# Patient Record
Sex: MALE | Race: WHITE
[De-identification: names, ages, dates, MRNs, and addresses within clinical notes are randomized per-mention and may not be internally consistent; named-entity substitution may affect disease eponyms.]

---

## 2017-01-09 ENCOUNTER — APPOINTMENT (OUTPATIENT)
Dept: PODIATRY | Facility: CLINIC | Age: 57
End: 2017-01-09

## 2017-01-12 ENCOUNTER — APPOINTMENT (OUTPATIENT)
Dept: ENDOCRINOLOGY | Facility: CLINIC | Age: 57
End: 2017-01-12

## 2017-01-12 VITALS — SYSTOLIC BLOOD PRESSURE: 110 MMHG | DIASTOLIC BLOOD PRESSURE: 70 MMHG | HEART RATE: 81 BPM

## 2017-03-13 ENCOUNTER — APPOINTMENT (OUTPATIENT)
Dept: PODIATRY | Facility: CLINIC | Age: 57
End: 2017-03-13

## 2017-03-13 VITALS — HEIGHT: 71 IN | WEIGHT: 285 LBS | BODY MASS INDEX: 39.9 KG/M2

## 2017-05-09 ENCOUNTER — APPOINTMENT (OUTPATIENT)
Dept: PODIATRY | Facility: CLINIC | Age: 57
End: 2017-05-09

## 2017-05-09 VITALS
HEIGHT: 71 IN | SYSTOLIC BLOOD PRESSURE: 140 MMHG | WEIGHT: 285 LBS | DIASTOLIC BLOOD PRESSURE: 72 MMHG | BODY MASS INDEX: 39.9 KG/M2 | HEART RATE: 68 BPM

## 2017-05-09 DIAGNOSIS — L85.3 XEROSIS CUTIS: ICD-10-CM

## 2017-05-23 ENCOUNTER — RESULT REVIEW (OUTPATIENT)
Age: 57
End: 2017-05-23

## 2017-05-23 ENCOUNTER — APPOINTMENT (OUTPATIENT)
Dept: NEPHROLOGY | Facility: CLINIC | Age: 57
End: 2017-05-23

## 2017-05-23 ENCOUNTER — OUTPATIENT (OUTPATIENT)
Dept: OUTPATIENT SERVICES | Facility: HOSPITAL | Age: 57
LOS: 1 days | Discharge: HOME | End: 2017-05-23

## 2017-05-23 VITALS
SYSTOLIC BLOOD PRESSURE: 135 MMHG | WEIGHT: 300 LBS | HEIGHT: 71 IN | BODY MASS INDEX: 42 KG/M2 | HEART RATE: 98 BPM | DIASTOLIC BLOOD PRESSURE: 77 MMHG

## 2017-06-28 DIAGNOSIS — N18.9 CHRONIC KIDNEY DISEASE, UNSPECIFIED: ICD-10-CM

## 2017-07-05 ENCOUNTER — APPOINTMENT (OUTPATIENT)
Dept: PODIATRY | Facility: CLINIC | Age: 57
End: 2017-07-05

## 2017-07-05 ENCOUNTER — OUTPATIENT (OUTPATIENT)
Dept: OUTPATIENT SERVICES | Facility: HOSPITAL | Age: 57
LOS: 1 days | Discharge: HOME | End: 2017-07-05

## 2017-07-05 VITALS — BODY MASS INDEX: 42.09 KG/M2 | WEIGHT: 294 LBS | HEIGHT: 70 IN

## 2017-07-11 LAB
25(OH)D3 SERPL-MCNC: 50 NG/ML
ALBUMIN SERPL-MCNC: 4.2 G/DL
ALBUMIN/GLOB SERPL: 1.68
ALP SERPL-CCNC: 79 IU/L
ALT SERPL-CCNC: 15 IU/L
ANION GAP SERPL CALC-SCNC: 9 MEQ/L
APPEARANCE UR: CLEAR
AST SERPL-CCNC: 15 IU/L
BACTERIA URNS QL MICRO: ABNORMAL
BASOPHILS # BLD: 0.03 TH/MM3
BASOPHILS NFR BLD: 0.3 %
BILIRUB SERPL-MCNC: 1 MG/DL
BILIRUB UR QL STRIP: NEGATIVE
BUN SERPL-MCNC: 20 MG/DL
BUN/CREAT SERPL: 13.1 %
CALCIUM SERPL-MCNC: 9.9 MG/DL
CHLORIDE SERPL-SCNC: 102 MEQ/L
CO2 SERPL-SCNC: 27 MEQ/L
COLOR UR: YELLOW
CREAT SERPL-MCNC: 1.53 MG/DL
CREAT UR-MCNC: 171.5 MG/DL
DIFFERENTIAL METHOD BLD: NORMAL
EOSINOPHIL # BLD: 0.1 TH/MM3
EOSINOPHIL NFR BLD: 1.1 %
ERYTHROCYTE [DISTWIDTH] IN BLOOD BY AUTOMATED COUNT: 13.8 %
ESTIMATED AVERGAGE GLUCOSE (NORTH): 346 MG/DL
GFR SERPL CREATININE-BSD FRML MDRD: 47
GLUCOSE SERPL-MCNC: 275 MG/DL
GLUCOSE UR STRIP-MCNC: >=1000 MG/DL
GRANULOCYTES # BLD: 6.9 TH/MM3
GRANULOCYTES NFR BLD: 78.1 %
HBA1C MFR BLD: 13.7 %
HCT VFR BLD AUTO: 43.6 %
HGB BLD-MCNC: 14.1 G/DL
HGB UR QL STRIP: ABNORMAL
IMM GRANULOCYTES # BLD: 0.02 TH/MM3
IMM GRANULOCYTES NFR BLD: 0.2 %
KETONES UR STRIP-MCNC: NEGATIVE MG/DL
LYMPHOCYTES # BLD: 1.23 TH/MM3
LYMPHOCYTES NFR BLD: 13.9 %
MCH RBC QN AUTO: 28.3 PG
MCHC RBC AUTO-ENTMCNC: 32.3 G/DL
MCV RBC AUTO: 87.4 FL
MONOCYTES # BLD: 0.57 TH/MM3
MONOCYTES NFR BLD: 6.4 %
NITRITE UR QL STRIP: NEGATIVE
PH UR STRIP: 6
PHOSPHATE SERPL-MCNC: 3.7 MG/DL
PLATELET # BLD: 340 TH/MM3
PMV BLD AUTO: 10 FL
POTASSIUM SERPL-SCNC: 4.8 MMOL/L
PROT SERPL-MCNC: 6.7 G/DL
PROT UR STRIP-MCNC: NEGATIVE MG/DL
PROT UR-MCNC: 19 MG/DL
PROT/CREAT UR: 110.77 MG/G CRE
RBC # BLD AUTO: 4.99 MIL/MM3
RBC #/AREA URNS HPF: ABNORMAL P/HPF
SODIUM SERPL-SCNC: 138 MEQ/L
SP GR UR STRIP: 1.02
URINE COMP/EPITH (NORTH): ABNORMAL
UROBILINOGEN UR STRIP-MCNC: 0.2 MG/DL
VITAMIN D2 SERPL-MCNC: <4 NG/ML
VITAMIN D3 SERPL-MCNC: 50 NG/ML
WBC # BLD: 8.85 TH/MM3
WBC URNS QL MICRO: ABNORMAL
WBC URNS QL MICRO: ABNORMAL P/HPF

## 2017-07-13 ENCOUNTER — APPOINTMENT (OUTPATIENT)
Dept: ENDOCRINOLOGY | Facility: CLINIC | Age: 57
End: 2017-07-13

## 2017-07-13 ENCOUNTER — OUTPATIENT (OUTPATIENT)
Dept: OUTPATIENT SERVICES | Facility: HOSPITAL | Age: 57
LOS: 1 days | Discharge: HOME | End: 2017-07-13

## 2017-07-13 VITALS
SYSTOLIC BLOOD PRESSURE: 117 MMHG | BODY MASS INDEX: 42 KG/M2 | WEIGHT: 300 LBS | HEIGHT: 71 IN | HEART RATE: 79 BPM | DIASTOLIC BLOOD PRESSURE: 69 MMHG

## 2017-07-13 RX ORDER — UBIDECARENONE/VIT E ACET 100MG-5
50 MCG CAPSULE ORAL DAILY
Refills: 0 | Status: ACTIVE | COMMUNITY
Start: 2017-07-13

## 2017-07-14 RX ORDER — INSULIN ADMIN. SUPPLIES
30G X 8 MM INSULIN PEN (EA) SUBCUTANEOUS
Qty: 200 | Refills: 3 | Status: COMPLETED | COMMUNITY
Start: 2017-05-25 | End: 2017-07-14

## 2017-07-21 DIAGNOSIS — E78.00 PURE HYPERCHOLESTEROLEMIA, UNSPECIFIED: ICD-10-CM

## 2017-07-21 DIAGNOSIS — E11.65 TYPE 2 DIABETES MELLITUS WITH HYPERGLYCEMIA: ICD-10-CM

## 2017-07-21 DIAGNOSIS — E06.3 AUTOIMMUNE THYROIDITIS: ICD-10-CM

## 2017-09-06 ENCOUNTER — OUTPATIENT (OUTPATIENT)
Dept: OUTPATIENT SERVICES | Facility: HOSPITAL | Age: 57
LOS: 1 days | Discharge: HOME | End: 2017-09-06

## 2017-09-06 ENCOUNTER — APPOINTMENT (OUTPATIENT)
Dept: PODIATRY | Facility: CLINIC | Age: 57
End: 2017-09-06

## 2017-09-06 VITALS
BODY MASS INDEX: 40.46 KG/M2 | TEMPERATURE: 96.3 F | SYSTOLIC BLOOD PRESSURE: 108 MMHG | HEART RATE: 80 BPM | WEIGHT: 289 LBS | DIASTOLIC BLOOD PRESSURE: 74 MMHG | HEIGHT: 71 IN

## 2017-09-06 DIAGNOSIS — B35.1 TINEA UNGUIUM: ICD-10-CM

## 2017-09-22 ENCOUNTER — OUTPATIENT (OUTPATIENT)
Dept: OUTPATIENT SERVICES | Facility: HOSPITAL | Age: 57
LOS: 1 days | Discharge: HOME | End: 2017-09-22

## 2017-09-26 DIAGNOSIS — H43.399 OTHER VITREOUS OPACITIES, UNSPECIFIED EYE: ICD-10-CM

## 2017-09-26 DIAGNOSIS — E11.9 TYPE 2 DIABETES MELLITUS WITHOUT COMPLICATIONS: ICD-10-CM

## 2017-09-26 DIAGNOSIS — I10 ESSENTIAL (PRIMARY) HYPERTENSION: ICD-10-CM

## 2017-09-26 DIAGNOSIS — H25.813 COMBINED FORMS OF AGE-RELATED CATARACT, BILATERAL: ICD-10-CM

## 2017-10-11 ENCOUNTER — OUTPATIENT (OUTPATIENT)
Dept: OUTPATIENT SERVICES | Facility: HOSPITAL | Age: 57
LOS: 1 days | Discharge: HOME | End: 2017-10-11

## 2017-10-11 ENCOUNTER — APPOINTMENT (OUTPATIENT)
Dept: ENDOCRINOLOGY | Facility: CLINIC | Age: 57
End: 2017-10-11

## 2017-10-11 VITALS — DIASTOLIC BLOOD PRESSURE: 67 MMHG | HEART RATE: 80 BPM | SYSTOLIC BLOOD PRESSURE: 94 MMHG

## 2017-10-11 VITALS — HEIGHT: 71 IN | WEIGHT: 285 LBS | BODY MASS INDEX: 39.9 KG/M2

## 2017-10-11 DIAGNOSIS — Z86.39 PERSONAL HISTORY OF OTHER ENDOCRINE, NUTRITIONAL AND METABOLIC DISEASE: ICD-10-CM

## 2017-10-11 DIAGNOSIS — E11.65 TYPE 2 DIABETES MELLITUS WITH HYPERGLYCEMIA: ICD-10-CM

## 2017-10-11 DIAGNOSIS — Z87.448 PERSONAL HISTORY OF OTHER DISEASES OF URINARY SYSTEM: ICD-10-CM

## 2017-10-11 DIAGNOSIS — E78.00 PURE HYPERCHOLESTEROLEMIA, UNSPECIFIED: ICD-10-CM

## 2017-10-11 DIAGNOSIS — E06.3 AUTOIMMUNE THYROIDITIS: ICD-10-CM

## 2017-10-16 DIAGNOSIS — E66.01 MORBID (SEVERE) OBESITY DUE TO EXCESS CALORIES: ICD-10-CM

## 2017-10-16 DIAGNOSIS — E11.22 TYPE 2 DIABETES MELLITUS WITH DIABETIC CHRONIC KIDNEY DISEASE: ICD-10-CM

## 2017-10-16 DIAGNOSIS — E11.65 TYPE 2 DIABETES MELLITUS WITH HYPERGLYCEMIA: ICD-10-CM

## 2017-10-17 ENCOUNTER — OUTPATIENT (OUTPATIENT)
Dept: OUTPATIENT SERVICES | Facility: HOSPITAL | Age: 57
LOS: 1 days | Discharge: HOME | End: 2017-10-17

## 2017-11-07 ENCOUNTER — APPOINTMENT (OUTPATIENT)
Dept: NEPHROLOGY | Facility: CLINIC | Age: 57
End: 2017-11-07

## 2017-11-07 ENCOUNTER — RESULT REVIEW (OUTPATIENT)
Age: 57
End: 2017-11-07

## 2017-11-07 ENCOUNTER — OUTPATIENT (OUTPATIENT)
Dept: OUTPATIENT SERVICES | Facility: HOSPITAL | Age: 57
LOS: 1 days | Discharge: HOME | End: 2017-11-07

## 2017-11-07 VITALS
BODY MASS INDEX: 39.76 KG/M2 | WEIGHT: 284 LBS | SYSTOLIC BLOOD PRESSURE: 102 MMHG | DIASTOLIC BLOOD PRESSURE: 67 MMHG | HEIGHT: 71 IN | HEART RATE: 80 BPM

## 2017-11-07 DIAGNOSIS — E11.65 TYPE 2 DIABETES MELLITUS WITH HYPERGLYCEMIA: ICD-10-CM

## 2017-11-07 RX ORDER — ATORVASTATIN CALCIUM 40 MG/1
40 TABLET, FILM COATED ORAL DAILY
Qty: 30 | Refills: 5 | Status: DISCONTINUED | COMMUNITY
Start: 2017-07-13 | End: 2017-11-07

## 2017-11-08 ENCOUNTER — OUTPATIENT (OUTPATIENT)
Dept: OUTPATIENT SERVICES | Facility: HOSPITAL | Age: 57
LOS: 1 days | Discharge: HOME | End: 2017-11-08

## 2017-11-08 ENCOUNTER — APPOINTMENT (OUTPATIENT)
Dept: PODIATRY | Facility: CLINIC | Age: 57
End: 2017-11-08

## 2017-11-08 VITALS
WEIGHT: 284 LBS | DIASTOLIC BLOOD PRESSURE: 70 MMHG | SYSTOLIC BLOOD PRESSURE: 105 MMHG | HEIGHT: 71 IN | HEART RATE: 85 BPM | BODY MASS INDEX: 39.76 KG/M2

## 2018-01-03 ENCOUNTER — OUTPATIENT (OUTPATIENT)
Dept: OUTPATIENT SERVICES | Facility: HOSPITAL | Age: 58
LOS: 1 days | Discharge: HOME | End: 2018-01-03

## 2018-01-03 ENCOUNTER — APPOINTMENT (OUTPATIENT)
Dept: PODIATRY | Facility: CLINIC | Age: 58
End: 2018-01-03

## 2018-01-03 VITALS
HEART RATE: 163 BPM | WEIGHT: 280 LBS | SYSTOLIC BLOOD PRESSURE: 92 MMHG | HEIGHT: 70 IN | DIASTOLIC BLOOD PRESSURE: 66 MMHG | BODY MASS INDEX: 40.09 KG/M2

## 2018-01-03 RX ORDER — NAFTIFINE HYDROCHLORIDE 10 MG/G
1 GEL TOPICAL TWICE DAILY
Qty: 60 | Refills: 1 | Status: ACTIVE | COMMUNITY
Start: 2018-01-03 | End: 1900-01-01

## 2018-02-13 LAB
ANION GAP SERPL CALC-SCNC: 11 MEQ/L
APPEARANCE UR: CLEAR
BACTERIA URNS QL MICRO: ABNORMAL
BASOPHILS # BLD: 0.03 TH/MM3
BASOPHILS NFR BLD: 0.4 %
BILIRUB UR QL STRIP: NEGATIVE
BUN SERPL-MCNC: 35 MG/DL
BUN/CREAT SERPL: 19.8 %
CALCIUM SERPL-MCNC: 10.1 MG/DL
CHLORIDE SERPL-SCNC: 100 MEQ/L
CO2 SERPL-SCNC: 25 MEQ/L
COLOR UR: YELLOW
CREAT SERPL-MCNC: 1.77 MG/DL
DIFFERENTIAL METHOD BLD: NORMAL
EOSINOPHIL # BLD: 0.12 TH/MM3
EOSINOPHIL NFR BLD: 1.6 %
ERYTHROCYTE [DISTWIDTH] IN BLOOD BY AUTOMATED COUNT: 14.4 %
GFR SERPL CREATININE-BSD FRML MDRD: 40
GLUCOSE SERPL-MCNC: 294 MG/DL
GLUCOSE UR STRIP-MCNC: >=1000 MG/DL
GRANULOCYTES # BLD: 5.28 TH/MM3
GRANULOCYTES NFR BLD: 71.2 %
HCT VFR BLD AUTO: 45 %
HGB BLD-MCNC: 14.9 G/DL
HGB UR QL STRIP: NEGATIVE
IMM GRANULOCYTES # BLD: 0.01 TH/MM3
IMM GRANULOCYTES NFR BLD: 0.1 %
KETONES UR STRIP-MCNC: NEGATIVE MG/DL
LYMPHOCYTES # BLD: 1.32 TH/MM3
LYMPHOCYTES NFR BLD: 17.8 %
MCH RBC QN AUTO: 28.1 PG
MCHC RBC AUTO-ENTMCNC: 33.1 G/DL
MCV RBC AUTO: 84.9 FL
MONOCYTES # BLD: 0.66 TH/MM3
MONOCYTES NFR BLD: 8.9 %
NITRITE UR QL STRIP: NEGATIVE
PH UR STRIP: 6
PLATELET # BLD: 360 TH/MM3
PMV BLD AUTO: 9.7 FL
POTASSIUM SERPL-SCNC: 4.8 MMOL/L
PROT UR STRIP-MCNC: NEGATIVE MG/DL
RBC # BLD AUTO: 5.3 MIL/MM3
SODIUM SERPL-SCNC: 136 MEQ/L
SP GR UR STRIP: 1.02
UROBILINOGEN UR STRIP-MCNC: 0.2 MG/DL
WBC # BLD: 7.42 TH/MM3
WBC URNS QL MICRO: ABNORMAL
WBC URNS QL MICRO: ABNORMAL P/HPF

## 2018-03-07 ENCOUNTER — LABORATORY RESULT (OUTPATIENT)
Age: 58
End: 2018-03-07

## 2018-03-07 ENCOUNTER — OUTPATIENT (OUTPATIENT)
Dept: OUTPATIENT SERVICES | Facility: HOSPITAL | Age: 58
LOS: 1 days | Discharge: HOME | End: 2018-03-07

## 2018-03-07 ENCOUNTER — APPOINTMENT (OUTPATIENT)
Dept: PODIATRY | Facility: CLINIC | Age: 58
End: 2018-03-07
Payer: MEDICAID

## 2018-03-07 VITALS
HEIGHT: 70 IN | RESPIRATION RATE: 20 BRPM | DIASTOLIC BLOOD PRESSURE: 60 MMHG | SYSTOLIC BLOOD PRESSURE: 94 MMHG | WEIGHT: 28 LBS | BODY MASS INDEX: 4.01 KG/M2 | HEART RATE: 94 BPM

## 2018-03-07 DIAGNOSIS — E78.5 HYPERLIPIDEMIA, UNSPECIFIED: ICD-10-CM

## 2018-03-07 DIAGNOSIS — E11.65 TYPE 2 DIABETES MELLITUS WITH HYPERGLYCEMIA: ICD-10-CM

## 2018-03-07 PROCEDURE — 11721 DEBRIDE NAIL 6 OR MORE: CPT

## 2018-04-12 ENCOUNTER — APPOINTMENT (OUTPATIENT)
Dept: ENDOCRINOLOGY | Facility: CLINIC | Age: 58
End: 2018-04-12

## 2018-04-12 ENCOUNTER — OUTPATIENT (OUTPATIENT)
Dept: OUTPATIENT SERVICES | Facility: HOSPITAL | Age: 58
LOS: 1 days | Discharge: HOME | End: 2018-04-12

## 2018-04-12 VITALS
SYSTOLIC BLOOD PRESSURE: 102 MMHG | BODY MASS INDEX: 41.52 KG/M2 | WEIGHT: 290 LBS | HEIGHT: 70 IN | DIASTOLIC BLOOD PRESSURE: 71 MMHG | HEART RATE: 97 BPM

## 2018-04-17 ENCOUNTER — OUTPATIENT (OUTPATIENT)
Dept: OUTPATIENT SERVICES | Facility: HOSPITAL | Age: 58
LOS: 1 days | Discharge: HOME | End: 2018-04-17

## 2018-04-18 ENCOUNTER — LABORATORY RESULT (OUTPATIENT)
Age: 58
End: 2018-04-18

## 2018-04-18 DIAGNOSIS — E11.9 TYPE 2 DIABETES MELLITUS WITHOUT COMPLICATIONS: ICD-10-CM

## 2018-04-18 DIAGNOSIS — H43.393 OTHER VITREOUS OPACITIES, BILATERAL: ICD-10-CM

## 2018-04-18 DIAGNOSIS — H26.9 UNSPECIFIED CATARACT: ICD-10-CM

## 2018-04-18 LAB
ANION GAP SERPL CALC-SCNC: 18 MMOL/L
BUN SERPL-MCNC: 30 MG/DL
CALCIUM SERPL-MCNC: 9.9 MG/DL
CHLORIDE SERPL-SCNC: 99 MMOL/L
CHOLEST SERPL-MCNC: 240 MG/DL
CHOLEST/HDLC SERPL: 6.7 RATIO
CO2 SERPL-SCNC: 26 MMOL/L
CREAT SERPL-MCNC: 1.8 MG/DL
GLUCOSE SERPL-MCNC: 160 MG/DL
HDLC SERPL-MCNC: 36 MG/DL
LDLC SERPL CALC-MCNC: 137 MG/DL
POTASSIUM SERPL-SCNC: 4.7 MMOL/L
SODIUM SERPL-SCNC: 143 MMOL/L
TRIGL SERPL-MCNC: 360 MG/DL

## 2018-04-18 RX ORDER — SIMVASTATIN 20 MG/1
20 TABLET, FILM COATED ORAL DAILY
Qty: 30 | Refills: 0 | Status: DISCONTINUED | COMMUNITY
Start: 2017-11-07 | End: 2018-04-18

## 2018-04-19 LAB — PSA SERPL-MCNC: 0.61 NG/ML

## 2018-05-03 ENCOUNTER — RX RENEWAL (OUTPATIENT)
Age: 58
End: 2018-05-03

## 2018-05-08 ENCOUNTER — OUTPATIENT (OUTPATIENT)
Dept: OUTPATIENT SERVICES | Facility: HOSPITAL | Age: 58
LOS: 1 days | Discharge: HOME | End: 2018-05-08

## 2018-05-08 ENCOUNTER — APPOINTMENT (OUTPATIENT)
Dept: NEPHROLOGY | Facility: CLINIC | Age: 58
End: 2018-05-08

## 2018-05-08 VITALS
HEART RATE: 92 BPM | DIASTOLIC BLOOD PRESSURE: 42 MMHG | WEIGHT: 288 LBS | SYSTOLIC BLOOD PRESSURE: 90 MMHG | BODY MASS INDEX: 41.23 KG/M2 | HEIGHT: 70 IN

## 2018-05-14 DIAGNOSIS — E11.8 TYPE 2 DIABETES MELLITUS WITH UNSPECIFIED COMPLICATIONS: ICD-10-CM

## 2018-05-14 DIAGNOSIS — E11.40 TYPE 2 DIABETES MELLITUS WITH DIABETIC NEUROPATHY, UNSPECIFIED: ICD-10-CM

## 2018-05-14 DIAGNOSIS — E11.22 TYPE 2 DIABETES MELLITUS WITH DIABETIC CHRONIC KIDNEY DISEASE: ICD-10-CM

## 2018-05-30 ENCOUNTER — OUTPATIENT (OUTPATIENT)
Dept: OUTPATIENT SERVICES | Facility: HOSPITAL | Age: 58
LOS: 1 days | Discharge: HOME | End: 2018-05-30

## 2018-06-01 DIAGNOSIS — H25.813 COMBINED FORMS OF AGE-RELATED CATARACT, BILATERAL: ICD-10-CM

## 2018-06-01 DIAGNOSIS — H35.033 HYPERTENSIVE RETINOPATHY, BILATERAL: ICD-10-CM

## 2018-06-01 DIAGNOSIS — E11.9 TYPE 2 DIABETES MELLITUS WITHOUT COMPLICATIONS: ICD-10-CM

## 2018-06-12 ENCOUNTER — OUTPATIENT (OUTPATIENT)
Dept: OUTPATIENT SERVICES | Facility: HOSPITAL | Age: 58
LOS: 1 days | Discharge: HOME | End: 2018-06-12

## 2018-06-12 ENCOUNTER — APPOINTMENT (OUTPATIENT)
Dept: PODIATRY | Facility: CLINIC | Age: 58
End: 2018-06-12
Payer: MEDICAID

## 2018-06-12 VITALS
HEIGHT: 70 IN | WEIGHT: 290 LBS | DIASTOLIC BLOOD PRESSURE: 65 MMHG | BODY MASS INDEX: 41.52 KG/M2 | SYSTOLIC BLOOD PRESSURE: 95 MMHG | HEART RATE: 78 BPM

## 2018-06-12 DIAGNOSIS — E11.42 TYPE 2 DIABETES MELLITUS WITH DIABETIC POLYNEUROPATHY: ICD-10-CM

## 2018-06-12 DIAGNOSIS — M79.672 PAIN IN LEFT FOOT: ICD-10-CM

## 2018-06-12 DIAGNOSIS — M79.671 PAIN IN RIGHT FOOT: ICD-10-CM

## 2018-06-12 DIAGNOSIS — B35.1 TINEA UNGUIUM: ICD-10-CM

## 2018-06-12 PROCEDURE — 11721 DEBRIDE NAIL 6 OR MORE: CPT

## 2018-06-12 PROCEDURE — 99212 OFFICE O/P EST SF 10 MIN: CPT | Mod: 25

## 2018-09-11 ENCOUNTER — OUTPATIENT (OUTPATIENT)
Dept: OUTPATIENT SERVICES | Facility: HOSPITAL | Age: 58
LOS: 1 days | Discharge: HOME | End: 2018-09-11

## 2018-09-11 ENCOUNTER — APPOINTMENT (OUTPATIENT)
Dept: PODIATRY | Facility: CLINIC | Age: 58
End: 2018-09-11
Payer: MEDICAID

## 2018-09-11 VITALS
WEIGHT: 291 LBS | DIASTOLIC BLOOD PRESSURE: 69 MMHG | SYSTOLIC BLOOD PRESSURE: 97 MMHG | HEIGHT: 70 IN | HEART RATE: 89 BPM | BODY MASS INDEX: 41.66 KG/M2

## 2018-09-11 PROCEDURE — 11721 DEBRIDE NAIL 6 OR MORE: CPT

## 2018-09-11 PROCEDURE — 99212 OFFICE O/P EST SF 10 MIN: CPT | Mod: 25

## 2018-09-11 RX ORDER — CLOTRIMAZOLE 10 MG/G
1 CREAM TOPICAL
Qty: 1 | Refills: 5 | Status: ACTIVE | COMMUNITY
Start: 2018-09-11 | End: 1900-01-01

## 2018-09-24 DIAGNOSIS — M79.672 PAIN IN LEFT FOOT: ICD-10-CM

## 2018-09-24 DIAGNOSIS — M79.671 PAIN IN RIGHT FOOT: ICD-10-CM

## 2018-09-24 DIAGNOSIS — B35.1 TINEA UNGUIUM: ICD-10-CM

## 2018-10-11 ENCOUNTER — APPOINTMENT (OUTPATIENT)
Dept: ENDOCRINOLOGY | Facility: CLINIC | Age: 58
End: 2018-10-11

## 2018-11-01 ENCOUNTER — RX RENEWAL (OUTPATIENT)
Age: 58
End: 2018-11-01

## 2018-11-01 RX ORDER — LIRAGLUTIDE 6 MG/ML
18 INJECTION SUBCUTANEOUS DAILY
Qty: 1 | Refills: 5 | Status: DISCONTINUED | COMMUNITY
Start: 2017-07-13 | End: 2018-11-01

## 2018-11-01 RX ORDER — INSULIN ADMIN. SUPPLIES
30G X 8 MM INSULIN PEN (EA) SUBCUTANEOUS
Qty: 1 | Refills: 5 | Status: DISCONTINUED | COMMUNITY
Start: 2017-07-13 | End: 2018-11-01

## 2018-11-19 ENCOUNTER — APPOINTMENT (OUTPATIENT)
Dept: PODIATRY | Facility: CLINIC | Age: 58
End: 2018-11-19
Payer: MEDICAID

## 2018-11-19 ENCOUNTER — OUTPATIENT (OUTPATIENT)
Dept: OUTPATIENT SERVICES | Facility: HOSPITAL | Age: 58
LOS: 1 days | Discharge: HOME | End: 2018-11-19

## 2018-11-19 VITALS
DIASTOLIC BLOOD PRESSURE: 70 MMHG | SYSTOLIC BLOOD PRESSURE: 103 MMHG | HEART RATE: 99 BPM | HEIGHT: 70 IN | BODY MASS INDEX: 40.23 KG/M2 | WEIGHT: 281 LBS

## 2018-11-19 PROCEDURE — 11721 DEBRIDE NAIL 6 OR MORE: CPT

## 2018-11-21 DIAGNOSIS — M79.671 PAIN IN RIGHT FOOT: ICD-10-CM

## 2018-11-21 DIAGNOSIS — M79.672 PAIN IN LEFT FOOT: ICD-10-CM

## 2018-11-21 DIAGNOSIS — B35.1 TINEA UNGUIUM: ICD-10-CM

## 2018-12-05 ENCOUNTER — OUTPATIENT (OUTPATIENT)
Dept: OUTPATIENT SERVICES | Facility: HOSPITAL | Age: 58
LOS: 1 days | Discharge: HOME | End: 2018-12-05

## 2018-12-05 ENCOUNTER — APPOINTMENT (OUTPATIENT)
Dept: ENDOCRINOLOGY | Facility: CLINIC | Age: 58
End: 2018-12-05

## 2018-12-05 VITALS
SYSTOLIC BLOOD PRESSURE: 97 MMHG | WEIGHT: 284 LBS | HEIGHT: 70 IN | BODY MASS INDEX: 40.66 KG/M2 | HEART RATE: 98 BPM | DIASTOLIC BLOOD PRESSURE: 66 MMHG

## 2018-12-05 RX ORDER — SEMAGLUTIDE 1.34 MG/ML
2 INJECTION, SOLUTION SUBCUTANEOUS
Qty: 1 | Refills: 0 | Status: DISCONTINUED | COMMUNITY
Start: 2018-11-01 | End: 2018-12-05

## 2018-12-06 LAB
ALBUMIN SERPL ELPH-MCNC: 4.1 G/DL
ALP BLD-CCNC: 67 U/L
ALT SERPL-CCNC: 12 U/L
ANION GAP SERPL CALC-SCNC: 14 MMOL/L
AST SERPL-CCNC: 14 U/L
BASOPHILS # BLD AUTO: 0.05 K/UL
BASOPHILS NFR BLD AUTO: 0.6 %
BILIRUB SERPL-MCNC: 0.5 MG/DL
BUN SERPL-MCNC: 29 MG/DL
CALCIUM SERPL-MCNC: 10.2 MG/DL
CHLORIDE SERPL-SCNC: 103 MMOL/L
CHOLEST SERPL-MCNC: 112 MG/DL
CHOLEST/HDLC SERPL: 3.7 RATIO
CO2 SERPL-SCNC: 25 MMOL/L
CREAT SERPL-MCNC: 1.6 MG/DL
EOSINOPHIL # BLD AUTO: 0.09 K/UL
EOSINOPHIL NFR BLD AUTO: 1.1 %
ESTIMATED AVERAGE GLUCOSE: 197 MG/DL
GLUCOSE SERPL-MCNC: 275 MG/DL
HBA1C MFR BLD HPLC: 8.5 %
HCT VFR BLD CALC: 44.5 %
HDLC SERPL-MCNC: 30 MG/DL
HGB BLD-MCNC: 14.4 G/DL
IMM GRANULOCYTES NFR BLD AUTO: 0.4 %
LDLC SERPL CALC-MCNC: 56 MG/DL
LYMPHOCYTES # BLD AUTO: 1.3 K/UL
LYMPHOCYTES NFR BLD AUTO: 16.1 %
MAN DIFF?: NORMAL
MCHC RBC-ENTMCNC: 28.2 PG
MCHC RBC-ENTMCNC: 32.4 G/DL
MCV RBC AUTO: 87.1 FL
MONOCYTES # BLD AUTO: 0.55 K/UL
MONOCYTES NFR BLD AUTO: 6.8 %
NEUTROPHILS # BLD AUTO: 6.05 K/UL
NEUTROPHILS NFR BLD AUTO: 75 %
PLATELET # BLD AUTO: 323 K/UL
POTASSIUM SERPL-SCNC: 5.2 MMOL/L
PROT SERPL-MCNC: 6.5 G/DL
PSA SERPL-MCNC: 0.49 NG/ML
RBC # BLD: 5.11 M/UL
RBC # FLD: 13.2 %
SODIUM SERPL-SCNC: 142 MMOL/L
TRIGL SERPL-MCNC: 221 MG/DL
TSH SERPL-ACNC: 1.16 UIU/ML
WBC # FLD AUTO: 8.07 K/UL

## 2018-12-12 DIAGNOSIS — E66.01 MORBID (SEVERE) OBESITY DUE TO EXCESS CALORIES: ICD-10-CM

## 2018-12-12 DIAGNOSIS — E11.65 TYPE 2 DIABETES MELLITUS WITH HYPERGLYCEMIA: ICD-10-CM

## 2018-12-12 DIAGNOSIS — E78.00 PURE HYPERCHOLESTEROLEMIA, UNSPECIFIED: ICD-10-CM

## 2018-12-12 DIAGNOSIS — E11.21 TYPE 2 DIABETES MELLITUS WITH DIABETIC NEPHROPATHY: ICD-10-CM

## 2019-02-11 ENCOUNTER — APPOINTMENT (OUTPATIENT)
Dept: PODIATRY | Facility: CLINIC | Age: 59
End: 2019-02-11
Payer: MEDICAID

## 2019-02-11 ENCOUNTER — OUTPATIENT (OUTPATIENT)
Dept: OUTPATIENT SERVICES | Facility: HOSPITAL | Age: 59
LOS: 1 days | Discharge: HOME | End: 2019-02-11

## 2019-02-11 VITALS
HEART RATE: 90 BPM | BODY MASS INDEX: 40.09 KG/M2 | WEIGHT: 280 LBS | HEIGHT: 70 IN | SYSTOLIC BLOOD PRESSURE: 91 MMHG | DIASTOLIC BLOOD PRESSURE: 65 MMHG

## 2019-02-11 PROCEDURE — 11721 DEBRIDE NAIL 6 OR MORE: CPT

## 2019-02-11 NOTE — HISTORY OF PRESENT ILLNESS
[FreeTextEntry1] : 57 y/o male is here today  for  treatment of painful mycotic nails digits 1-5 b/l. Patient  continue with periodic nail debridement.

## 2019-02-11 NOTE — PHYSICAL EXAM
[General Appearance - Alert] : alert [General Appearance - In No Acute Distress] : in no acute distress [Musculoskeletal - Swelling] : no joint swelling seen [Motor Tone] : muscle strength and tone were normal [Skin Turgor] : normal skin turgor [] : no rash [Skin Lesions] : no skin lesions [#1 Diminished] : number 1 was diminished [#2 Diminished] : number 2 was diminished [#4 Diminished] : number 4 was diminished [#3 Diminished] : number 3 was normal [#5 Diminished] : number 5 was normal [#6 Diminished] : number 6 was normal [#7 Diminished] : number 7 was normal [#8 Diminished] : number 8 was normal [#9 Diminished] : number 9 was normal [#10 Diminished] : number 10 was normal [FreeTextEntry1] : thick mycotic nails x 10

## 2019-02-11 NOTE — PROCEDURE
[FreeTextEntry1] : -Aseptic debridement of all fungal nails.  Patient has pain about the toes secondary to nail pressure and relates improvement with periodic debridement.\par -Return 2-3 months\par

## 2019-02-25 DIAGNOSIS — M79.671 PAIN IN RIGHT FOOT: ICD-10-CM

## 2019-02-25 DIAGNOSIS — B35.1 TINEA UNGUIUM: ICD-10-CM

## 2019-02-25 DIAGNOSIS — M79.672 PAIN IN LEFT FOOT: ICD-10-CM

## 2019-03-01 ENCOUNTER — RX RENEWAL (OUTPATIENT)
Age: 59
End: 2019-03-01

## 2019-04-01 ENCOUNTER — OUTPATIENT (OUTPATIENT)
Dept: OUTPATIENT SERVICES | Facility: HOSPITAL | Age: 59
LOS: 1 days | End: 2019-04-01
Payer: MEDICAID

## 2019-04-01 PROCEDURE — G9001: CPT

## 2019-04-15 ENCOUNTER — OUTPATIENT (OUTPATIENT)
Dept: OUTPATIENT SERVICES | Facility: HOSPITAL | Age: 59
LOS: 1 days | Discharge: HOME | End: 2019-04-15

## 2019-04-15 ENCOUNTER — APPOINTMENT (OUTPATIENT)
Dept: PODIATRY | Facility: CLINIC | Age: 59
End: 2019-04-15
Payer: MEDICAID

## 2019-04-15 VITALS
DIASTOLIC BLOOD PRESSURE: 66 MMHG | HEIGHT: 70 IN | WEIGHT: 280 LBS | BODY MASS INDEX: 40.09 KG/M2 | SYSTOLIC BLOOD PRESSURE: 103 MMHG | HEART RATE: 86 BPM

## 2019-04-15 DIAGNOSIS — M79.671 PAIN IN RIGHT FOOT: ICD-10-CM

## 2019-04-15 DIAGNOSIS — G89.29 PAIN IN LEFT FOOT: ICD-10-CM

## 2019-04-15 DIAGNOSIS — G89.29 PAIN IN RIGHT FOOT: ICD-10-CM

## 2019-04-15 DIAGNOSIS — M79.672 PAIN IN LEFT FOOT: ICD-10-CM

## 2019-04-15 PROCEDURE — 11721 DEBRIDE NAIL 6 OR MORE: CPT

## 2019-04-15 NOTE — PHYSICAL EXAM
[General Appearance - Alert] : alert [General Appearance - In No Acute Distress] : in no acute distress [Musculoskeletal - Swelling] : no joint swelling seen [Motor Tone] : muscle strength and tone were normal [Skin Turgor] : normal skin turgor [] : no rash [Skin Lesions] : no skin lesions [#1 Diminished] : number 1 was diminished [#2 Diminished] : number 2 was diminished [#4 Diminished] : number 4 was diminished [#3 Diminished] : number 3 was normal [#5 Diminished] : number 5 was normal [#6 Diminished] : number 6 was normal [#7 Diminished] : number 7 was normal [#8 Diminished] : number 8 was normal [#10 Diminished] : number 10 was normal [#9 Diminished] : number 9 was normal [FreeTextEntry1] : thick mycotic nails x 10, xerosis b/l heel

## 2019-04-15 NOTE — PROCEDURE
[FreeTextEntry1] : -Aseptic debridement of all fungal nails.  Patient has pain about the toes secondary to nail pressure and relates improvement with periodic debridement.\par -cont clotrimazole cream affected areas.\par -Return 2-3 months\par

## 2019-04-15 NOTE — HISTORY OF PRESENT ILLNESS
[FreeTextEntry1] : 57 y/o male is here today  for  treatment of painful mycotic nails digits 1-5 b/l. Patient  continue with periodic nail debridement. pt blood sugar this morning was 120mg/dl. pt complaints tingling and currently taking gabapentin. pt denies any pain today.

## 2019-04-16 ENCOUNTER — OUTPATIENT (OUTPATIENT)
Dept: OUTPATIENT SERVICES | Facility: HOSPITAL | Age: 59
LOS: 1 days | Discharge: HOME | End: 2019-04-16

## 2019-04-16 DIAGNOSIS — B35.1 TINEA UNGUIUM: ICD-10-CM

## 2019-04-16 DIAGNOSIS — M79.672 PAIN IN LEFT FOOT: ICD-10-CM

## 2019-04-16 DIAGNOSIS — M79.671 PAIN IN RIGHT FOOT: ICD-10-CM

## 2019-04-17 DIAGNOSIS — H35.033 HYPERTENSIVE RETINOPATHY, BILATERAL: ICD-10-CM

## 2019-04-17 DIAGNOSIS — H25.813 COMBINED FORMS OF AGE-RELATED CATARACT, BILATERAL: ICD-10-CM

## 2019-04-17 DIAGNOSIS — Z71.89 OTHER SPECIFIED COUNSELING: ICD-10-CM

## 2019-04-17 DIAGNOSIS — H43.393 OTHER VITREOUS OPACITIES, BILATERAL: ICD-10-CM

## 2019-05-08 ENCOUNTER — RX RENEWAL (OUTPATIENT)
Age: 59
End: 2019-05-08

## 2019-05-14 ENCOUNTER — OUTPATIENT (OUTPATIENT)
Dept: OUTPATIENT SERVICES | Facility: HOSPITAL | Age: 59
LOS: 1 days | Discharge: HOME | End: 2019-05-14

## 2019-05-14 ENCOUNTER — LABORATORY RESULT (OUTPATIENT)
Age: 59
End: 2019-05-14

## 2019-05-14 ENCOUNTER — APPOINTMENT (OUTPATIENT)
Dept: NEPHROLOGY | Facility: CLINIC | Age: 59
End: 2019-05-14

## 2019-05-14 VITALS
HEART RATE: 89 BPM | TEMPERATURE: 96.4 F | WEIGHT: 293 LBS | SYSTOLIC BLOOD PRESSURE: 110 MMHG | HEIGHT: 70 IN | BODY MASS INDEX: 41.95 KG/M2 | DIASTOLIC BLOOD PRESSURE: 73 MMHG

## 2019-05-14 NOTE — HISTORY OF PRESENT ILLNESS
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 Pt. has no complaints, BUN/Cr stabel as of December 2018 29/1.6 as compared to April 30/1.8. \par

## 2019-05-14 NOTE — PHYSICAL EXAM
[General Appearance - In No Acute Distress] : in no acute distress [General Appearance - Alert] : alert [] : no respiratory distress [Respiration, Rhythm And Depth] : normal respiratory rhythm and effort [Heart Rate And Rhythm] : heart rate was normal and rhythm regular [Apical Impulse] : the apical impulse was normal [Abdomen Tenderness] : non-tender [Heart Sounds] : normal S1 and S2 [Bowel Sounds] : normal bowel sounds

## 2019-05-14 NOTE — ASSESSMENT
[FreeTextEntry1] : 59 yo M presents for follow up visit.\par \par CKD III, stable\par -DM is being controlled, last Hgb A1C% 8.5 down from 12.0\par -Continue lisinopril\par -Will repeat BMP, microscopic urinalysis

## 2019-05-21 LAB
ANION GAP SERPL CALC-SCNC: 15 MMOL/L
BUN SERPL-MCNC: 28 MG/DL
CALCIUM SERPL-MCNC: 9.5 MG/DL
CHLORIDE SERPL-SCNC: 98 MMOL/L
CO2 SERPL-SCNC: 26 MMOL/L
CREAT SERPL-MCNC: 1.7 MG/DL
GLUCOSE SERPL-MCNC: 235 MG/DL
POTASSIUM SERPL-SCNC: 4.8 MMOL/L
SODIUM SERPL-SCNC: 139 MMOL/L

## 2019-06-06 ENCOUNTER — OUTPATIENT (OUTPATIENT)
Dept: OUTPATIENT SERVICES | Facility: HOSPITAL | Age: 59
LOS: 1 days | Discharge: HOME | End: 2019-06-06

## 2019-06-06 ENCOUNTER — APPOINTMENT (OUTPATIENT)
Dept: ENDOCRINOLOGY | Facility: CLINIC | Age: 59
End: 2019-06-06

## 2019-06-06 VITALS
HEIGHT: 70 IN | DIASTOLIC BLOOD PRESSURE: 64 MMHG | BODY MASS INDEX: 41.52 KG/M2 | SYSTOLIC BLOOD PRESSURE: 95 MMHG | HEART RATE: 94 BPM | WEIGHT: 290 LBS

## 2019-06-06 NOTE — HISTORY OF PRESENT ILLNESS
[FreeTextEntry1] : DM for 11 years\par FS at home fasting 110-120\par FS post prandial not measured\par +neuropathy  on gabapentin\par + nephropathy CKD 1.7 \par no vision problem \par no hypoglycemia\par \par he walks 45 mn *2 per day\par not complaint with diet (he does not cook, he orders food)\par \par tresiba 60 at bedtime\par ozempic q week\par jardiance 10 \par pioglitazone 30 mg\par

## 2019-06-06 NOTE — ASSESSMENT
[Diabetes Foot Care] : diabetes foot care [Carbohydrate Consistent Diet] : carbohydrate consistent diet [Hypoglycemia Management] : hypoglycemia management [Long Term Vascular Complications] : long term vascular complications of diabetes [Importance of Diet and Exercise] : importance of diet and exercise to improve glycemic control, achieve weight loss and improve cardiovascular health [FreeTextEntry1] : DM type II\par check hba1c, lipid profile, tsh, LFTs\par keep same meds for now\par f up in 6 months\par \par HTN, now on the low side\par decrease lisinopril to 10 and if still dizzy discontinue\par \par

## 2019-06-06 NOTE — PHYSICAL EXAM
[Alert] : alert [No Acute Distress] : no acute distress [Supple] : the neck was supple [PERRL] : pupils equal, round and reactive to light [No Accessory Muscle Use] : no accessory muscle use [Clear to Auscultation] : lungs were clear to auscultation bilaterally [Normal S1, S2] : normal S1 and S2 [No CVA Tenderness] : no ~M costovertebral angle tenderness [No Stigmata of Cushings Syndrome] : no stigmata of cushings syndrome [Normal Gait] : normal gait

## 2019-06-07 LAB
ALBUMIN SERPL ELPH-MCNC: 4.5 G/DL
ALP BLD-CCNC: 72 U/L
ALT SERPL-CCNC: 12 U/L
AST SERPL-CCNC: 13 U/L
BILIRUB DIRECT SERPL-MCNC: <0.2 MG/DL
BILIRUB INDIRECT SERPL-MCNC: >0.5 MG/DL
BILIRUB SERPL-MCNC: 0.7 MG/DL
CHOLEST SERPL-MCNC: 116 MG/DL
CHOLEST/HDLC SERPL: 3.9 RATIO
CREAT SPEC-SCNC: 118 MG/DL
CREAT/PROT UR: 0 RATIO
ESTIMATED AVERAGE GLUCOSE: 194 MG/DL
HBA1C MFR BLD HPLC: 8.4 %
HDLC SERPL-MCNC: 30 MG/DL
LDLC SERPL CALC-MCNC: 62 MG/DL
PROT SERPL-MCNC: 6.8 G/DL
PROT UR-MCNC: 9 MG/DLG/24H
TRIGL SERPL-MCNC: 156 MG/DL
TSH SERPL-ACNC: 1.57 UIU/ML

## 2019-06-12 DIAGNOSIS — E11.22 TYPE 2 DIABETES MELLITUS WITH DIABETIC CHRONIC KIDNEY DISEASE: ICD-10-CM

## 2019-06-12 DIAGNOSIS — E11.65 TYPE 2 DIABETES MELLITUS WITH HYPERGLYCEMIA: ICD-10-CM

## 2019-06-12 DIAGNOSIS — E66.01 MORBID (SEVERE) OBESITY DUE TO EXCESS CALORIES: ICD-10-CM

## 2019-06-18 ENCOUNTER — OTHER (OUTPATIENT)
Age: 59
End: 2019-06-18

## 2019-06-24 ENCOUNTER — APPOINTMENT (OUTPATIENT)
Dept: PODIATRY | Facility: CLINIC | Age: 59
End: 2019-06-24
Payer: MEDICAID

## 2019-06-24 ENCOUNTER — OUTPATIENT (OUTPATIENT)
Dept: OUTPATIENT SERVICES | Facility: HOSPITAL | Age: 59
LOS: 1 days | Discharge: HOME | End: 2019-06-24

## 2019-06-24 VITALS
WEIGHT: 290 LBS | BODY MASS INDEX: 41.52 KG/M2 | HEART RATE: 81 BPM | DIASTOLIC BLOOD PRESSURE: 61 MMHG | SYSTOLIC BLOOD PRESSURE: 108 MMHG | HEIGHT: 70 IN

## 2019-06-24 PROCEDURE — 99213 OFFICE O/P EST LOW 20 MIN: CPT

## 2019-06-24 NOTE — PHYSICAL EXAM
[General Appearance - Alert] : alert [General Appearance - In No Acute Distress] : in no acute distress [] : no rash [Skin Turgor] : normal skin turgor [Skin Lesions] : no skin lesions [#1 Diminished] : number 1 was diminished [#2 Diminished] : number 2 was diminished [Oriented To Time, Place, And Person] : oriented to person, place, and time [Vibration Dec.] : normal vibratory sensation at the level of the toes [#3 Diminished] : number 3 was normal [#5 Diminished] : number 5 was normal [#7 Diminished] : number 7 was normal [#8 Diminished] : number 8 was normal [#6 Diminished] : number 6 was normal [#9 Diminished] : number 9 was normal [#10 Diminished] : number 10 was normal [FreeTextEntry1] : thick mycotic nails x 10  (right foot >left)

## 2019-06-24 NOTE — HISTORY OF PRESENT ILLNESS
[FreeTextEntry1] : 58 year old M  presents for a diabetic foot evaluation. Mr. HARDIN denies any tingling burning in his feet. Last A1c was 8.4. \par

## 2019-06-24 NOTE — ASSESSMENT
[FreeTextEntry1] : -Diabetic neurovascular foot assessment  performed. \par -Discussed with patient diabetic foot hygiene.Patient instructed to regularly check the bottom of the feet\par -Patient given a diabetic foot education sheet\par -Aseptic debridement of mycotic nails x 10\par -Return 6 month\par

## 2019-06-24 NOTE — REVIEW OF SYSTEMS
[Change In Color Of Skin] : change in skin color [Toenail Deformity] : toenail deformity [Toenail Thickening] : toenail thickening [Negative] : Musculoskeletal

## 2019-06-25 ENCOUNTER — OUTPATIENT (OUTPATIENT)
Dept: OUTPATIENT SERVICES | Facility: HOSPITAL | Age: 59
LOS: 1 days | Discharge: HOME | End: 2019-06-25
Payer: MEDICAID

## 2019-06-25 DIAGNOSIS — M54.16 RADICULOPATHY, LUMBAR REGION: ICD-10-CM

## 2019-06-25 PROCEDURE — 72148 MRI LUMBAR SPINE W/O DYE: CPT | Mod: 26

## 2019-07-29 ENCOUNTER — RX RENEWAL (OUTPATIENT)
Age: 59
End: 2019-07-29

## 2019-07-30 ENCOUNTER — RX RENEWAL (OUTPATIENT)
Age: 59
End: 2019-07-30

## 2019-08-05 ENCOUNTER — APPOINTMENT (OUTPATIENT)
Dept: PODIATRY | Facility: CLINIC | Age: 59
End: 2019-08-05
Payer: MEDICAID

## 2019-08-05 ENCOUNTER — OUTPATIENT (OUTPATIENT)
Dept: OUTPATIENT SERVICES | Facility: HOSPITAL | Age: 59
LOS: 1 days | Discharge: HOME | End: 2019-08-05

## 2019-08-05 VITALS
HEART RATE: 90 BPM | WEIGHT: 290 LBS | SYSTOLIC BLOOD PRESSURE: 95 MMHG | HEIGHT: 70 IN | DIASTOLIC BLOOD PRESSURE: 67 MMHG | BODY MASS INDEX: 41.52 KG/M2

## 2019-08-05 PROCEDURE — 11720 DEBRIDE NAIL 1-5: CPT

## 2019-08-06 NOTE — REVIEW OF SYSTEMS
[Toenail Deformity] : toenail deformity [Change In Color Of Skin] : change in skin color [Toenail Thickening] : toenail thickening [Negative] : Musculoskeletal

## 2019-08-06 NOTE — HISTORY OF PRESENT ILLNESS
[FreeTextEntry1] : 59 year old M  presents for a diabetic foot evaluation. Mr. HARDIN denies any tingling burning in his feet. Last A1c was 8.4. \par

## 2019-08-06 NOTE — PHYSICAL EXAM
[General Appearance - Alert] : alert [General Appearance - In No Acute Distress] : in no acute distress [] : no rash [Skin Turgor] : normal skin turgor [Skin Lesions] : no skin lesions [#1 Diminished] : number 1 was diminished [#2 Diminished] : number 2 was diminished [Oriented To Time, Place, And Person] : oriented to person, place, and time [Vibration Dec.] : normal vibratory sensation at the level of the toes [#3 Diminished] : number 3 was normal [#6 Diminished] : number 6 was normal [#5 Diminished] : number 5 was normal [#8 Diminished] : number 8 was normal [#7 Diminished] : number 7 was normal [#9 Diminished] : number 9 was normal [#10 Diminished] : number 10 was normal [FreeTextEntry1] : thick, dystrophic, discolored nails with subungual debris x 3 right foot

## 2019-08-09 DIAGNOSIS — B35.1 TINEA UNGUIUM: ICD-10-CM

## 2019-08-09 DIAGNOSIS — M79.672 PAIN IN LEFT FOOT: ICD-10-CM

## 2019-08-09 DIAGNOSIS — M79.671 PAIN IN RIGHT FOOT: ICD-10-CM

## 2019-10-01 ENCOUNTER — EMERGENCY (EMERGENCY)
Facility: HOSPITAL | Age: 59
LOS: 0 days | Discharge: HOME | End: 2019-10-01
Attending: EMERGENCY MEDICINE | Admitting: EMERGENCY MEDICINE
Payer: COMMERCIAL

## 2019-10-01 VITALS
DIASTOLIC BLOOD PRESSURE: 71 MMHG | HEART RATE: 77 BPM | RESPIRATION RATE: 18 BRPM | SYSTOLIC BLOOD PRESSURE: 117 MMHG | TEMPERATURE: 97 F | OXYGEN SATURATION: 98 %

## 2019-10-01 VITALS
RESPIRATION RATE: 20 BRPM | SYSTOLIC BLOOD PRESSURE: 104 MMHG | HEART RATE: 94 BPM | OXYGEN SATURATION: 98 % | TEMPERATURE: 98 F | DIASTOLIC BLOOD PRESSURE: 59 MMHG

## 2019-10-01 DIAGNOSIS — S42.002A FRACTURE OF UNSPECIFIED PART OF LEFT CLAVICLE, INITIAL ENCOUNTER FOR CLOSED FRACTURE: ICD-10-CM
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
## 2019-10-01 DIAGNOSIS — S80.211A ABRASION, RIGHT KNEE, INITIAL ENCOUNTER: ICD-10-CM

## 2019-10-01 LAB
ALBUMIN SERPL ELPH-MCNC: 4.4 G/DL — SIGNIFICANT CHANGE UP (ref 3.5–5.2)
ALP SERPL-CCNC: 67 U/L — SIGNIFICANT CHANGE UP (ref 30–115)
ALT FLD-CCNC: 20 U/L — SIGNIFICANT CHANGE UP (ref 0–41)
ANION GAP SERPL CALC-SCNC: 17 MMOL/L — HIGH (ref 7–14)
APTT BLD: 34.2 SEC — SIGNIFICANT CHANGE UP (ref 27–39.2)
AST SERPL-CCNC: 53 U/L — HIGH (ref 0–41)
BASOPHILS # BLD AUTO: 0.04 K/UL — SIGNIFICANT CHANGE UP (ref 0–0.2)
BASOPHILS NFR BLD AUTO: 0.4 % — SIGNIFICANT CHANGE UP (ref 0–1)
BILIRUB SERPL-MCNC: 0.5 MG/DL — SIGNIFICANT CHANGE UP (ref 0.2–1.2)
BUN SERPL-MCNC: 26 MG/DL — HIGH (ref 10–20)
CALCIUM SERPL-MCNC: 9.4 MG/DL — SIGNIFICANT CHANGE UP (ref 8.5–10.1)
CHLORIDE SERPL-SCNC: 103 MMOL/L — SIGNIFICANT CHANGE UP (ref 98–110)
CO2 SERPL-SCNC: 18 MMOL/L — SIGNIFICANT CHANGE UP (ref 17–32)
CREAT SERPL-MCNC: 1.7 MG/DL — HIGH (ref 0.7–1.5)
EOSINOPHIL # BLD AUTO: 0.13 K/UL — SIGNIFICANT CHANGE UP (ref 0–0.7)
EOSINOPHIL NFR BLD AUTO: 1.2 % — SIGNIFICANT CHANGE UP (ref 0–8)
ETHANOL SERPL-MCNC: <10 MG/DL — SIGNIFICANT CHANGE UP
GLUCOSE SERPL-MCNC: 202 MG/DL — HIGH (ref 70–99)
HCT VFR BLD CALC: 45.5 % — SIGNIFICANT CHANGE UP (ref 42–52)
HGB BLD-MCNC: 15 G/DL — SIGNIFICANT CHANGE UP (ref 14–18)
IMM GRANULOCYTES NFR BLD AUTO: 0.5 % — HIGH (ref 0.1–0.3)
INR BLD: 0.96 RATIO — SIGNIFICANT CHANGE UP (ref 0.65–1.3)
LACTATE SERPL-SCNC: 1.6 MMOL/L — SIGNIFICANT CHANGE UP (ref 0.5–2.2)
LIDOCAIN IGE QN: 72 U/L — HIGH (ref 7–60)
LYMPHOCYTES # BLD AUTO: 1.49 K/UL — SIGNIFICANT CHANGE UP (ref 1.2–3.4)
LYMPHOCYTES # BLD AUTO: 13.7 % — LOW (ref 20.5–51.1)
MCHC RBC-ENTMCNC: 28.2 PG — SIGNIFICANT CHANGE UP (ref 27–31)
MCHC RBC-ENTMCNC: 33 G/DL — SIGNIFICANT CHANGE UP (ref 32–37)
MCV RBC AUTO: 85.7 FL — SIGNIFICANT CHANGE UP (ref 80–94)
MONOCYTES # BLD AUTO: 0.88 K/UL — HIGH (ref 0.1–0.6)
MONOCYTES NFR BLD AUTO: 8.1 % — SIGNIFICANT CHANGE UP (ref 1.7–9.3)
NEUTROPHILS # BLD AUTO: 8.28 K/UL — HIGH (ref 1.4–6.5)
NEUTROPHILS NFR BLD AUTO: 76.1 % — HIGH (ref 42.2–75.2)
NRBC # BLD: 0 /100 WBCS — SIGNIFICANT CHANGE UP (ref 0–0)
PLATELET # BLD AUTO: 306 K/UL — SIGNIFICANT CHANGE UP (ref 130–400)
POTASSIUM SERPL-MCNC: 6.1 MMOL/L — CRITICAL HIGH (ref 3.5–5)
POTASSIUM SERPL-SCNC: 6.1 MMOL/L — CRITICAL HIGH (ref 3.5–5)
PROT SERPL-MCNC: 7.6 G/DL — SIGNIFICANT CHANGE UP (ref 6–8)
PROTHROM AB SERPL-ACNC: 11 SEC — SIGNIFICANT CHANGE UP (ref 9.95–12.87)
RBC # BLD: 5.31 M/UL — SIGNIFICANT CHANGE UP (ref 4.7–6.1)
RBC # FLD: 13.3 % — SIGNIFICANT CHANGE UP (ref 11.5–14.5)
SODIUM SERPL-SCNC: 138 MMOL/L — SIGNIFICANT CHANGE UP (ref 135–146)
WBC # BLD: 10.87 K/UL — HIGH (ref 4.8–10.8)
WBC # FLD AUTO: 10.87 K/UL — HIGH (ref 4.8–10.8)

## 2019-10-01 PROCEDURE — 73080 X-RAY EXAM OF ELBOW: CPT | Mod: 26,LT

## 2019-10-01 PROCEDURE — 71260 CT THORAX DX C+: CPT | Mod: 26

## 2019-10-01 PROCEDURE — 73090 X-RAY EXAM OF FOREARM: CPT | Mod: 26,LT

## 2019-10-01 PROCEDURE — 99284 EMERGENCY DEPT VISIT MOD MDM: CPT

## 2019-10-01 PROCEDURE — 74177 CT ABD & PELVIS W/CONTRAST: CPT | Mod: 26

## 2019-10-01 PROCEDURE — 71045 X-RAY EXAM CHEST 1 VIEW: CPT | Mod: 26

## 2019-10-01 PROCEDURE — 72125 CT NECK SPINE W/O DYE: CPT | Mod: 26

## 2019-10-01 PROCEDURE — 73060 X-RAY EXAM OF HUMERUS: CPT | Mod: 26,LT

## 2019-10-01 PROCEDURE — 73562 X-RAY EXAM OF KNEE 3: CPT | Mod: 26,LT

## 2019-10-01 PROCEDURE — 72170 X-RAY EXAM OF PELVIS: CPT | Mod: 26

## 2019-10-01 PROCEDURE — 70450 CT HEAD/BRAIN W/O DYE: CPT | Mod: 26
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
## 2019-10-01 NOTE — ED PROVIDER NOTE - PHYSICAL EXAMINATION
Vital Signs: I have reviewed the initial vital signs.  Constitutional: NAD, well-nourished, appears stated age, no acute distress.  HEENT: Airway patent, moist MM, no erythema/swelling/deformity of oral structures. EOMI, PERRLA.  CV: regular rate, regular rhythm, well-perfused extremities, 2+ b/l DP and radial pulses equal.  Lungs: BCTA, no increased WOB.  ABD: NTND, no guarding or rebound, no pulsatile mass, no hernias.   MSK: Neck supple, ttp over cervical spine, no stepoff. Chest nontender. Back ttp in lumbar spine. abrasions over L knee, ttp over L olecranon.   INTEG: Skin warm, dry, no rash.  NEURO: A&Ox3, moving all extremities, normal speech  PSYCH: Calm, cooperative, normal affect and interaction.
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
## 2019-10-01 NOTE — ED PROVIDER NOTE - PATIENT PORTAL LINK FT
You can access the FollowMyHealth Patient Portal offered by [facility] by registering at the following website: [email or web address]. By joining [employer]’s FollowMyHealth portal, you will also be able to view your health information using other applications (apps) compatible with our system.
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
## 2019-10-01 NOTE — ED PROVIDER NOTE - PROGRESS NOTE DETAILS
Pt received CT and Xrays, which show a minimally displaced left clavicular fracture. Pt able to ambulate, advised to follow-up with outpatient trauma and orthopedic clinic. Patient to be discharged from ED. Any available test results were discussed with patient and/or family. Verbal instructions given, including instructions to return to ED immediately for any new, worsening, or concerning symptoms. Patient endorsed understanding. Written discharge instructions additionally given, including follow-up plan.
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
## 2019-10-01 NOTE — ED PROVIDER NOTE - NSFOLLOWUPCLINICS_GEN_ALL_ED_FT
Patient would like communication of their results via:        Cell Phone:   Telephone Information:   Mobile [phone]     Okay to leave a message containing results? Yes     [facility]  Orthopedics  .  [place]   Phone: [phone]  Fax:   Follow Up Time: 4-6 Days    [facility]  Trauma Surgery  [address] [place] [address]  Phone: [phone]  Fax:   Follow Up Time: 4-6 Days
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
## 2019-10-14 ENCOUNTER — APPOINTMENT (OUTPATIENT)
Dept: SURGERY | Facility: CLINIC | Age: 59
End: 2019-10-14
Payer: MEDICAID

## 2019-10-14 VITALS
WEIGHT: 280 LBS | DIASTOLIC BLOOD PRESSURE: 78 MMHG | SYSTOLIC BLOOD PRESSURE: 118 MMHG | BODY MASS INDEX: 40.09 KG/M2 | HEIGHT: 70 IN

## 2019-10-14 DIAGNOSIS — S42.009G FRACTURE OF UNSPECIFIED PART OF UNSPECIFIED CLAVICLE, SUBSEQUENT ENCOUNTER FOR FRACTURE WITH DELAYED HEALING: ICD-10-CM

## 2019-10-14 PROCEDURE — 99213 OFFICE O/P EST LOW 20 MIN: CPT
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
## 2019-10-21 ENCOUNTER — APPOINTMENT (OUTPATIENT)
Dept: PODIATRY | Facility: CLINIC | Age: 59
End: 2019-10-21
Payer: MEDICAID

## 2019-10-21 ENCOUNTER — OUTPATIENT (OUTPATIENT)
Dept: OUTPATIENT SERVICES | Facility: HOSPITAL | Age: 59
LOS: 1 days | Discharge: HOME | End: 2019-10-21

## 2019-10-21 VITALS
WEIGHT: 280 LBS | BODY MASS INDEX: 40.09 KG/M2 | DIASTOLIC BLOOD PRESSURE: 79 MMHG | HEIGHT: 70 IN | HEART RATE: 100 BPM | SYSTOLIC BLOOD PRESSURE: 128 MMHG

## 2019-10-21 DIAGNOSIS — B35.1 TINEA UNGUIUM: ICD-10-CM

## 2019-10-21 DIAGNOSIS — E11.40 TYPE 2 DIABETES MELLITUS WITH DIABETIC NEUROPATHY, UNSPECIFIED: ICD-10-CM

## 2019-10-21 PROCEDURE — 11720 DEBRIDE NAIL 1-5: CPT

## 2019-10-21 RX ORDER — CICLOPIROX 80 MG/ML
8 SOLUTION TOPICAL
Qty: 1 | Refills: 6 | Status: ACTIVE | COMMUNITY
Start: 2019-10-21 | End: 1900-01-01

## 2019-10-21 NOTE — ASSESSMENT
[FreeTextEntry1] : \par -Aseptic debridement of mycotic nails x 10\par -Rx ciclopirox 8%\par -Return 2 month\par

## 2019-10-21 NOTE — PHYSICAL EXAM
[General Appearance - In No Acute Distress] : in no acute distress [General Appearance - Alert] : alert [Skin Turgor] : normal skin turgor [] : no rash [Skin Lesions] : no skin lesions [#1 Diminished] : number 1 was diminished [#2 Diminished] : number 2 was diminished [Oriented To Time, Place, And Person] : oriented to person, place, and time [Vibration Dec.] : normal vibratory sensation at the level of the toes [#3 Diminished] : number 3 was normal [#5 Diminished] : number 5 was normal [#6 Diminished] : number 6 was normal [#7 Diminished] : number 7 was normal [#8 Diminished] : number 8 was normal [#9 Diminished] : number 9 was normal [#10 Diminished] : number 10 was normal [FreeTextEntry1] : thick, dystrophic, discolored nails with subungual debris x 3 right foot

## 2019-11-13 ENCOUNTER — RX RENEWAL (OUTPATIENT)
Age: 59
End: 2019-11-13

## 2019-12-05 ENCOUNTER — OUTPATIENT (OUTPATIENT)
Dept: OUTPATIENT SERVICES | Facility: HOSPITAL | Age: 59
LOS: 1 days | Discharge: HOME | End: 2019-12-05

## 2019-12-05 ENCOUNTER — APPOINTMENT (OUTPATIENT)
Dept: ENDOCRINOLOGY | Facility: CLINIC | Age: 59
End: 2019-12-05

## 2019-12-05 VITALS
BODY MASS INDEX: 40.09 KG/M2 | HEIGHT: 70 IN | SYSTOLIC BLOOD PRESSURE: 112 MMHG | WEIGHT: 280 LBS | HEART RATE: 83 BPM | DIASTOLIC BLOOD PRESSURE: 67 MMHG

## 2019-12-05 DIAGNOSIS — I10 ESSENTIAL (PRIMARY) HYPERTENSION: ICD-10-CM

## 2019-12-05 RX ORDER — LISINOPRIL 10 MG/1
10 TABLET ORAL
Qty: 90 | Refills: 1 | Status: ACTIVE | COMMUNITY
Start: 2019-06-06

## 2019-12-05 RX ORDER — EMPAGLIFLOZIN 10 MG/1
10 TABLET, FILM COATED ORAL
Qty: 30 | Refills: 5 | Status: COMPLETED | COMMUNITY
Start: 2017-07-13 | End: 2019-12-05

## 2019-12-05 RX ORDER — CLOTRIMAZOLE 10 MG/ML
1 SOLUTION TOPICAL
Qty: 1 | Refills: 2 | Status: DISCONTINUED | COMMUNITY
Start: 2018-03-07 | End: 2019-12-05

## 2019-12-05 NOTE — PHYSICAL EXAM
[Alert] : alert [No Acute Distress] : no acute distress [Normal Sclera/Conjunctiva] : normal sclera/conjunctiva [Normal Rate and Effort] : normal respiratory rhythm and effort [No Respiratory Distress] : no respiratory distress [No Accessory Muscle Use] : no accessory muscle use [Normal Rate] : heart rate was normal  [Normal S1, S2] : normal S1 and S2 [Normal Bowel Sounds] : normal bowel sounds [Not Tender] : non-tender [No CVA Tenderness] : no ~M costovertebral angle tenderness [Oriented x3] : oriented to person, place, and time [No Stigmata of Cushings Syndrome] : no stigmata of cushings syndrome

## 2019-12-06 NOTE — REVIEW OF SYSTEMS
[As Noted in HPI] : as noted in HPI [Negative] : Neurological [Recent Weight Loss (___ Lbs)] : no recent weight loss [Blurry Vision] : no blurred vision [Chest Pain] : no chest pain [Nausea] : no nausea [Shortness Of Breath] : no shortness of breath [Vomiting] : no vomiting was observed [Polyuria] : no polyuria [Heat Intolerance] : heat tolerant [Cold Intolerance] : cold tolerant [Polydipsia] : no polydipsia

## 2019-12-06 NOTE — HISTORY OF PRESENT ILLNESS
[FreeTextEntry1] : 60 yo with type II diabetes and HTN presents for follow up. Compliant with all medications. preprandial sugars between 115-120. trying to lose weight but admits it is more difficult with holiday season.

## 2019-12-06 NOTE — ASSESSMENT
[FreeTextEntry1] : #DM type II\par  hba1c- 8.4, lipid profile, tsh, LFTs - all else WNL \par keep same meds \par f up in 6 months\par \par HTN, controlled (today 112/60) \par decreased lisinopril to 10 because of dizziness and dizziness improved \par \par .

## 2019-12-10 DIAGNOSIS — E66.01 MORBID (SEVERE) OBESITY DUE TO EXCESS CALORIES: ICD-10-CM

## 2019-12-10 DIAGNOSIS — E11.22 TYPE 2 DIABETES MELLITUS WITH DIABETIC CHRONIC KIDNEY DISEASE: ICD-10-CM

## 2019-12-10 DIAGNOSIS — E11.65 TYPE 2 DIABETES MELLITUS WITH HYPERGLYCEMIA: ICD-10-CM

## 2019-12-30 ENCOUNTER — APPOINTMENT (OUTPATIENT)
Dept: PODIATRY | Facility: CLINIC | Age: 59
End: 2019-12-30

## 2020-01-27 ENCOUNTER — OUTPATIENT (OUTPATIENT)
Dept: OUTPATIENT SERVICES | Facility: HOSPITAL | Age: 60
LOS: 1 days | Discharge: HOME | End: 2020-01-27

## 2020-01-27 ENCOUNTER — APPOINTMENT (OUTPATIENT)
Dept: PODIATRY | Facility: CLINIC | Age: 60
End: 2020-01-27
Payer: MEDICAID

## 2020-01-27 DIAGNOSIS — B35.1 TINEA UNGUIUM: ICD-10-CM

## 2020-01-27 PROCEDURE — 11720 DEBRIDE NAIL 1-5: CPT

## 2020-01-27 RX ORDER — CICLOPIROX 80 MG/ML
8 SOLUTION TOPICAL
Qty: 1 | Refills: 0 | Status: ACTIVE | COMMUNITY
Start: 2020-01-27 | End: 1900-01-01

## 2020-01-27 NOTE — ASSESSMENT
[FreeTextEntry1] : Mycotic Toenails\par \par -Aseptic debridement of mycotic nails\par -Rx ciclopirox 8%\par -Return 3 month\par

## 2020-01-27 NOTE — PHYSICAL EXAM
[General Appearance - Alert] : alert [General Appearance - In No Acute Distress] : in no acute distress [Skin Turgor] : normal skin turgor [] : no rash [Skin Lesions] : no skin lesions [#1 Diminished] : number 1 was diminished [#2 Diminished] : number 2 was diminished [Oriented To Time, Place, And Person] : oriented to person, place, and time [Vibration Dec.] : normal vibratory sensation at the level of the toes [#3 Diminished] : number 3 was normal [#5 Diminished] : number 5 was normal [#6 Diminished] : number 6 was normal [#7 Diminished] : number 7 was normal [#8 Diminished] : number 8 was normal [#9 Diminished] : number 9 was normal [#10 Diminished] : number 10 was normal [FreeTextEntry1] : thick, dystrophic, discolored nails with subungual debris x 3 right foot

## 2020-02-24 ENCOUNTER — OUTPATIENT (OUTPATIENT)
Dept: OUTPATIENT SERVICES | Facility: HOSPITAL | Age: 60
LOS: 1 days | Discharge: HOME | End: 2020-02-24
Payer: MEDICAID

## 2020-02-24 PROCEDURE — 92014 COMPRE OPH EXAM EST PT 1/>: CPT

## 2020-02-24 PROCEDURE — 92134 CPTRZ OPH DX IMG PST SGM RTA: CPT | Mod: 26

## 2020-02-27 DIAGNOSIS — H25.813 COMBINED FORMS OF AGE-RELATED CATARACT, BILATERAL: ICD-10-CM

## 2020-02-27 DIAGNOSIS — H02.88B MEIBOMIAN GLAND DYSFUNCTION LEFT EYE, UPPER AND LOWER EYELIDS: ICD-10-CM

## 2020-02-27 DIAGNOSIS — E11.3293 TYPE 2 DIABETES MELLITUS WITH MILD NONPROLIFERATIVE DIABETIC RETINOPATHY WITHOUT MACULAR EDEMA, BILATERAL: ICD-10-CM

## 2020-02-27 DIAGNOSIS — H43.391 OTHER VITREOUS OPACITIES, RIGHT EYE: ICD-10-CM

## 2020-02-27 DIAGNOSIS — H35.039 HYPERTENSIVE RETINOPATHY, UNSPECIFIED EYE: ICD-10-CM

## 2020-02-27 DIAGNOSIS — H02.88A MEIBOMIAN GLAND DYSFUNCTION RIGHT EYE, UPPER AND LOWER EYELIDS: ICD-10-CM

## 2020-04-27 ENCOUNTER — APPOINTMENT (OUTPATIENT)
Dept: PODIATRY | Facility: CLINIC | Age: 60
End: 2020-04-27
Payer: MEDICAID

## 2020-04-27 ENCOUNTER — OUTPATIENT (OUTPATIENT)
Dept: OUTPATIENT SERVICES | Facility: HOSPITAL | Age: 60
LOS: 1 days | Discharge: HOME | End: 2020-04-27

## 2020-04-27 VITALS
DIASTOLIC BLOOD PRESSURE: 70 MMHG | HEIGHT: 70 IN | WEIGHT: 279 LBS | BODY MASS INDEX: 39.94 KG/M2 | SYSTOLIC BLOOD PRESSURE: 124 MMHG | HEART RATE: 76 BPM

## 2020-04-27 PROCEDURE — 11721 DEBRIDE NAIL 6 OR MORE: CPT

## 2020-04-27 RX ORDER — AMMONIUM LACTATE 12 %
12 LOTION (GRAM) TOPICAL TWICE DAILY
Qty: 1 | Refills: 5 | Status: ACTIVE | COMMUNITY
Start: 2020-04-27 | End: 1900-01-01

## 2020-04-27 RX ORDER — CICLOPIROX 80 MG/ML
8 SOLUTION TOPICAL
Qty: 1 | Refills: 5 | Status: ACTIVE | COMMUNITY
Start: 2020-04-27 | End: 1900-01-01

## 2020-04-27 NOTE — PHYSICAL EXAM
[General Appearance - In No Acute Distress] : in no acute distress [General Appearance - Alert] : alert [Skin Turgor] : normal skin turgor [Skin Lesions] : no skin lesions [] : no rash [#1 Diminished] : number 1 was diminished [#2 Diminished] : number 2 was diminished [Oriented To Time, Place, And Person] : oriented to person, place, and time [Vibration Dec.] : normal vibratory sensation at the level of the toes [#3 Diminished] : number 3 was normal [#5 Diminished] : number 5 was normal [#6 Diminished] : number 6 was normal [#8 Diminished] : number 8 was normal [#7 Diminished] : number 7 was normal [#9 Diminished] : number 9 was normal [#10 Diminished] : number 10 was normal [FreeTextEntry1] : Diminished protective sensation B/L feet

## 2020-04-27 NOTE — ASSESSMENT
[FreeTextEntry1] : Mycotic Toenails\par \par -Aseptic debridement of mycotic nails\par -Rx ciclopirox 8%\par -Rx Amlactin \par -Return 3 month\par

## 2020-05-19 ENCOUNTER — APPOINTMENT (OUTPATIENT)
Dept: NEPHROLOGY | Facility: CLINIC | Age: 60
End: 2020-05-19

## 2020-06-04 ENCOUNTER — APPOINTMENT (OUTPATIENT)
Dept: ENDOCRINOLOGY | Facility: CLINIC | Age: 60
End: 2020-06-04

## 2020-06-16 ENCOUNTER — OUTPATIENT (OUTPATIENT)
Dept: OUTPATIENT SERVICES | Facility: HOSPITAL | Age: 60
LOS: 1 days | Discharge: HOME | End: 2020-06-16

## 2020-06-16 ENCOUNTER — APPOINTMENT (OUTPATIENT)
Dept: NEPHROLOGY | Facility: CLINIC | Age: 60
End: 2020-06-16
Payer: MEDICAID

## 2020-06-16 PROCEDURE — 99212 OFFICE O/P EST SF 10 MIN: CPT | Mod: GC

## 2020-06-16 NOTE — HISTORY OF PRESENT ILLNESS
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 No current complaints

## 2020-06-16 NOTE — ASSESSMENT
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 and GFR\par - Urinalysis 5/2019 demonstrated No proteinuria\par - Urine Protein:Cr. ratio 0\par - Continue renal diet ( Low sodium and Low potassium\par - Repeat UA for evaluation of protein  \par \par RTC in 6 months

## 2020-07-14 RX ORDER — EZETIMIBE 10 MG/1
10 TABLET ORAL
Qty: 90 | Refills: 3 | Status: ACTIVE | COMMUNITY
Start: 2018-04-18 | End: 1900-01-01

## 2020-07-27 ENCOUNTER — OUTPATIENT (OUTPATIENT)
Dept: OUTPATIENT SERVICES | Facility: HOSPITAL | Age: 60
LOS: 1 days | Discharge: HOME | End: 2020-07-27

## 2020-07-27 ENCOUNTER — APPOINTMENT (OUTPATIENT)
Dept: PODIATRY | Facility: CLINIC | Age: 60
End: 2020-07-27
Payer: MEDICAID

## 2020-07-27 PROCEDURE — 11720 DEBRIDE NAIL 1-5: CPT

## 2020-07-27 NOTE — PHYSICAL EXAM
[General Appearance - Alert] : alert [General Appearance - In No Acute Distress] : in no acute distress [Skin Turgor] : normal skin turgor [] : no rash [Skin Lesions] : no skin lesions [#2 Diminished] : number 2 was diminished [#1 Diminished] : number 1 was diminished [Oriented To Time, Place, And Person] : oriented to person, place, and time [Vibration Dec.] : normal vibratory sensation at the level of the toes [#3 Diminished] : number 3 was normal [#6 Diminished] : number 6 was normal [#5 Diminished] : number 5 was normal [#7 Diminished] : number 7 was normal [#8 Diminished] : number 8 was normal [#9 Diminished] : number 9 was normal [#10 Diminished] : number 10 was normal [FreeTextEntry1] : Diminished protective sensation B/L feet

## 2020-07-27 NOTE — ASSESSMENT
[FreeTextEntry1] : Mycotic Toenails\par \par -Aseptic debridement of mycotic nails\par -continue ciclopirox 8%\par -Return 3 month\par

## 2020-08-10 ENCOUNTER — OUTPATIENT (OUTPATIENT)
Dept: OUTPATIENT SERVICES | Facility: HOSPITAL | Age: 60
LOS: 1 days | Discharge: HOME | End: 2020-08-10

## 2020-08-10 DIAGNOSIS — M54.89 OTHER DORSALGIA: ICD-10-CM

## 2020-08-13 ENCOUNTER — EMERGENCY (EMERGENCY)
Facility: HOSPITAL | Age: 60
LOS: 0 days | Discharge: HOME | End: 2020-08-13
Attending: EMERGENCY MEDICINE | Admitting: EMERGENCY MEDICINE
Payer: MEDICAID

## 2020-08-13 VITALS
HEART RATE: 104 BPM | DIASTOLIC BLOOD PRESSURE: 63 MMHG | OXYGEN SATURATION: 97 % | RESPIRATION RATE: 18 BRPM | TEMPERATURE: 98 F | SYSTOLIC BLOOD PRESSURE: 112 MMHG

## 2020-08-13 DIAGNOSIS — E11.9 TYPE 2 DIABETES MELLITUS WITHOUT COMPLICATIONS: ICD-10-CM

## 2020-08-13 DIAGNOSIS — K08.89 OTHER SPECIFIED DISORDERS OF TEETH AND SUPPORTING STRUCTURES: ICD-10-CM

## 2020-08-13 PROCEDURE — 99282 EMERGENCY DEPT VISIT SF MDM: CPT

## 2020-08-13 NOTE — ED PROVIDER NOTE - NS ED ROS FT
CONST: No fever, chills or bodyaches  EYES: No pain, redness, drainage or visual changes.  ENT: (+) right upper back tooth pain. No ear pain or discharge, nasal discharge or congestion. No sore throat  CARD: No chest pain, palpitations  RESP: No SOB, cough, hemoptysis. No hx of asthma or COPD  GI: No abdominal pain, N/V/D  : No urinary symptoms  MS: No joint pain, back pain or extremity pain/injury  SKIN: No rashes  NEURO: No headache, dizziness, paresthesias or LOC

## 2020-08-13 NOTE — ED PROVIDER NOTE - OBJECTIVE STATEMENT
61 y/o male with a PMH of DM presents to the ED for evaluation of intermittent sharp nonradiating right upper back tooth pain x 5 days. Pt reports after eating cereal he noticed his right upper back tooth became painful and felt loose. pt reports pain is worse with eating. pt denies fever, chills, oral surgery, bleeding from the tooth, drooling, difficulty breathing, difficulty swallowing, or recent trauma.

## 2020-08-13 NOTE — ED PROVIDER NOTE - ATTENDING CONTRIBUTION TO CARE
61 yo M presented to ED for tooth pain x 5days. He was eating cereal and noticed that his back R molar was loose. No fever, chills, swelling.     Pt in NAD  Cardio RRR  Lungs CTA  Abdomen SNTND  Pt has loose tooth at tooth #1    Will send to dental clinic

## 2020-08-13 NOTE — ED PROVIDER NOTE - PHYSICAL EXAMINATION
Physical Exam    Vital Signs: I have reviewed the initial vital signs.  Constitutional: well-nourished, appears stated age, no acute distress  ENT: (+) tooth #1 is tender to the touch and loose. poor dentition. no obvious dental abscess, or tenderness and erythema over surrounding gingiva. Oropharynx is clear with lesions. uvula midline. no tonsillar erythema, edema, or exudates. no stridor. no pta.   Integumentary: warm, dry, no rash  Neurologic: awake, alert  Psychiatric: appropriate mood, appropriate affect

## 2020-08-13 NOTE — CONSULT NOTE ADULT - SUBJECTIVE AND OBJECTIVE BOX
Patient is a 60y old  Male who presents with a chief complaint of tooth fracture and pain (points to #2)    HPI: Patient said that tooth #2 fractured about 6 months ago but was not having pain from it. Then on Monday (8/10/20) he was eating cereal and it cracked more. Now it is mobile and hurting.      PAST MEDICAL & SURGICAL HISTORY:  Diabetes  No significant past surgical history    ( -  ) heart valve replacement  ( -  ) joint replacement  ( -  ) pregnancy    MEDICATIONS  (STANDING): Gabapentin, Ozempic, Tresiba    MEDICATIONS  (PRN): None    Allergies    No Known Allergies    Intolerances      *SOCIAL HISTORY: (   ) Tobacco; (   ) ETOH    *Last Dental Visit:    Vital Signs Last 24 Hrs  T(C): 36.6 (13 Aug 2020 07:37), Max: 36.6 (13 Aug 2020 07:37)  T(F): 97.8 (13 Aug 2020 07:37), Max: 97.8 (13 Aug 2020 07:37)  HR: 104 (13 Aug 2020 07:37) (104 - 104)  BP: 112/63 (13 Aug 2020 07:37) (112/63 - 112/63)  BP(mean): --  RR: 18 (13 Aug 2020 07:37) (18 - 18)  SpO2: 97% (13 Aug 2020 07:37) (97% - 97%)    EOE:  TMJ ( -  ) clicks                     ( -  ) pops                     ( -  ) crepitus             Mandible <<FROM>>             Facial bones and MOM <<grossly intact>>             ( -  ) trismus             ( -  ) lymphadenopathy             ( -  ) swelling             ( -  ) asymmetry             ( -  ) palpation             ( -  ) dyspnea             ( -  ) dysphagia             ( -  ) loss of consciousness    IOE:  permanent dentition: multiple carious teeth and multiple missing teeth           hard/soft palate:  ( - ) palatal torus, <<No pathology noted>>           tongue/FOM <<No pathology noted>>           labial/buccal mucosa <<No pathology noted>>           ( +  ) percussion - (#2)           ( -  ) palpation           ( -  ) swelling            ( -  ) abscess           ( -  ) sinus tract    Dentition present: permanent dentition with multiple missing as well as carious teeth    *DENTAL RADIOGRAPHS: 1 periapical radiograph taken of #1 and #2    *ASSESSMENT: Class 2 mobility, non-restorable #1 and #2    *PLAN: Extraction of #1 and #2    PROCEDURE:   All treatment risks and benefits discussed as per OS sheet dated 7/13/00. Consent obtained. Side site marked. Administered 1 carpule of 4% septocaine with 1:100,000 epinephrine. Used periosteal elevator, forceps and ronjeur to extract #1 and #2. Took postoperative periapical radiograph. Hemostasis achieved. Post-operative instructions given. No complications. Prescribed Amoxicillin 500mg 3 times a day for 7 days and Ibuprofen 600mg PRN.    RECOMMENDATIONS:  1) Take prescribed medication as indicated.  2) Dental F/U with outpatient dentist for comprehensive dental care.   3) If any difficulty swallowing/breathing, fever occur, return to ER.

## 2020-08-16 ENCOUNTER — RX RENEWAL (OUTPATIENT)
Age: 60
End: 2020-08-16

## 2020-09-12 ENCOUNTER — RX RENEWAL (OUTPATIENT)
Age: 60
End: 2020-09-12

## 2020-10-05 ENCOUNTER — APPOINTMENT (OUTPATIENT)
Dept: PODIATRY | Facility: CLINIC | Age: 60
End: 2020-10-05
Payer: MEDICAID

## 2020-10-05 ENCOUNTER — OUTPATIENT (OUTPATIENT)
Dept: OUTPATIENT SERVICES | Facility: HOSPITAL | Age: 60
LOS: 1 days | Discharge: HOME | End: 2020-10-05

## 2020-10-05 PROCEDURE — 99213 OFFICE O/P EST LOW 20 MIN: CPT | Mod: 25

## 2020-10-05 PROCEDURE — 11721 DEBRIDE NAIL 6 OR MORE: CPT

## 2020-10-06 NOTE — ASSESSMENT
[FreeTextEntry1] : -Loss of protective sensation: yes \par -Presence of foot deformity:  yes  \par -presence of pre-ulcerative lesion: no\par   -hemorrhage into callus: no\par -atrophy of heel or metatarsal fat pads:  no\par \par -Diabetic neurovascular foot assessment  performed. \par -Discussed with patient diabetic foot hygiene.Patient instructed to regularly check the bottom of the feet\par -Aseptic debridement of all fungal nails.  Patient has pain about the toes secondary to nail pressure and relates improvement with periodic debridement.\par -Return 6 month\par

## 2020-10-06 NOTE — PHYSICAL EXAM
[General Appearance - Alert] : alert [General Appearance - In No Acute Distress] : in no acute distress [1+] : left foot posterior tibialis 1+ [Pes Cavus] : pes cavus deformity [Skin Lesions] : no skin lesions [Vibration Dec.] : diminished vibratory sensation at the level of the toes [Oriented To Time, Place, And Person] : oriented to person, place, and time [Delayed in the Right Toes] : capillary refills normal in right toes [Delayed in the Left Toes] : capillary refills normal in the left toes [FreeTextEntry3] : presence of pedal hair [Foot Ulcer] : no foot ulcer [FreeTextEntry1] : thick, dystrophic, discolored nails with subungual debris x 10\par  [Diminished Throughout Right Foot] : normal sensation with monofilament testing throughout right foot [Diminished Throughout Left Foot] : normal sensation with monofilament testing throughout left foot

## 2020-10-06 NOTE — HISTORY OF PRESENT ILLNESS
[FreeTextEntry1] : 60 year old M  presents for a diabetic foot evaluation. Mr. HARDIN denies any tingling burning in his feet. Last A1c was 8.4 9/2019\par \par

## 2020-11-04 ENCOUNTER — NON-APPOINTMENT (OUTPATIENT)
Age: 60
End: 2020-11-04

## 2020-11-05 ENCOUNTER — APPOINTMENT (OUTPATIENT)
Dept: ENDOCRINOLOGY | Facility: CLINIC | Age: 60
End: 2020-11-05

## 2020-11-05 ENCOUNTER — OUTPATIENT (OUTPATIENT)
Dept: OUTPATIENT SERVICES | Facility: HOSPITAL | Age: 60
LOS: 1 days | Discharge: HOME | End: 2020-11-05

## 2020-11-05 DIAGNOSIS — Z12.5 ENCOUNTER FOR SCREENING FOR MALIGNANT NEOPLASM OF PROSTATE: ICD-10-CM

## 2020-11-05 DIAGNOSIS — E11.8 TYPE 2 DIABETES MELLITUS WITH UNSPECIFIED COMPLICATIONS: ICD-10-CM

## 2020-11-05 DIAGNOSIS — N18.30 CHRONIC KIDNEY DISEASE, STAGE 3 UNSPECIFIED: ICD-10-CM

## 2020-11-05 DIAGNOSIS — E78.2 MIXED HYPERLIPIDEMIA: ICD-10-CM

## 2020-11-05 NOTE — ASSESSMENT
[FreeTextEntry1] : continue current meds, needs labs. [Diabetes Foot Care] : diabetes foot care [Long Term Vascular Complications] : long term vascular complications of diabetes [Carbohydrate Consistent Diet] : carbohydrate consistent diet [Importance of Diet and Exercise] : importance of diet and exercise to improve glycemic control, achieve weight loss and improve cardiovascular health [Retinopathy Screening] : Patient was referred to ophthalmology for retinopathy screening

## 2020-11-09 LAB
25(OH)D3 SERPL-MCNC: 55 NG/ML
ALBUMIN SERPL ELPH-MCNC: 4.4 G/DL
ALP BLD-CCNC: 79 U/L
ALT SERPL-CCNC: 14 U/L
ANION GAP SERPL CALC-SCNC: 9 MMOL/L
AST SERPL-CCNC: 15 U/L
BASOPHILS # BLD AUTO: 0.06 K/UL
BASOPHILS NFR BLD AUTO: 0.7 %
BILIRUB SERPL-MCNC: 0.6 MG/DL
BUN SERPL-MCNC: 39 MG/DL
CALCIUM SERPL-MCNC: 9.9 MG/DL
CHLORIDE SERPL-SCNC: 104 MMOL/L
CHOLEST SERPL-MCNC: 124 MG/DL
CO2 SERPL-SCNC: 28 MMOL/L
CREAT SERPL-MCNC: 1.9 MG/DL
CREAT SPEC-SCNC: 82 MG/DL
EOSINOPHIL # BLD AUTO: 0.13 K/UL
EOSINOPHIL NFR BLD AUTO: 1.4 %
ESTIMATED AVERAGE GLUCOSE: 157 MG/DL
GLUCOSE SERPL-MCNC: 116 MG/DL
HBA1C MFR BLD HPLC: 7.1 %
HCT VFR BLD CALC: 45.7 %
HDLC SERPL-MCNC: 32 MG/DL
HGB BLD-MCNC: 14.2 G/DL
IMM GRANULOCYTES NFR BLD AUTO: 0.6 %
LDLC SERPL CALC-MCNC: 68 MG/DL
LYMPHOCYTES # BLD AUTO: 1.43 K/UL
LYMPHOCYTES NFR BLD AUTO: 15.8 %
MAN DIFF?: NORMAL
MCHC RBC-ENTMCNC: 28.5 PG
MCHC RBC-ENTMCNC: 31.1 G/DL
MCV RBC AUTO: 91.8 FL
MICROALBUMIN 24H UR DL<=1MG/L-MCNC: <1.2 MG/DL
MICROALBUMIN/CREAT 24H UR-RTO: NORMAL MG/G
MONOCYTES # BLD AUTO: 0.77 K/UL
MONOCYTES NFR BLD AUTO: 8.5 %
NEUTROPHILS # BLD AUTO: 6.62 K/UL
NEUTROPHILS NFR BLD AUTO: 73 %
NONHDLC SERPL-MCNC: 92 MG/DL
PLATELET # BLD AUTO: 340 K/UL
POTASSIUM SERPL-SCNC: 5.5 MMOL/L
PROT SERPL-MCNC: 6.9 G/DL
PSA SERPL-MCNC: 0.53 NG/ML
RBC # BLD: 4.98 M/UL
RBC # FLD: 13.4 %
SODIUM SERPL-SCNC: 141 MMOL/L
TRIGL SERPL-MCNC: 151 MG/DL
TSH SERPL-ACNC: 1.73 UIU/ML
WBC # FLD AUTO: 9.06 K/UL

## 2020-11-20 ENCOUNTER — RX RENEWAL (OUTPATIENT)
Age: 60
End: 2020-11-20

## 2020-12-21 ENCOUNTER — OUTPATIENT (OUTPATIENT)
Dept: OUTPATIENT SERVICES | Facility: HOSPITAL | Age: 60
LOS: 1 days | Discharge: HOME | End: 2020-12-21

## 2020-12-21 ENCOUNTER — APPOINTMENT (OUTPATIENT)
Dept: PODIATRY | Facility: CLINIC | Age: 60
End: 2020-12-21
Payer: MEDICAID

## 2020-12-21 DIAGNOSIS — M79.672 PAIN IN LEFT FOOT: ICD-10-CM

## 2020-12-21 DIAGNOSIS — M79.671 PAIN IN RIGHT FOOT: ICD-10-CM

## 2020-12-21 PROCEDURE — 11721 DEBRIDE NAIL 6 OR MORE: CPT

## 2020-12-21 NOTE — PHYSICAL EXAM
[General Appearance - Alert] : alert [General Appearance - In No Acute Distress] : in no acute distress [1+] : left foot dorsalis pedis 1+ [Pes Cavus] : pes cavus deformity [Skin Lesions] : no skin lesions [Vibration Dec.] : diminished vibratory sensation at the level of the toes [Oriented To Time, Place, And Person] : oriented to person, place, and time [Delayed in the Right Toes] : capillary refills normal in right toes [Delayed in the Left Toes] : capillary refills normal in the left toes [FreeTextEntry3] : presence of pedal hair [Foot Ulcer] : no foot ulcer [FreeTextEntry1] : thick, dystrophic, discolored nails with subungual debris x 10\par  [Diminished Throughout Right Foot] : normal sensation with monofilament testing throughout right foot [Diminished Throughout Left Foot] : normal sensation with monofilament testing throughout left foot

## 2020-12-21 NOTE — ASSESSMENT
[FreeTextEntry1] : -Aseptic debridement of all fungal nails.  Patient has pain about the toes secondary to nail pressure and relates improvement with periodic debridement.\par -discussed treatment options for onychomycosis including oral medications. Patients opts for periodic nail debridement\par -return 2 months\par

## 2020-12-22 DIAGNOSIS — M79.671 PAIN IN RIGHT FOOT: ICD-10-CM

## 2020-12-22 DIAGNOSIS — M79.672 PAIN IN LEFT FOOT: ICD-10-CM

## 2020-12-22 DIAGNOSIS — B35.1 TINEA UNGUIUM: ICD-10-CM

## 2020-12-23 ENCOUNTER — EMERGENCY (EMERGENCY)
Facility: HOSPITAL | Age: 60
LOS: 0 days | Discharge: HOME | End: 2020-12-23
Attending: EMERGENCY MEDICINE | Admitting: EMERGENCY MEDICINE
Payer: MEDICAID

## 2020-12-23 VITALS
RESPIRATION RATE: 16 BRPM | HEART RATE: 98 BPM | SYSTOLIC BLOOD PRESSURE: 96 MMHG | DIASTOLIC BLOOD PRESSURE: 55 MMHG | OXYGEN SATURATION: 99 % | TEMPERATURE: 98 F

## 2020-12-23 VITALS — HEART RATE: 90 BPM | SYSTOLIC BLOOD PRESSURE: 113 MMHG | DIASTOLIC BLOOD PRESSURE: 52 MMHG

## 2020-12-23 DIAGNOSIS — E66.9 OBESITY, UNSPECIFIED: ICD-10-CM

## 2020-12-23 DIAGNOSIS — M25.561 PAIN IN RIGHT KNEE: ICD-10-CM
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
## 2020-12-23 DIAGNOSIS — E11.9 TYPE 2 DIABETES MELLITUS WITHOUT COMPLICATIONS: ICD-10-CM
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
## 2020-12-23 DIAGNOSIS — S89.91XA UNSPECIFIED INJURY OF RIGHT LOWER LEG, INITIAL ENCOUNTER: ICD-10-CM
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
## 2020-12-23 LAB
ALBUMIN SERPL ELPH-MCNC: 3.7 G/DL — SIGNIFICANT CHANGE UP (ref 3.5–5.2)
ALP SERPL-CCNC: 104 U/L — SIGNIFICANT CHANGE UP (ref 30–115)
ALT FLD-CCNC: 17 U/L — SIGNIFICANT CHANGE UP (ref 0–41)
ANION GAP SERPL CALC-SCNC: 10 MMOL/L — SIGNIFICANT CHANGE UP (ref 7–14)
APTT BLD: 33.7 SEC — SIGNIFICANT CHANGE UP (ref 27–39.2)
AST SERPL-CCNC: 13 U/L — SIGNIFICANT CHANGE UP (ref 0–41)
BASOPHILS # BLD AUTO: 0.03 K/UL — SIGNIFICANT CHANGE UP (ref 0–0.2)
BASOPHILS NFR BLD AUTO: 0.2 % — SIGNIFICANT CHANGE UP (ref 0–1)
BILIRUB SERPL-MCNC: 0.6 MG/DL — SIGNIFICANT CHANGE UP (ref 0.2–1.2)
BUN SERPL-MCNC: 40 MG/DL — HIGH (ref 10–20)
CALCIUM SERPL-MCNC: 9 MG/DL — SIGNIFICANT CHANGE UP (ref 8.5–10.1)
CHLORIDE SERPL-SCNC: 103 MMOL/L — SIGNIFICANT CHANGE UP (ref 98–110)
CO2 SERPL-SCNC: 22 MMOL/L — SIGNIFICANT CHANGE UP (ref 17–32)
CREAT SERPL-MCNC: 2 MG/DL — HIGH (ref 0.7–1.5)
EOSINOPHIL # BLD AUTO: 0.07 K/UL — SIGNIFICANT CHANGE UP (ref 0–0.7)
EOSINOPHIL NFR BLD AUTO: 0.5 % — SIGNIFICANT CHANGE UP (ref 0–8)
GLUCOSE SERPL-MCNC: 210 MG/DL — HIGH (ref 70–99)
HCT VFR BLD CALC: 44 % — SIGNIFICANT CHANGE UP (ref 42–52)
HGB BLD-MCNC: 14.3 G/DL — SIGNIFICANT CHANGE UP (ref 14–18)
IMM GRANULOCYTES NFR BLD AUTO: 0.4 % — HIGH (ref 0.1–0.3)
INR BLD: 1.09 RATIO — SIGNIFICANT CHANGE UP (ref 0.65–1.3)
LYMPHOCYTES # BLD AUTO: 1.28 K/UL — SIGNIFICANT CHANGE UP (ref 1.2–3.4)
LYMPHOCYTES # BLD AUTO: 9.9 % — LOW (ref 20.5–51.1)
MCHC RBC-ENTMCNC: 28.6 PG — SIGNIFICANT CHANGE UP (ref 27–31)
MCHC RBC-ENTMCNC: 32.5 G/DL — SIGNIFICANT CHANGE UP (ref 32–37)
MCV RBC AUTO: 88 FL — SIGNIFICANT CHANGE UP (ref 80–94)
MONOCYTES # BLD AUTO: 0.86 K/UL — HIGH (ref 0.1–0.6)
MONOCYTES NFR BLD AUTO: 6.7 % — SIGNIFICANT CHANGE UP (ref 1.7–9.3)
NEUTROPHILS # BLD AUTO: 10.61 K/UL — HIGH (ref 1.4–6.5)
NEUTROPHILS NFR BLD AUTO: 82.3 % — HIGH (ref 42.2–75.2)
NRBC # BLD: 0 /100 WBCS — SIGNIFICANT CHANGE UP (ref 0–0)
PLATELET # BLD AUTO: 270 K/UL — SIGNIFICANT CHANGE UP (ref 130–400)
POTASSIUM SERPL-MCNC: 4.6 MMOL/L — SIGNIFICANT CHANGE UP (ref 3.5–5)
POTASSIUM SERPL-SCNC: 4.6 MMOL/L — SIGNIFICANT CHANGE UP (ref 3.5–5)
PROT SERPL-MCNC: 5.9 G/DL — LOW (ref 6–8)
PROTHROM AB SERPL-ACNC: 12.5 SEC — SIGNIFICANT CHANGE UP (ref 9.95–12.87)
RBC # BLD: 5 M/UL — SIGNIFICANT CHANGE UP (ref 4.7–6.1)
RBC # FLD: 12.7 % — SIGNIFICANT CHANGE UP (ref 11.5–14.5)
SODIUM SERPL-SCNC: 135 MMOL/L — SIGNIFICANT CHANGE UP (ref 135–146)
WBC # BLD: 12.9 K/UL — HIGH (ref 4.8–10.8)
WBC # FLD AUTO: 12.9 K/UL — HIGH (ref 4.8–10.8)

## 2020-12-23 PROCEDURE — 73562 X-RAY EXAM OF KNEE 3: CPT | Mod: 26,RT

## 2020-12-23 PROCEDURE — 71260 CT THORAX DX C+: CPT | Mod: 26

## 2020-12-23 PROCEDURE — 71045 X-RAY EXAM CHEST 1 VIEW: CPT | Mod: 26

## 2020-12-23 PROCEDURE — 74177 CT ABD & PELVIS W/CONTRAST: CPT | Mod: 26

## 2020-12-23 PROCEDURE — 99285 EMERGENCY DEPT VISIT HI MDM: CPT

## 2020-12-23 RX ORDER — IBUPROFEN 200 MG
600 TABLET ORAL ONCE
Refills: 0 | Status: COMPLETED | OUTPATIENT
Start: 2020-12-23 | End: 2020-12-23

## 2020-12-23 RX ADMIN — Medication 600 MILLIGRAM(S): at 12:26

## 2020-12-23 NOTE — ED PROVIDER NOTE - PHYSICAL EXAMINATION
VITALS:  I have reviewed the initial vital signs.  GENERAL: Well-developed, well-nourished, in no acute distress.  HEENT: NC/AT. Sclera clear. EOMI, PERRLA. MMM.  NECK: supple w FROM.   CARDIO: RRR, nl S1 and S2. No murmurs, rubs, or gallops. 2+ pedal pulses bilaterally.   PULM: Normal effort. CTA b/l without wheezes, rales, or rhonchi.   MSK: +ttp to lateral right knee. No effusion, crepitus, or deformity. FROM to hip, knee, and ankle w/o swelling/erythema/ecchymosis/deformity/ttp.  SKIN: Warm, dry. No ecchymosis, erythema, or wounds. Capillary refill <2 seconds.  NEURO: A&Ox3. Speech clear. 5/5 strength to lower extremities b/l. Sensation intact and equal throughout.

## 2020-12-23 NOTE — ED ADULT NURSE NOTE - NSIMPLEMENTINTERV_GEN_ALL_ED
Implemented All Fall with Harm Risk Interventions:  [place] to call system. Call bell, personal items and telephone within reach. Instruct patient to call for assistance. Room bathroom lighting operational. Non-slip footwear when patient is off stretcher. Physically safe environment: no spills, clutter or unnecessary equipment. Stretcher in lowest position, wheels locked, appropriate side rails in place. Provide visual cue, wrist band, yellow gown, etc. Monitor gait and stability. Monitor for mental status changes and reorient to person, place, and time. Review medications for side effects contributing to fall risk. Reinforce activity limits and safety measures with patient and family. Provide visual clues: red socks.

## 2020-12-23 NOTE — CHART NOTE - NSCHARTNOTEFT_GEN_A_CORE
SW spoke to pt in the ED.  Pt came to ED due to a fall.  Pt’s PCP Leatha Rivera.  SW provided pt with needed referrals in regards to pt's social issues.  SW provided pt with supportive counseling.   SW remains available if needs were to arise prior discharge.

## 2020-12-23 NOTE — ED PROVIDER NOTE - CARE PROVIDER_API CALL
Leatha Rivera  Internal Medicine  [address] [place] [address]  Phone: [phone]  Fax: [phone]  Follow Up Time: 1-3 Days    Devon Mendenhall  ORTHOPAEDIC SURGERY  [address] [place] [id number]  Phone: [phone]  Fax: [phone]  Follow Up Time: 1-3 Days

## 2020-12-23 NOTE — ED PROVIDER NOTE - PATIENT PORTAL LINK FT
You can access the FollowMyHealth Patient Portal offered by [facility] by registering at the following website: [email or web address]. By joining [facility]’s FollowMyHealth portal, you will also be able to view your health information using other applications (apps) compatible with our system.
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
## 2020-12-23 NOTE — ED PROVIDER NOTE - ATTENDING CONTRIBUTION TO CARE
I personally evaluated the patient. I reviewed the Resident’s or Physician Assistant’s note (as assigned above), and agree with the findings and plan except as documented in my note.    60 male here for evaluation of right knee and leg pain after a fall two days ago. Had mechanical [place] due [place]; went home and noted worsening pain after additional time.    Rx: aspirin    ROS otherwise unremarkable    GEN: male in no distress.   HEENT: non icteric conjunctiva pink. mucosa normal. throat normal. EOMI PERRLA  CHEST: CTA bilateral. normal work of breathing. no accessory muscle use  NECK: normal ROM   CV: pulses intact S1S2  ABD: soft, non rigid, no guarding noted, non distended, no rebound.   EXT: FROM x 4 NVI right knee no effusion. right leg and thigh tender to palpation without hematoma, warmth, increase in girth vs. contralateral side.   NEURO: AAO 3 no focal deficits. Memory speech cognition and coordination grossly intact.   SKIN: no pallor no diaphoresis  PSYCH: normal mood and mentation      Impression: fall    Plan: imaging supportive care and reevaluation

## 2020-12-23 NOTE — ED PROVIDER NOTE - NSFOLLOWUPINSTRUCTIONS_ED_ALL_ED_FT
Knee Pain, Adult    Knee pain in adults is common. It can be caused by many things, including:    Arthritis.  A fluid-filled sac (cyst) or growth in your knee.  An infection in your knee.  An injury that will not heal.  Damage, swelling, or irritation of the tissues that support your knee.    Knee pain is usually not a sign of a serious problem. The pain may go away on its own with time and rest. If it does not, a health care provider may order tests to find the cause of the pain. These may include:    Imaging tests, such as an X-ray, MRI, or ultrasound.  Joint aspiration. In this test, fluid is removed from the knee.  Arthroscopy. In this test, a lighted tube is inserted into knee and an image is projected onto a TV screen.  A biopsy. In this test, a sample of tissue is removed from the body and studied under a microscope.    Follow these instructions at home:  Pay attention to any changes in your symptoms. Take these actions to relieve your pain.    Activity     Rest your knee.  Do not do things that cause pain or make pain worse.  Avoid high-impact activities or exercises, such as running, jumping rope, or doing jumping jacks.  General instructions     Take over-the-counter and prescription medicines only as told by your health care provider.  Raise (elevate) your knee above the level of your heart when you are sitting or lying down.  Sleep with a pillow under your knee.  If directed, apply ice to the knee:    Put ice in a plastic bag.  Place a towel between your skin and the bag.  Leave the ice on for 20 minutes, 2–3 times a day.    Ask your health care provider if you should wear an elastic knee support.  Lose weight if you are overweight. Extra weight can put pressure on your knee.  Do not use any products that contain nicotine or tobacco, such as cigarettes and e-cigarettes. Smoking may slow the healing of any bone and joint problems that you may have. If you need help quitting, ask your health care provider.  Contact a health care provider if:  Your knee pain continues, changes, or gets worse.  You have a fever along with knee pain.  Your knee phoenix or locks up.  Your knee swells, and the swelling becomes worse.  Get help right away if:  Your knee feels warm to the touch.  You cannot move your knee.  You have severe pain in your knee.  You have chest pain.  You have trouble breathing.  Summary  Knee pain in adults is common. It can be caused by many things, including, arthritis, infection, cysts, or injury.  Knee pain is usually not a sign of a serious problem, but if it does not go away, a health care provider may perform tests to know the cause of the pain.  Pay attention to any changes in your symptoms. Relieve your pain with rest, medicines, light activity, and use of ice.  Get help if your pain continues or becomes very severe, or if your knee phoenix or locks up, or if you have chest pain or trouble breathing.  This information is not intended to replace advice given to you by your health care provider. Make sure you discuss any questions you have with your health care provider.

## 2020-12-23 NOTE — ED PROVIDER NOTE - NS ED ROS FT
CONSTITUTIONAL: (-)fevers/chills  MSK: see HPI, (-) back pain  SKIN: (-) rashes, (-) pallor,  (-) wounds, (-) ecchymosis  NEURO: (-) head injury, (-) LOC, (-) weakness, (-) paresthesias, (-) numbness    *all other systems negative except as documented above and in the HPI*

## 2020-12-23 NOTE — ED PROVIDER NOTE - PROGRESS NOTE DETAILS
TANIKA: asked RN to rpt BP due to triage BP of 96/55, called due to repeat BP of 75/37. Pt only complaint is right knee pain. A&Ox3, mentating well, no complaints of headache, dizziness, lightheadedness, chest pain, shortness of breath, palpitations, abd pain, back/flank pain, weakness. Rpt /52. IV placed, fluids hung, ct chest/abd/pelvis added. Will continue to monitor. Dr. Fuchs - patient evaluated emergently by me after being notified of episode of asymptomatic hypotension in the ED.  Pt remains clinically well. No signs of injury or constitutional illness. Pt reports no symptoms. BP repeated by me and 113/57.  POCUS FAST done bedside without acute findings; however patient is morbidly obese and CT imaging will be ordered to validate these BP readings as potentially an error or true patient condition / identify occult hemorrhage. Unlikely given clinical situation and exam. TANIKA: Patient remains asymptomatic in the ED aside from right knee pain which has improved. CT scans negative for acute traumatic injury. Results discussed with patient, printed copies given. Pt placed in knee immobilizer. Patient agreeable and verbalizes understanding of plan of care, f/u, and return precautions.

## 2020-12-23 NOTE — ED PROVIDER NOTE - CLINICAL SUMMARY MEDICAL DECISION MAKING FREE TEXT BOX
evaluated for knee pain, imaging performed which did not reveal acute traumatic injury. patient was splinted.
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
## 2020-12-23 NOTE — ED PROVIDER NOTE - PROVIDER TOKENS
PROVIDER:[TOKEN:[[id number]],FOLLOWUP:[1-3 Days]],PROVIDER:[TOKEN:[[id number]],FOLLOWUP:[1-3 Days]]

## 2021-02-06 ENCOUNTER — RX RENEWAL (OUTPATIENT)
Age: 61
End: 2021-02-06

## 2021-02-06 RX ORDER — ATORVASTATIN CALCIUM 40 MG/1
40 TABLET, FILM COATED ORAL DAILY
Qty: 90 | Refills: 3 | Status: ACTIVE | COMMUNITY
Start: 2018-04-18 | End: 1900-01-01

## 2021-02-11 ENCOUNTER — RX RENEWAL (OUTPATIENT)
Age: 61
End: 2021-02-11

## 2021-02-16 ENCOUNTER — RX RENEWAL (OUTPATIENT)
Age: 61
End: 2021-02-16

## 2021-03-01 ENCOUNTER — APPOINTMENT (OUTPATIENT)
Dept: PODIATRY | Facility: CLINIC | Age: 61
End: 2021-03-01
Payer: MEDICAID

## 2021-03-01 ENCOUNTER — OUTPATIENT (OUTPATIENT)
Dept: OUTPATIENT SERVICES | Facility: HOSPITAL | Age: 61
LOS: 1 days | Discharge: HOME | End: 2021-03-01

## 2021-03-01 DIAGNOSIS — B35.1 TINEA UNGUIUM: ICD-10-CM

## 2021-03-01 PROCEDURE — 11721 DEBRIDE NAIL 6 OR MORE: CPT

## 2021-03-03 NOTE — ASSESSMENT
[FreeTextEntry1] : -Aseptic debridement of all fungal nails.  Patient has pain about the toes secondary to nail pressure and relates improvement with periodic debridement.\par -discussed treatment options for onychomycosis including oral medications. Patients opts for periodic nail debridement\par -return 2 months\par 
FT, , no complications

## 2021-03-04 ENCOUNTER — NON-APPOINTMENT (OUTPATIENT)
Age: 61
End: 2021-03-04

## 2021-03-31 ENCOUNTER — NON-APPOINTMENT (OUTPATIENT)
Age: 61
End: 2021-03-31

## 2021-04-01 ENCOUNTER — APPOINTMENT (OUTPATIENT)
Dept: ENDOCRINOLOGY | Facility: CLINIC | Age: 61
End: 2021-04-01

## 2021-04-07 ENCOUNTER — INPATIENT (INPATIENT)
Facility: HOSPITAL | Age: 61
LOS: 7 days | Discharge: PSYCHIATRIC OR SUBSTANCE USE FACILITY | End: 2021-04-15
Attending: INTERNAL MEDICINE | Admitting: INTERNAL MEDICINE
Payer: MEDICAID

## 2021-04-07 VITALS — DIASTOLIC BLOOD PRESSURE: 54 MMHG | SYSTOLIC BLOOD PRESSURE: 109 MMHG | RESPIRATION RATE: 36 BRPM

## 2021-04-07 LAB
ALBUMIN SERPL ELPH-MCNC: 4.4 G/DL — SIGNIFICANT CHANGE UP (ref 3.5–5.2)
ALBUMIN SERPL ELPH-MCNC: 4.7 G/DL — SIGNIFICANT CHANGE UP (ref 3.5–5.2)
ALP SERPL-CCNC: 161 U/L — HIGH (ref 30–115)
ALP SERPL-CCNC: 168 U/L — HIGH (ref 30–115)
ALT FLD-CCNC: 19 U/L — SIGNIFICANT CHANGE UP (ref 0–41)
ALT FLD-CCNC: 20 U/L — SIGNIFICANT CHANGE UP (ref 0–41)
ANION GAP SERPL CALC-SCNC: 38 MMOL/L — HIGH (ref 7–14)
ANION GAP SERPL CALC-SCNC: 39 MMOL/L — HIGH (ref 7–14)
ANION GAP SERPL CALC-SCNC: 40 MMOL/L — HIGH (ref 7–14)
ANION GAP SERPL CALC-SCNC: 44 MMOL/L — HIGH (ref 7–14)
APTT BLD: 31.1 SEC — SIGNIFICANT CHANGE UP (ref 27–39.2)
AST SERPL-CCNC: 11 U/L — SIGNIFICANT CHANGE UP (ref 0–41)
AST SERPL-CCNC: 13 U/L — SIGNIFICANT CHANGE UP (ref 0–41)
B-OH-BUTYR SERPL-SCNC: >9 MMOL/L — HIGH
BASE EXCESS BLDV CALC-SCNC: -17 MMOL/L — LOW (ref -2–2)
BASOPHILS # BLD AUTO: 0.03 K/UL — SIGNIFICANT CHANGE UP (ref 0–0.2)
BASOPHILS NFR BLD AUTO: 0.2 % — SIGNIFICANT CHANGE UP (ref 0–1)
BILIRUB SERPL-MCNC: 0.6 MG/DL — SIGNIFICANT CHANGE UP (ref 0.2–1.2)
BILIRUB SERPL-MCNC: 0.7 MG/DL — SIGNIFICANT CHANGE UP (ref 0.2–1.2)
BLD GP AB SCN SERPL QL: SIGNIFICANT CHANGE UP
BUN SERPL-MCNC: 38 MG/DL — HIGH (ref 10–20)
BUN SERPL-MCNC: 45 MG/DL — HIGH (ref 10–20)
BUN SERPL-MCNC: 50 MG/DL — HIGH (ref 10–20)
BUN SERPL-MCNC: 50 MG/DL — HIGH (ref 10–20)
CA-I SERPL-SCNC: 1.26 MMOL/L — SIGNIFICANT CHANGE UP (ref 1.12–1.3)
CALCIUM SERPL-MCNC: 10 MG/DL — SIGNIFICANT CHANGE UP (ref 8.5–10.1)
CALCIUM SERPL-MCNC: 10.4 MG/DL — HIGH (ref 8.5–10.1)
CALCIUM SERPL-MCNC: 10.7 MG/DL — HIGH (ref 8.5–10.1)
CALCIUM SERPL-MCNC: 9.6 MG/DL — SIGNIFICANT CHANGE UP (ref 8.5–10.1)
CHLORIDE SERPL-SCNC: 83 MMOL/L — LOW (ref 98–110)
CHLORIDE SERPL-SCNC: 86 MMOL/L — LOW (ref 98–110)
CHLORIDE SERPL-SCNC: 89 MMOL/L — LOW (ref 98–110)
CHLORIDE SERPL-SCNC: 90 MMOL/L — LOW (ref 98–110)
CK MB CFR SERPL CALC: 13.5 NG/ML — HIGH (ref 0.6–6.3)
CK SERPL-CCNC: 330 U/L — HIGH (ref 0–225)
CO2 SERPL-SCNC: 10 MMOL/L — LOW (ref 17–32)
CO2 SERPL-SCNC: 3 MMOL/L — CRITICAL LOW (ref 17–32)
CO2 SERPL-SCNC: 7 MMOL/L — CRITICAL LOW (ref 17–32)
CO2 SERPL-SCNC: 8 MMOL/L — CRITICAL LOW (ref 17–32)
CREAT SERPL-MCNC: 2.2 MG/DL — HIGH (ref 0.7–1.5)
CREAT SERPL-MCNC: 2.7 MG/DL — HIGH (ref 0.7–1.5)
CREAT SERPL-MCNC: 2.8 MG/DL — HIGH (ref 0.7–1.5)
CREAT SERPL-MCNC: 2.8 MG/DL — HIGH (ref 0.7–1.5)
EOSINOPHIL # BLD AUTO: 0.01 K/UL — SIGNIFICANT CHANGE UP (ref 0–0.7)
EOSINOPHIL NFR BLD AUTO: 0.1 % — SIGNIFICANT CHANGE UP (ref 0–8)
GAS PNL BLDV: 130 MMOL/L — LOW (ref 136–145)
GAS PNL BLDV: SIGNIFICANT CHANGE UP
GLUCOSE BLDC GLUCOMTR-MCNC: 324 MG/DL — HIGH (ref 70–99)
GLUCOSE BLDC GLUCOMTR-MCNC: 372 MG/DL — HIGH (ref 70–99)
GLUCOSE BLDC GLUCOMTR-MCNC: 485 MG/DL — CRITICAL HIGH (ref 70–99)
GLUCOSE BLDC GLUCOMTR-MCNC: >600 MG/DL — CRITICAL HIGH (ref 70–99)
GLUCOSE SERPL-MCNC: 292 MG/DL — HIGH (ref 70–99)
GLUCOSE SERPL-MCNC: 431 MG/DL — HIGH (ref 70–99)
GLUCOSE SERPL-MCNC: 687 MG/DL — CRITICAL HIGH (ref 70–99)
GLUCOSE SERPL-MCNC: 774 MG/DL — CRITICAL HIGH (ref 70–99)
HCO3 BLDV-SCNC: 10 MMOL/L — LOW (ref 22–29)
HCT VFR BLD CALC: 50 % — SIGNIFICANT CHANGE UP (ref 42–52)
HCT VFR BLDA CALC: 51.2 % — HIGH (ref 34–44)
HGB BLD CALC-MCNC: 16.7 G/DL — SIGNIFICANT CHANGE UP (ref 14–18)
HGB BLD-MCNC: 16.4 G/DL — SIGNIFICANT CHANGE UP (ref 14–18)
IMM GRANULOCYTES NFR BLD AUTO: 0.7 % — HIGH (ref 0.1–0.3)
INR BLD: 0.97 RATIO — SIGNIFICANT CHANGE UP (ref 0.65–1.3)
LACTATE BLDV-MCNC: 2.3 MMOL/L — HIGH (ref 0.5–1.6)
LYMPHOCYTES # BLD AUTO: 0.98 K/UL — LOW (ref 1.2–3.4)
LYMPHOCYTES # BLD AUTO: 5.1 % — LOW (ref 20.5–51.1)
MAGNESIUM SERPL-MCNC: 2.7 MG/DL — HIGH (ref 1.8–2.4)
MCHC RBC-ENTMCNC: 27.8 PG — SIGNIFICANT CHANGE UP (ref 27–31)
MCHC RBC-ENTMCNC: 32.8 G/DL — SIGNIFICANT CHANGE UP (ref 32–37)
MCV RBC AUTO: 84.7 FL — SIGNIFICANT CHANGE UP (ref 80–94)
MONOCYTES # BLD AUTO: 0.95 K/UL — HIGH (ref 0.1–0.6)
MONOCYTES NFR BLD AUTO: 4.9 % — SIGNIFICANT CHANGE UP (ref 1.7–9.3)
NEUTROPHILS # BLD AUTO: 17.15 K/UL — HIGH (ref 1.4–6.5)
NEUTROPHILS NFR BLD AUTO: 89 % — HIGH (ref 42.2–75.2)
NRBC # BLD: 0 /100 WBCS — SIGNIFICANT CHANGE UP (ref 0–0)
NT-PROBNP SERPL-SCNC: 217 PG/ML — SIGNIFICANT CHANGE UP (ref 0–300)
PCO2 BLDV: 27 MMHG — LOW (ref 42–55)
PH BLDV: 7.17 — LOW (ref 7.26–7.43)
PLATELET # BLD AUTO: 482 K/UL — HIGH (ref 130–400)
PO2 BLDV: 20 MMHG — SIGNIFICANT CHANGE UP (ref 20–40)
POTASSIUM BLDV-SCNC: 5.4 MMOL/L — SIGNIFICANT CHANGE UP (ref 3.3–5.6)
POTASSIUM SERPL-MCNC: 3.3 MMOL/L — LOW (ref 3.5–5)
POTASSIUM SERPL-MCNC: 4.1 MMOL/L — SIGNIFICANT CHANGE UP (ref 3.5–5)
POTASSIUM SERPL-MCNC: 4.8 MMOL/L — SIGNIFICANT CHANGE UP (ref 3.5–5)
POTASSIUM SERPL-MCNC: 5.6 MMOL/L — HIGH (ref 3.5–5)
POTASSIUM SERPL-SCNC: 3.3 MMOL/L — LOW (ref 3.5–5)
POTASSIUM SERPL-SCNC: 4.1 MMOL/L — SIGNIFICANT CHANGE UP (ref 3.5–5)
POTASSIUM SERPL-SCNC: 4.8 MMOL/L — SIGNIFICANT CHANGE UP (ref 3.5–5)
POTASSIUM SERPL-SCNC: 5.6 MMOL/L — HIGH (ref 3.5–5)
PROT SERPL-MCNC: 7.6 G/DL — SIGNIFICANT CHANGE UP (ref 6–8)
PROT SERPL-MCNC: 8.3 G/DL — HIGH (ref 6–8)
PROTHROM AB SERPL-ACNC: 11.2 SEC — SIGNIFICANT CHANGE UP (ref 9.95–12.87)
RBC # BLD: 5.9 M/UL — SIGNIFICANT CHANGE UP (ref 4.7–6.1)
RBC # FLD: 13.4 % — SIGNIFICANT CHANGE UP (ref 11.5–14.5)
SAO2 % BLDV: 26 % — SIGNIFICANT CHANGE UP
SARS-COV-2 RNA SPEC QL NAA+PROBE: SIGNIFICANT CHANGE UP
SODIUM SERPL-SCNC: 129 MMOL/L — LOW (ref 135–146)
SODIUM SERPL-SCNC: 133 MMOL/L — LOW (ref 135–146)
SODIUM SERPL-SCNC: 135 MMOL/L — SIGNIFICANT CHANGE UP (ref 135–146)
SODIUM SERPL-SCNC: 140 MMOL/L — SIGNIFICANT CHANGE UP (ref 135–146)
TROPONIN T SERPL-MCNC: 0.01 NG/ML — SIGNIFICANT CHANGE UP
TROPONIN T SERPL-MCNC: <0.01 NG/ML — SIGNIFICANT CHANGE UP
WBC # BLD: 19.25 K/UL — HIGH (ref 4.8–10.8)
WBC # FLD AUTO: 19.25 K/UL — HIGH (ref 4.8–10.8)

## 2021-04-07 PROCEDURE — 71045 X-RAY EXAM CHEST 1 VIEW: CPT | Mod: 26

## 2021-04-07 PROCEDURE — 99222 1ST HOSP IP/OBS MODERATE 55: CPT | Mod: 25

## 2021-04-07 PROCEDURE — 99291 CRITICAL CARE FIRST HOUR: CPT

## 2021-04-07 PROCEDURE — 99223 1ST HOSP IP/OBS HIGH 75: CPT

## 2021-04-07 PROCEDURE — 93970 EXTREMITY STUDY: CPT | Mod: 26

## 2021-04-07 PROCEDURE — 74177 CT ABD & PELVIS W/CONTRAST: CPT | Mod: 26

## 2021-04-07 PROCEDURE — 10060 I&D ABSCESS SIMPLE/SINGLE: CPT

## 2021-04-07 RX ORDER — CHLORHEXIDINE GLUCONATE 213 G/1000ML
1 SOLUTION TOPICAL
Refills: 0 | Status: DISCONTINUED | OUTPATIENT
Start: 2021-04-07 | End: 2021-04-08

## 2021-04-07 RX ORDER — MEROPENEM 1 G/30ML
500 INJECTION INTRAVENOUS EVERY 12 HOURS
Refills: 0 | Status: DISCONTINUED | OUTPATIENT
Start: 2021-04-07 | End: 2021-04-08

## 2021-04-07 RX ORDER — SODIUM CHLORIDE 9 MG/ML
1000 INJECTION, SOLUTION INTRAVENOUS ONCE
Refills: 0 | Status: COMPLETED | OUTPATIENT
Start: 2021-04-07 | End: 2021-04-07

## 2021-04-07 RX ORDER — SODIUM BICARBONATE 1 MEQ/ML
8.4 SYRINGE (ML) INTRAVENOUS ONCE
Refills: 0 | Status: DISCONTINUED | OUTPATIENT
Start: 2021-04-07 | End: 2021-04-07

## 2021-04-07 RX ORDER — VANCOMYCIN HCL 1 G
1500 VIAL (EA) INTRAVENOUS EVERY 12 HOURS
Refills: 0 | Status: DISCONTINUED | OUTPATIENT
Start: 2021-04-07 | End: 2021-04-07

## 2021-04-07 RX ORDER — SODIUM BICARBONATE 1 MEQ/ML
0.07 SYRINGE (ML) INTRAVENOUS
Qty: 150 | Refills: 0 | Status: DISCONTINUED | OUTPATIENT
Start: 2021-04-07 | End: 2021-04-07

## 2021-04-07 RX ORDER — SODIUM BICARBONATE 1 MEQ/ML
50 SYRINGE (ML) INTRAVENOUS ONCE
Refills: 0 | Status: DISCONTINUED | OUTPATIENT
Start: 2021-04-07 | End: 2021-04-07

## 2021-04-07 RX ORDER — CEFEPIME 1 G/1
2000 INJECTION, POWDER, FOR SOLUTION INTRAMUSCULAR; INTRAVENOUS ONCE
Refills: 0 | Status: COMPLETED | OUTPATIENT
Start: 2021-04-07 | End: 2021-04-07

## 2021-04-07 RX ORDER — VANCOMYCIN HCL 1 G
1500 VIAL (EA) INTRAVENOUS EVERY 24 HOURS
Refills: 0 | Status: DISCONTINUED | OUTPATIENT
Start: 2021-04-07 | End: 2021-04-08

## 2021-04-07 RX ORDER — HEPARIN SODIUM 5000 [USP'U]/ML
5000 INJECTION INTRAVENOUS; SUBCUTANEOUS EVERY 12 HOURS
Refills: 0 | Status: DISCONTINUED | OUTPATIENT
Start: 2021-04-07 | End: 2021-04-08

## 2021-04-07 RX ORDER — ATORVASTATIN CALCIUM 80 MG/1
40 TABLET, FILM COATED ORAL AT BEDTIME
Refills: 0 | Status: DISCONTINUED | OUTPATIENT
Start: 2021-04-07 | End: 2021-04-08

## 2021-04-07 RX ORDER — POTASSIUM CHLORIDE 20 MEQ
20 PACKET (EA) ORAL ONCE
Refills: 0 | Status: COMPLETED | OUTPATIENT
Start: 2021-04-07 | End: 2021-04-07

## 2021-04-07 RX ORDER — VANCOMYCIN HCL 1 G
1000 VIAL (EA) INTRAVENOUS ONCE
Refills: 0 | Status: COMPLETED | OUTPATIENT
Start: 2021-04-07 | End: 2021-04-07

## 2021-04-07 RX ORDER — SODIUM BICARBONATE 1 MEQ/ML
100 SYRINGE (ML) INTRAVENOUS ONCE
Refills: 0 | Status: COMPLETED | OUTPATIENT
Start: 2021-04-07 | End: 2021-04-07

## 2021-04-07 RX ORDER — SODIUM BICARBONATE 1 MEQ/ML
0.5 SYRINGE (ML) INTRAVENOUS
Qty: 150 | Refills: 0 | Status: DISCONTINUED | OUTPATIENT
Start: 2021-04-07 | End: 2021-04-07

## 2021-04-07 RX ORDER — INSULIN HUMAN 100 [IU]/ML
0.1 INJECTION, SOLUTION SUBCUTANEOUS
Qty: 100 | Refills: 0 | Status: DISCONTINUED | OUTPATIENT
Start: 2021-04-07 | End: 2021-04-07

## 2021-04-07 RX ORDER — INSULIN HUMAN 100 [IU]/ML
13 INJECTION, SOLUTION SUBCUTANEOUS
Qty: 100 | Refills: 0 | Status: DISCONTINUED | OUTPATIENT
Start: 2021-04-07 | End: 2021-04-08

## 2021-04-07 RX ORDER — SODIUM CHLORIDE 9 MG/ML
1000 INJECTION INTRAMUSCULAR; INTRAVENOUS; SUBCUTANEOUS ONCE
Refills: 0 | Status: COMPLETED | OUTPATIENT
Start: 2021-04-07 | End: 2021-04-07

## 2021-04-07 RX ORDER — SODIUM CHLORIDE 9 MG/ML
1000 INJECTION, SOLUTION INTRAVENOUS
Refills: 0 | Status: DISCONTINUED | OUTPATIENT
Start: 2021-04-07 | End: 2021-04-08

## 2021-04-07 RX ADMIN — SODIUM CHLORIDE 1000 MILLILITER(S): 9 INJECTION, SOLUTION INTRAVENOUS at 16:00

## 2021-04-07 RX ADMIN — MEROPENEM 100 MILLIGRAM(S): 1 INJECTION INTRAVENOUS at 22:18

## 2021-04-07 RX ADMIN — CEFEPIME 100 MILLIGRAM(S): 1 INJECTION, POWDER, FOR SOLUTION INTRAMUSCULAR; INTRAVENOUS at 16:47

## 2021-04-07 RX ADMIN — INSULIN HUMAN 0.1 UNIT(S)/HR: 100 INJECTION, SOLUTION SUBCUTANEOUS at 14:47

## 2021-04-07 RX ADMIN — Medication 1 MILLIGRAM(S): at 16:51

## 2021-04-07 RX ADMIN — Medication 100 MILLIGRAM(S): at 21:00

## 2021-04-07 RX ADMIN — HEPARIN SODIUM 5000 UNIT(S): 5000 INJECTION INTRAVENOUS; SUBCUTANEOUS at 18:05

## 2021-04-07 RX ADMIN — SODIUM CHLORIDE 1000 MILLILITER(S): 9 INJECTION, SOLUTION INTRAVENOUS at 13:46

## 2021-04-07 RX ADMIN — Medication 50 MILLIEQUIVALENT(S): at 22:41

## 2021-04-07 RX ADMIN — Medication 250 MILLIGRAM(S): at 18:00

## 2021-04-07 RX ADMIN — Medication 100 MILLIEQUIVALENT(S): at 18:37

## 2021-04-07 RX ADMIN — SODIUM CHLORIDE 1000 MILLILITER(S): 9 INJECTION INTRAMUSCULAR; INTRAVENOUS; SUBCUTANEOUS at 22:18

## 2021-04-07 RX ADMIN — INSULIN HUMAN 15 UNIT(S)/HR: 100 INJECTION, SOLUTION SUBCUTANEOUS at 16:53

## 2021-04-07 RX ADMIN — SODIUM CHLORIDE 250 MILLILITER(S): 9 INJECTION, SOLUTION INTRAVENOUS at 20:04

## 2021-04-07 RX ADMIN — Medication 100 MILLIGRAM(S): at 17:49

## 2021-04-07 RX ADMIN — SODIUM CHLORIDE 1000 MILLILITER(S): 9 INJECTION, SOLUTION INTRAVENOUS at 14:47

## 2021-04-07 NOTE — ED PROVIDER NOTE - NS ED ROS FT
CONST: No fever, chills or bodyaches. (+) weakness  EYES: No pain, redness, drainage or visual changes.  ENT: No ear pain or discharge, nasal discharge or congestion. No sore throat  CARD: No chest pain, palpitations  RESP: No SOB, cough, hemoptysis. No hx of asthma or COPD  GI: No abdominal pain, N/V/D  : No urinary symptoms  MS: No joint pain, back pain or extremity pain/injury  SKIN: No rashes  NEURO: No headache, dizziness, paresthesias or LOC

## 2021-04-07 NOTE — H&P ADULT - ATTENDING COMMENTS
pt with severe DKA and scrotal hydroadenitis  seen by [facility] OR today  close anion gap  needs social plan

## 2021-04-07 NOTE — ED ADULT NURSE NOTE - OBJECTIVE STATEMENT
pt BIBA b/c he fell at home and is sob. pt FS elevated, pt kussmaul breathing and unable to obtain O2 sat due to cold fingertips.

## 2021-04-07 NOTE — ED PROVIDER NOTE - CLINICAL SUMMARY MEDICAL DECISION MAKING FREE TEXT BOX
60 yr old m that presents with hyperglycemia, concern for groin cellulitis/infection. labs, ekg, imaging obtained. pt placed on insulin drip. in the ed pt evaluated by urology and surgery. pt admitted to MICU for further evaluation.

## 2021-04-07 NOTE — CONSULT NOTE ADULT - ASSESSMENT
60M w/PMHx of uncontrolled DM2, diabetic retinopathy and neuropathy, HTN, morbid obesity, CKD 3, who called EMS today after falling at home yesterday and was unable to get up.  60M w/PMHx of uncontrolled DM2, diabetic retinopathy and neuropathy, HTN, morbid obesity, CKD 3, who called EMS today after falling at home yesterday and was unable to get up. Patient's clinical presentation and laboratory values consistent with DKA. Surgery consulted for erythema and induration of the groin.     Plan:   -CT imaging demonstrates "edema/inflammatory changes in the soft tissues of the anterior pelvis involving the left groin and pubic region. Small 2.1 cm fluid pocket along the skin surface. No soft tissue gas identified"; imaging not consistent with necrotizing soft tissue infection   -Exam consistent with superficial cellulitis, possible infected hidradenitis   -Continue IV antibiotics for cellulitic infection   -DKA management, MICU monitoring   -Continue IVF resuscitation   -Plan discussed with Dr. Hopkins

## 2021-04-07 NOTE — ED PROVIDER NOTE - PHYSICAL EXAMINATION
Physical Exam    Vital Signs: I have reviewed the initial vital signs.  Constitutional: well-nourished, appears stated age, no acute distress  Eyes: Conjunctiva pink, Sclera clear, PERRLA, EOMI without pain.   Cardiovascular: S1 and S2, regular rate, regular rhythm, well-perfused extremities, radial pulses equal and 2+ b/l.   Respiratory: unlabored respiratory effort, clear to auscultation bilaterally no wheezing, rales and rhonchi. pt is speaking full sentences. no accessory muscle use. pt appears to have kussumal respirations. pt is ripping the nonbreather off his face.   Gastrointestinal: soft, non-tender, nondistended abdomen, no pulsatile mass, normal bowl sounds, no rebound, no guarding, negative psoas, negative obturator, negative murphys. no organomegaly.   Genitourinary: pt has has beefy red skin changes above the scrotum extending to the inner groin b/l with malodorous yellow drainage in that region and evidence of fungal infection with white creamy wet patches to the inner groin region.   Musculoskeletal: supple neck, no lower extremity edema, no calf tenderness, no midline tenderness, no palpable spinal step offs  Integumentary: warm, dry, no rash  Neurologic: awake, alert, cranial nerves II-XII grossly intact, extremities’ motor and sensory functions grossly intact.  Psychiatric: appropriate mood, appropriate affect

## 2021-04-07 NOTE — CONSULT NOTE ADULT - ASSESSMENT
61 yo M with PMH with DM2, Diabetic peripheral neuropathy, Diabetic retinopathy, HTN, Morbid Obesity,  CKD stage3, ATN, BIBEMS for s/p fall on 4/6- Urology consulted for possible sadie's gangrene. Patient's  Cr 2.2 (baseline 1.9-2.0), WBC 19. Sadie's vs. Supravital  Hydradenitis     Plan:  - F/U CT scan for further evaluation   - F/u Suprapubic Abscess Cx - sent   - F/u Surgery recommendations  - IV abx per ID   - F/u Ua and UCx   -  Case d/w Dr. Sotelo 59 yo M with PMH with DM2, Diabetic peripheral neuropathy, Diabetic retinopathy, HTN, Morbid Obesity,  CKD stage3, ATN, BIBEMS for s/p fall on 4/6- Urology consulted for possible sadie's gangrene. Patient's  Cr 2.2 (baseline 1.9-2.0), WBC 19. Sadie's vs. Supravital  Hydradenitis    Plan:  - F/U CT scan for further evaluation   - F/u Testicular US   - F/u Suprapubic Abscess Cx - sent   - F/u Surgery recommendations  - IV abx per ID   - F/u Ua and UCx   -  Case d/w Dr. Sotelo 61 yo M with PMH with DM2, Diabetic peripheral neuropathy, Diabetic retinopathy, HTN, Morbid Obesity,  CKD stage3, ATN, BIBEMS for s/p fall on 4/6- Urology consulted for possible sadie's gangrene. Patient's  Cr 2.2 (baseline 1.9-2.0), WBC 19. Sadie's vs. Supravital  Hydradenitis    Plan:  - F/U CT scan for further evaluation   - F/u Testicular US   - F/u Suprapubic Abscess Cx - sent   - F/u Surgery recommendations  - aggressive balanitis care   - IV abx per ID   - F/u Ua and UCx   -  Case d/w Dr. Sotelo 59 yo M with PMH with DM2, Diabetic peripheral neuropathy, Diabetic retinopathy, HTN, Morbid Obesity,  CKD stage3, ATN, BIBEMS for s/p fall on 4/6- Urology consulted for possible sadie's gangrene. Patient's  Cr 2.2 (baseline 1.9-2.0), WBC 19. Sadie's vs. Hydradenitis suppurativa     Plan:  - F/U CT scan for further evaluation   - F/u Testicular US   - F/u Suprapubic Abscess Cx - sent   - F/u Surgery recommendations  - aggressive balanitis care   - IV abx per ID   - F/u Ua and UCx   -  Case d/w Dr. Sotelo 61 yo M with PMH with DM2, Diabetic peripheral neuropathy, Diabetic retinopathy, HTN, Morbid Obesity,  CKD stage3, ATN, BIBEMS for s/p fall on 4/6- Urology consulted for possible sadie's gangrene. Patient's  Cr 2.2 (baseline 1.9-2.0), WBC 19. Sadie's vs. Hydradenitis suppurativa     Plan:  - F/U CT scan for further evaluation   - F/u Testicular US   - F/u Suprapubic Abscess Cx - sent   - F/u Surgery recommendations  - aggressive balanitis care   - IV abx per ID   - F/u Ua and UCx   -  Case d/w Dr. Sotelo    Addendum 20:08 at 4/7/21     CT reviewed with radiology, Dr. Hopkins and Dr. Sotelo, no evidence of sadie's gangrene, CT scan consistent with superficial cellulitis. Continue IV abx per ID.     < from: CT Abdomen and Pelvis w/ IV Cont (04.07.21 @ 16:05) >  EXAM:  CT ABDOMEN AND PELVIS IC            PROCEDURE DATE:  04/07/2021            INTERPRETATION:  CLINICAL STATEMENT: Beefy red meatus with foul-smelling discharge. Clinical concern for Sadie's gangrene.    TECHNIQUE: Contiguous axial CT imageswere obtained from the lower chest to the pubic symphysis after the administration of 100 cc Omnipaque 350 IV contrast.  Oral contrast was not administered.  Reformatted images in the coronal and sagittal planes were acquired. Streak artifact limits evaluation due to patient arm positioning.    COMPARISON CT: CT abdomen and pelvis 12/23/2020.    FINDINGS:    LOWER CHEST: Unremarkable.    HEPATOBILIARY: Status post cholecystectomy.    SPLEEN: Unremarkable.    PANCREAS: Unremarkable.    ADRENAL GLANDS: Unremarkable.    KIDNEYS: No hydronephrosis.    ABDOMINOPELVIC NODES: No enlarged lymph nodes.    PELVIC ORGANS: Unremarkable bladder.    PERITONEUM/MESENTERY/BOWEL: No evidence of bowel obstruction ascites or free air.    BONES/SOFT TISSUES: Edema/inflammatory changes in the soft tissues of the anterior pelvis involving the left groin and pubic region. Small 2.1 cm fluid pocket along the skin surface (4/444).. No soft tissue gas identified. Degenerative changes of the spine.    OTHER: Atherosclerosis of the aorta and its branches.    IMPRESSION:    Edema/inflammatory changes in the soft tissues of the anterior pelvis involving the left groin and pubic region. Small 2.1 cm fluid pocket along the skin surface. No soft tissue gas identified.            MALINA TUBBS M.D., RESIDENT RADIOLOGIST  This document has been electronically signed.  DARYL HEATH MD; Attending Radiologist  This document has been electronically signed. Apr 7 2021  4:58PM    < end of copied text >   61 yo M with PMH with DM2, Diabetic peripheral neuropathy, Diabetic retinopathy, HTN, Morbid Obesity,  CKD stage3, ATN, BIBEMS for s/p fall on 4/6- Urology consulted for possible sadie's gangrene. Patient's  Cr 2.2 (baseline 1.9-2.0), WBC 19. Sadie's vs. Hydradenitis suppurativa     Plan:  - F/U CT scan for further evaluation   - F/u Testicular US   - F/u Suprapubic Abscess Cx - sent   - F/u Surgery recommendations  - aggressive balanitis care   - IV abx per ID   - F/u Ua and UCx   -  Case d/w Dr. Sotelo    Addendum 20:08 at 4/7/21     CT reviewed with radiology, Dr. Hopkins and Dr. Sotelo, no evidence of sadie's gangrene, CT scan consistent with superficial cellulitis with hydradenitis. Continue IV abx per ID.     Addendum at 21:30 on 4/721     Patient seen and examined with Dr. Vo, removed two necrotics skin tissue on suprapubic region of patient's with purulence removed.   Surgery to reassess further intervention - consider I&D with packing       < from: CT Abdomen and Pelvis w/ IV Cont (04.07.21 @ 16:05) >  EXAM:  CT ABDOMEN AND PELVIS IC            PROCEDURE DATE:  04/07/2021            INTERPRETATION:  CLINICAL STATEMENT: Beefy red meatus with foul-smelling discharge. Clinical concern for Sadie's gangrene.    TECHNIQUE: Contiguous axial CT imageswere obtained from the lower chest to the pubic symphysis after the administration of 100 cc Omnipaque 350 IV contrast.  Oral contrast was not administered.  Reformatted images in the coronal and sagittal planes were acquired. Streak artifact limits evaluation due to patient arm positioning.    COMPARISON CT: CT abdomen and pelvis 12/23/2020.    FINDINGS:    LOWER CHEST: Unremarkable.    HEPATOBILIARY: Status post cholecystectomy.    SPLEEN: Unremarkable.    PANCREAS: Unremarkable.    ADRENAL GLANDS: Unremarkable.    KIDNEYS: No hydronephrosis.    ABDOMINOPELVIC NODES: No enlarged lymph nodes.    PELVIC ORGANS: Unremarkable bladder.    PERITONEUM/MESENTERY/BOWEL: No evidence of bowel obstruction ascites or free air.    BONES/SOFT TISSUES: Edema/inflammatory changes in the soft tissues of the anterior pelvis involving the left groin and pubic region. Small 2.1 cm fluid pocket along the skin surface (4/444).. No soft tissue gas identified. Degenerative changes of the spine.    OTHER: Atherosclerosis of the aorta and its branches.    IMPRESSION:    Edema/inflammatory changes in the soft tissues of the anterior pelvis involving the left groin and pubic region. Small 2.1 cm fluid pocket along the skin surface. No soft tissue gas identified.            MALINA TUBBS M.D., RESIDENT RADIOLOGIST  This document has been electronically signed.  DARYL HEATH MD; Attending Radiologist  This document has been electronically signed. Apr 7 2021  4:58PM    < end of copied text >   61 yo M with PMH with DM2, Diabetic peripheral neuropathy, Diabetic retinopathy, HTN, Morbid Obesity,  CKD stage3, ATN, BIBEMS for s/p fall on 4/6- Urology consulted for possible sadie's gangrene. Patient's  Cr 2.2 (baseline 1.9-2.0), WBC 19. Sadie's vs. Hydradenitis suppurativa     Plan:  - F/U CT scan for further evaluation   - F/u Testicular US   - F/u Suprapubic Abscess Cx - sent   - F/u Surgery recommendations  - aggressive balanitis care   - IV abx per ID   - F/u Ua and UCx   -  Case d/w Dr. Sotelo    Addendum 20:08 at 4/7/21     CT reviewed with radiology, Dr. Hopkins and Dr. Sotelo, no evidence of sadie's gangrene, CT scan consistent with superficial cellulitis with hydradenitis. Continue IV abx per ID.     Addendum at 21:30 on 4/721     Patient seen and examined with Dr. Vo, removed two necrotics skin tissue on suprapubic region of patient's with purulence removed.   Surgery to reassess further intervention - consider I&D with packing       < from: CT Abdomen and Pelvis w/ IV Cont (04.07.21 @ 16:05) >  EXAM:  CT ABDOMEN AND PELVIS IC            PROCEDURE DATE:  04/07/2021            INTERPRETATION:  CLINICAL STATEMENT: Beefy red meatus with foul-smelling discharge. Clinical concern for Sadie's gangrene.    TECHNIQUE: Contiguous axial CT imageswere obtained from the lower chest to the pubic symphysis after the administration of 100 cc Omnipaque 350 IV contrast.  Oral contrast was not administered.  Reformatted images in the coronal and sagittal planes were acquired. Streak artifact limits evaluation due to patient arm positioning.    COMPARISON CT: CT abdomen and pelvis 12/23/2020.    FINDINGS:    LOWER CHEST: Unremarkable.    HEPATOBILIARY: Status post cholecystectomy.    SPLEEN: Unremarkable.    PANCREAS: Unremarkable.    ADRENAL GLANDS: Unremarkable.    KIDNEYS: No hydronephrosis.    ABDOMINOPELVIC NODES: No enlarged lymph nodes.    PELVIC ORGANS: Unremarkable bladder.    PERITONEUM/MESENTERY/BOWEL: No evidence of bowel obstruction ascites or free air.    BONES/SOFT TISSUES: Edema/inflammatory changes in the soft tissues of the anterior pelvis involving the left groin and pubic region. Small 2.1 cm fluid pocket along the skin surface (4/444).. No soft tissue gas identified. Degenerative changes of the spine.    OTHER: Atherosclerosis of the aorta and its branches.    IMPRESSION:    Edema/inflammatory changes in the soft tissues of the anterior pelvis involving the left groin and pubic region. Small 2.1 cm fluid pocket along the skin surface. No soft tissue gas identified.            MALINA TUBBS M.D., RESIDENT RADIOLOGIST  This document has been electronically signed.  DARYL HEATH MD; Attending Radiologist  This document has been electronically signed. Apr 7 2021  4:58PM    < end of copied text >    I reviewed the CAT scan with the urology staff and personally examined the patient about 8 o’clock at night several hours after the initial review. I found two areas of skin that were black and that he actually opened up using a small scissors. I probe the area the one that was more medially did not have any collection and he said the one that was more lateral had a collection that I was able to train and got out about 5 mL of past. I spoke with both the medical team and with the surgical service and recommended are careful watch as if the block area spread he may need much more aggressive treatment. Otherwise if they agree with my findings then they should consider our packing perhaps a wound irrigation and to continue the antibiotics he is ready on

## 2021-04-07 NOTE — H&P ADULT - ASSESSMENT
Assessment and Plan:    DKA  Superficial cellulitis vs Hydradenitis left groin and pubic area      # DKA  # Metabolic acidosis  PH 7.17 > 7.08  HCO3 8 > 5  cw insulin drip   with Potassium 40 meq  100 meq bicarb push then continue with the bicarb drip  fu stat bmp  repeat ABGs around 8 pm  NPO for now  FS q2 for now  fu lytes and replete      # Superficial cellulitis vs Hydradenitis left groin and pubic area  fu blood cultures  fu abscess cultures  fu mrsa  cw vanco and clinda for now  fu ID eval        DVT PPx: heparin  GI ppx: not indicated  Diet: npo for now  Activity: bedrest             Assessment and Plan:    DKA  Superficial cellulitis vs Hydradenitis left groin and pubic area      # DKA  # Metabolic acidosis  PH 7.17 > 7.08  HCO3 8 > 5  cw insulin drip   with Potassium 40 meq  100 meq bicarb push then continue with the bicarb drip  fu stat bmp  repeat ABGs around 8 pm  NPO for now  FS q2 for now  fu lytes and replete      # Superficial cellulitis vs Hydradenitis left groin and pubic area  fu blood cultures  fu abscess cultures  fu mrsa  cw vanco and clinda for now  fu ID eval      # Hyponatremia  Na corrected for Glucose ia 141  likely secondary to hyperglycemia  fu repeat bmp, monitor for now    # HTN  holding home meds for now      # CKD  avoid nephrotoxins      # Obesity          DVT PPx: heparin  GI ppx: not indicated  Diet: npo for now  Activity: bedrest             Assessment and Plan:    DKA  Superficial cellulitis vs Hydradenitis left groin and pubic area      # DKA  # Metabolic acidosis  PH 7.17 > 7.08  HCO3 8 > 5  cw insulin drip   with Potassium 40 meq  100 meq bicarb push then continue with the bicarb drip  fu stat bmp  repeat ABGs around 8 pm  NPO for now  FS q1 for now  fu lytes and replete      # Superficial purulent cellulitis vs Hydradenitis left groin and pubic area  fu blood cultures  fu abscess cultures  fu mrsa  cw vanco and clinda for now  fu ID eval      # Hyponatremia  Na corrected for Glucose ia 141  likely secondary to hyperglycemia  fu repeat bmp, monitor for now    # HTN  holding home meds for now      # CKD  avoid nephrotoxins      # Obesity          DVT PPx: heparin  GI ppx: not indicated  Diet: npo for now  Activity: bedrest

## 2021-04-07 NOTE — ED PROVIDER NOTE - OBJECTIVE STATEMENT
61 y/o male with a PMH of DM, HTN, CKD who presents to the ED for evaluation of with weakness and fall yesterday. pt reports sob. During evaluation, pt reports that for the past two days, he has not been taking his insulin. pt denies loc, headache, visual changes, neck pain, abdominal pain, n/v/d/c, fever, chills, cough, urinary symptoms, dizziness, urinary or bowel retention or incontinence.

## 2021-04-07 NOTE — ED PROVIDER NOTE - ATTENDING CONTRIBUTION TO CARE
60 yr old m w/ a pmh significant for DM, HTN, CKD who presents with weakness and fall. Pt states that he fell yesterday (pt denies hitting his head) and has been weak. During evaluation, pt reports that for the past two days, he has not been taking his insulin. Pt denies any fevers, chills, nausea, vomiting, chest pain, sob or any other complaints. Of note, in triage pt noted to have a FSG >600     VITAL SIGNS: I have reviewed nursing notes and confirm.  CONSTITUTIONAL: non-toxic, well appearing  SKIN: no rash, no petechiae.  EYES: PERRL, EOMI, pink conjunctiva, anicteric  ENT: tongue midline, no exudates, MMM  NECK: Supple; no meningismus, no JVD  CARD: RRR, no murmurs, equal radial pulses bilaterally 2+  RESP: CTAB, tachypneic   ABD: Soft, non-tender, non-distended, no peritoneal signs, no HSM, no CVA tenderness  : the scrotum is beefy red, there is evidence of fungal infection, yellow foul smelling drainage from the area.   EXT: Normal ROM x4. No edema. No calves tenderness  NEURO: Alert, oriented. CN2-12 intact, equal strength bilaterally, nl gait. as per ems, pts slurred speech is at his baseline (confirmed with landlord)   PSYCH: Cooperative, appropriate.    a/p  60 yr old m who presents with hyperglycemia, concern for forniers  -labs  -cxr  -urology and surgery aware of pt   -IVF  -IV antibiotics  -insulin drip   -admit

## 2021-04-07 NOTE — H&P ADULT - NSICDXPASTMEDICALHX_GEN_ALL_CORE_FT
PAST MEDICAL HISTORY:  Diabetes      PAST MEDICAL HISTORY:  Diabetes mellitus     Dyslipidemia     Hypertension

## 2021-04-07 NOTE — ED PROVIDER NOTE - PROGRESS NOTE DETAILS
FF: spoke with urology, will consult. concern for fourniers. FF: pt seen by urology, they believe pt has multiple abscess in the groin region not involving the scrotum. urology wants ct of the groin and us of testicles. pt is not stable to go to us, ordered it as bedside. FF: dr. flores spoke with surgery will consult FF: surg signed off on pt, not a surgical candidate pt accepts by mic[facility] under dr. morales. spoke with icu fellow dr. valdez. pt is on bicarb drip and insulin drip 15 units.

## 2021-04-07 NOTE — ED ADULT TRIAGE NOTE - CHIEF COMPLAINT QUOTE
Pt called ems bcause he fell yesterday, on arrival to home pt was in bed, tachypneic, hyperglycemic fs > 500. Pt aox4, states he has not taken insulin in 2 days, unable to obtain accurate HR, temp, and sp02 in triage.

## 2021-04-07 NOTE — CONSULT NOTE ADULT - SUBJECTIVE AND OBJECTIVE BOX
Urology Consult:   59 yo M with PMH with DM2, Diabetic peripheral neuropathy, Diabetic retinopathy, HTN, Morbid Obesity,  CKD stage3, ATN, BIBEMS for s/p fall on 4/6- Urology consulted for possible sadie's gangrene. Patient states reports he called EMS because he fell yesterday and could not walk. Patient is a poor historian, unable to illicit any subjective information, history obtained from extensive chart review. Patient Patient denies any fever, chills, N/V, SOB, CP, abdominal pain, flank pain,     In ED patient unable to obtain temp, bradycardic to 50, hyperglycemic 687, hyponatremia, hyperkalemic, Cr 2.2 (baseline 1.9-2.0) BUN 38 (baseline 38-40). Patient recieved IV Cefepime, IV Vancomycin and IV Clindamycin.     PAST MEDICAL & SURGICAL HISTORY:  Diabetes    No significant past surgical history        MEDICATIONS  (STANDING):  cefepime   IVPB 2000 milliGRAM(s) IV Intermittent once  clindamycin IVPB 900 milliGRAM(s) IV Intermittent once  insulin regular Infusion 0.1 Unit(s)/Hr (0.1 mL/Hr) IV Continuous <Continuous>  lactated ringers Bolus 1000 milliLiter(s) IV Bolus once  lactated ringers Bolus 1000 milliLiter(s) IV Bolus once  lactated ringers Bolus 1000 milliLiter(s) IV Bolus once  vancomycin  IVPB. 1000 milliGRAM(s) IV Intermittent once    Home medication ):  Lisinopril 10 MG Oral Tablet Take 1 tablet daily Quantity: 90   Atorvastatin Calcium 40 MG Oral Tablet TAKE 1 TABLET EVERY DAY Quantity: 90   Ezetimibe 10 MG Oral Tablet TAKE 1 TABLET BY MOUTH EVERY DAY Quantity: 90   Steglatro 15 MG Oral Tablet TAKE 1 TABLET BY MOUTH ONCE DAILY Quantity: 30   Ozempic (1 MG/DOSE) 2 MG/1.5ML Subcutaneous Solution Pen-injector INJECT 1 MG Weekly Quantity: 1 2 x 1.5 ML Pen   Tresiba FlexTouch 200 UNIT/ML Subcutaneous Solution Pen-injector INJECT 80 UNITS SUBCUTANEOUSLY DAILY Quantity: 3 3 x 3 ML Pen   Pioglitazone HCl - 30 MG Oral Tablet TAKE 1 TABLET BY MOUTH DAILY Quantity: 90   Gabapentin 600 MG Oral Tablet TAKE 1 TABLET 3 times daily Quantity: 270   AmLactin 12 % External Lotion APPLY AND RUB IN A THIN FILM TO AFFECTED AREAS TWICE DAILY.(AM AND PM). Quantity: 1 57 GM Bottle   Ciclopirox 8 % External Solution Apply to affected nail bed(s) and adjacent skin daily. Remove with alcohol every 7 days. Quantity: 1 6.6 ML Bottle   Clotrimazole 1 % External Cream apply to affected area of toe nails topically twice dailly Quantity: 1 15 GM Tube   Naftin 1 % External Gel APPLY AND GENTLY MASSAGE INTO AFFECTED AREA(S) TWICE DAILY. Quantity: 60   Vitamin D 50 MCG (2000 UT) Oral Capsule TAKE 1 CAPSULE Daily       Allergies    No Known Allergies    Intolerances        SOCIAL HISTORY: No illicit drug use        REVIEW OF SYSTEMS:    Vital Signs Last 24 Hrs  HR: 50 (07 Apr 2021 13:31) (50 - 50)  BP: 130/70 (07 Apr 2021 13:31) (109/54 - 130/70)  RR: 26 (07 Apr 2021 13:31) (26 - 36)  SpO2: 95% (07 Apr 2021 13:31) (95% - 95%)    PHYSICAL EXAM:    GEN: NAD, well-developed, awake and alert. Patient agitated and trying to jump out of his bed   SKIN: Good color, non diaphoretic.  HEENT: NC/AT.  RESP: labored breathing, No dyspnea, non-labored breathing.   CARDIO: +S1/S2  ABDO: ++Morbidly obese, +erythematous and edematous suprapubic region with multiple loculated abscess, +multiple indurated regions able to express malodorous purulent drainage, no area of fluctuance;  intertriginous rash noted to between pannus and suprapubic region,   BACK: No CVAT B/L  : ++severe balanitis with malodorous, foul-smelling smegma/ meatal discharge, frenulum fixed  + Non circumcised male, meatus noted to be beefy red. B/L descended testicles x 2, nontender to palpation.        I&O's Summary      LABS:                        16.4   19.25 )-----------( 482      ( 07 Apr 2021 11:45 )             50.0     04-07    129<L>  |  83<L>  |  38<H>  ----------------------------<  687<HH>  5.6<H>   |  8<LL>  |  2.2<H>    Ca    10.7<H>      07 Apr 2021 11:45  Mg     2.7     04-07    TPro  8.3<H>  /  Alb  4.7  /  TBili  0.7  /  DBili  x   /  AST  11  /  ALT  20  /  AlkPhos  161<H>  04-07    PT/INR - ( 07 Apr 2021 11:45 )   PT: 11.20 sec;   INR: 0.97 ratio         PTT - ( 07 Apr 2021 11:45 )  PTT:31.1 sec          RADIOLOGY & ADDITIONAL STUDIES:  c Urology Consult:   59 yo M with PMH with DM2, Diabetic peripheral neuropathy, Diabetic retinopathy, HTN, Morbid Obesity,  CKD stage3, ATN, BIBEMS for s/p fall on 4/6- Urology consulted for possible sadie's gangrene. Patient states reports he called EMS after falling yesterday and was unable to ambulate. Patient is a poor historian, unable to illicit any subjective information, history obtained from extensive chart review. Patient denies any fever, chills, N/V, SOB, CP, abdominal pain, flank pain. Patient noted to have erythema below pannus extending to suprapubic region with multiple loculated abscess around region.     In ED patient unable to obtain temp on admission, bradycardic to 50, hyperglycemic 687, hyponatremia 129 , hyperkalemic 5.9, Cr 2.2 (baseline 1.9-2.0) BUN 38 (baseline 38-40). Patient received IV Cefepime, IV Vancomycin and IV Clindamycin.     PAST MEDICAL & SURGICAL HISTORY:  Diabetes    No significant past surgical history        MEDICATIONS  (STANDING):  cefepime   IVPB 2000 milliGRAM(s) IV Intermittent once  clindamycin IVPB 900 milliGRAM(s) IV Intermittent once  insulin regular Infusion 0.1 Unit(s)/Hr (0.1 mL/Hr) IV Continuous <Continuous>  lactated ringers Bolus 1000 milliLiter(s) IV Bolus once  lactated ringers Bolus 1000 milliLiter(s) IV Bolus once  lactated ringers Bolus 1000 milliLiter(s) IV Bolus once  vancomycin  IVPB. 1000 milliGRAM(s) IV Intermittent once    Home medication ):  Lisinopril 10 MG Oral Tablet Take 1 tablet daily Quantity: 90   Atorvastatin Calcium 40 MG Oral Tablet TAKE 1 TABLET EVERY DAY Quantity: 90   Ezetimibe 10 MG Oral Tablet TAKE 1 TABLET BY MOUTH EVERY DAY Quantity: 90   Steglatro 15 MG Oral Tablet TAKE 1 TABLET BY MOUTH ONCE DAILY Quantity: 30   Ozempic (1 MG/DOSE) 2 MG/1.5ML Subcutaneous Solution Pen-injector INJECT 1 MG Weekly Quantity: 1 2 x 1.5 ML Pen   Tresiba FlexTouch 200 UNIT/ML Subcutaneous Solution Pen-injector INJECT 80 UNITS SUBCUTANEOUSLY DAILY Quantity: 3 3 x 3 ML Pen   Pioglitazone HCl - 30 MG Oral Tablet TAKE 1 TABLET BY MOUTH DAILY Quantity: 90   Gabapentin 600 MG Oral Tablet TAKE 1 TABLET 3 times daily Quantity: 270   AmLactin 12 % External Lotion APPLY AND RUB IN A THIN FILM TO AFFECTED AREAS TWICE DAILY.(AM AND PM). Quantity: 1 57 GM Bottle   Ciclopirox 8 % External Solution Apply to affected nail bed(s) and adjacent skin daily. Remove with alcohol every 7 days. Quantity: 1 6.6 ML Bottle   Clotrimazole 1 % External Cream apply to affected area of toe nails topically twice dailly Quantity: 1 15 GM Tube   Naftin 1 % External Gel APPLY AND GENTLY MASSAGE INTO AFFECTED AREA(S) TWICE DAILY. Quantity: 60   Vitamin D 50 MCG (2000 UT) Oral Capsule TAKE 1 CAPSULE Daily       Allergies    No Known Allergies    Intolerances        SOCIAL HISTORY: No illicit drug use        REVIEW OF SYSTEMS:    Vital Signs Last 24 Hrs  HR: 50 (07 Apr 2021 13:31) (50 - 50)  BP: 130/70 (07 Apr 2021 13:31) (109/54 - 130/70)  RR: 26 (07 Apr 2021 13:31) (26 - 36)  SpO2: 95% (07 Apr 2021 13:31) (95% - 95%)    PHYSICAL EXAM:    GEN: NAD, well-developed, awake and alert. Patient agitated and trying to jump out of his bed   SKIN: Good color, non diaphoretic.  HEENT: NC/AT.  RESP: labored breathing, No dyspnea, non-labored breathing.   CARDIO: +S1/S2  ABDO: ++Morbidly obese, +erythematous and edematous suprapubic region with multiple loculated abscess, +multiple indurated regions able to express malodorous purulent drainage, no area of fluctuance;  intertriginous rash noted to between pannus and suprapubic region,   BACK: No CVAT B/L  : ++severe balanitis with malodorous, foul-smelling smegma/ meatal discharge, frenulum fixed  + Non circumcised male, meatus noted to be beefy red. B/L descended testicles x 2, nontender to palpation.        I&O's Summary      LABS:                        16.4   19.25 )-----------( 482      ( 07 Apr 2021 11:45 )             50.0     04-07    129<L>  |  83<L>  |  38<H>  ----------------------------<  687<HH>  5.6<H>   |  8<LL>  |  2.2<H>    Ca    10.7<H>      07 Apr 2021 11:45  Mg     2.7     04-07    TPro  8.3<H>  /  Alb  4.7  /  TBili  0.7  /  DBili  x   /  AST  11  /  ALT  20  /  AlkPhos  161<H>  04-07    PT/INR - ( 07 Apr 2021 11:45 )   PT: 11.20 sec;   INR: 0.97 ratio         PTT - ( 07 Apr 2021 11:45 )  PTT:31.1 sec          RADIOLOGY & ADDITIONAL STUDIES:  c

## 2021-04-07 NOTE — CONSULT NOTE ADULT - SUBJECTIVE AND OBJECTIVE BOX
THOMAS HARDIN [id number]  60y Male    HPI:  60M w/PMHx of uncontrolled DM2, diabetic retinopathy and neuropathy, HTN, morbid obesity, CKD 3, who was BIBEMS today after falling at home yesterday and unable to get up. Patient is a poor historian and does    PAST MEDICAL & SURGICAL HISTORY:  Diabetes  No significant past surgical history    MEDICATIONS  (STANDING):  cefepime   IVPB 2000 milliGRAM(s) IV Intermittent once  clindamycin IVPB 900 milliGRAM(s) IV Intermittent once  insulin regular Infusion 0.1 Unit(s)/Hr (0.1 mL/Hr) IV Continuous <Continuous>  lactated ringers Bolus 1000 milliLiter(s) IV Bolus once  vancomycin  IVPB. 1000 milliGRAM(s) IV Intermittent once    Allergies  No Known Allergies    REVIEW OF SYSTEMS    [ X ] A ten-point review of systems was otherwise negative except as noted.  [ ] Due to altered mental status/intubation, subjective information were not able to be obtained from the patient. History was obtained, to the extent possible, from review of the chart and collateral sources of information.    Vital Signs Last 24 Hrs  HR: 100 (07 Apr 2021 15:28) (50 - 100)  BP: 131/61 (07 Apr 2021 15:28) (109/54 - 131/61)  RR: 28 (07 Apr 2021 15:28) (26 - 36)  SpO2: 94% (07 Apr 2021 15:28) (94% - 95%)    PHYSICAL EXAM:  GENERAL: NAD, well-appearing  CHEST/LUNG: Clear to auscultation bilaterally  HEART: Regular rate and rhythm  ABDOMEN: Soft, Nontender, Nondistended;   EXTREMITIES:  No clubbing, cyanosis, or edema    LABS:    POCT Blood Glucose.: >600 mg/dL (07 Apr 2021 15:15)                        16.4   19.25 )-----------( 482      ( 07 Apr 2021 11:45 )             50.0       Auto Neutrophil %: 89.0 % (04-07-21 @ 11:45)  Auto Immature Granulocyte %: 0.7 % (04-07-21 @ 11:45)    04-07    129<L>  |  83<L>  |  38<H>  ----------------------------<  687<HH>  5.6<H>   |  8<LL>  |  2.2<H>    Calcium, Total Serum: 10.7 mg/dL (04-07-21 @ 11:45)    LFTs:             8.3  | 0.7  | 11       ------------------[161     ( 07 Apr 2021 11:45 )  4.7  | x    | 20          Blood Gas Venous - Lactate: 2.3 mmoL/L (04-07-21 @ 11:54)    Coags:     11.20  ----< 0.97    ( 07 Apr 2021 11:45 )     31.1      CARDIAC MARKERS ( 07 Apr 2021 11:45 )  x     / 0.01 ng/mL / x     / x     / x        Serum Pro-Brain Natriuretic Peptide: 217 pg/mL (04-07-21 @ 11:45)    RADIOLOGY & ADDITIONAL STUDIES:  **Pending CT scan of pelvis** THOMAS HARDIN [id number]  60y Male    HPI:  60M w/PMHx of uncontrolled DM2, diabetic retinopathy and neuropathy, HTN, morbid obesity, CKD 3, who called EMS today after falling at home yesterday and was unable to get up. Patient is a poor historian and does not recall the events leading up to the fall. He was noted in the ED to be tachypneic with increased respiratory effort. The ED was unable to obtain a temperature on arrival, he was bradycardic to 50 bpm, 130/70, saturating 95% on 6L NC. His exam is notable for erythema of of the groin below the panus as well as on other side of the penis and scrotum. Scrotum is indurated. There are numerous areas of skin excoriation with what appears to be purulent discharge. Patient was evaluated by Urology in the ED, team cultured areas of drainage. Differential from their standpoint is Fourniers vs. hidradenitis. Labs grossly deranged, including glucose of 687, WBC of 19.2, hyponatremic to 129, BUN/Cr 38/2.2. Beta-hydroxybuturate >9     PAST MEDICAL & SURGICAL HISTORY:  Diabetes  No significant past surgical history    MEDICATIONS  (STANDING):  cefepime   IVPB 2000 milliGRAM(s) IV Intermittent once  clindamycin IVPB 900 milliGRAM(s) IV Intermittent once  insulin regular Infusion 0.1 Unit(s)/Hr (0.1 mL/Hr) IV Continuous <Continuous>  lactated ringers Bolus 1000 milliLiter(s) IV Bolus once  vancomycin  IVPB. 1000 milliGRAM(s) IV Intermittent once    Allergies  No Known Allergies    REVIEW OF SYSTEMS    [ X ] A ten-point review of systems was otherwise negative except as noted.  [ ] Due to altered mental status/intubation, subjective information were not able to be obtained from the patient. History was obtained, to the extent possible, from review of the chart and collateral sources of information.    Vital Signs Last 24 Hrs  HR: 100 (07 Apr 2021 15:28) (50 - 100)  BP: 131/61 (07 Apr 2021 15:28) (109/54 - 131/61)  RR: 28 (07 Apr 2021 15:28) (26 - 36)  SpO2: 94% (07 Apr 2021 15:28) (94% - 95%)    PHYSICAL EXAM:  GENERAL: NAD, well-appearing  CHEST/LUNG: Clear to auscultation bilaterally  HEART: Regular rate and rhythm  ABDOMEN: Soft, Nontender, Nondistended;   EXTREMITIES:  No clubbing, cyanosis, or edema    LABS:    POCT Blood Glucose.: >600 mg/dL (07 Apr 2021 15:15)                        16.4   19.25 )-----------( 482      ( 07 Apr 2021 11:45 )             50.0       Auto Neutrophil %: 89.0 % (04-07-21 @ 11:45)  Auto Immature Granulocyte %: 0.7 % (04-07-21 @ 11:45)    04-07    129<L>  |  83<L>  |  38<H>  ----------------------------<  687<HH>  5.6<H>   |  8<LL>  |  2.2<H>    Calcium, Total Serum: 10.7 mg/dL (04-07-21 @ 11:45)    LFTs:             8.3  | 0.7  | 11       ------------------[161     ( 07 Apr 2021 11:45 )  4.7  | x    | 20          Blood Gas Venous - Lactate: 2.3 mmoL/L (04-07-21 @ 11:54)    Coags:     11.20  ----< 0.97    ( 07 Apr 2021 11:45 )     31.1      CARDIAC MARKERS ( 07 Apr 2021 11:45 )  x     / 0.01 ng/mL / x     / x     / x        Serum Pro-Brain Natriuretic Peptide: 217 pg/mL (04-07-21 @ 11:45)    RADIOLOGY & ADDITIONAL STUDIES:  **Pending CT scan of pelvis** THOMAS HARDIN [id number]  60y Male    HPI:  60M w/PMHx of uncontrolled DM2, diabetic retinopathy and neuropathy, HTN, morbid obesity, CKD 3, who called EMS today after falling at home yesterday and was unable to get up. Patient is a poor historian and does not recall the events leading up to the fall. He was noted in the ED to be tachypneic with increased respiratory effort. The ED was unable to obtain a temperature on arrival, he was bradycardic to 50 bpm, 130/70, saturating 95% on 6L NC. His exam is notable for erythema of of the groin below the panus as well as on other side of the penis and scrotum. Scrotum is indurated. There are numerous areas of skin excoriation with what appears to be purulent discharge. Patient was evaluated by Urology in the ED, team cultured areas of drainage. Differential from their standpoint is Fourniers vs. hidradenitis. Labs grossly deranged, including glucose of 687, WBC of 19.2, hyponatremic to 129, BUN/Cr 38/2.2. Beta-hydroxybuturate >9, consistent with DKA. CT scan demonstrated edema/inflammatory changes in the soft tissues of the anterior pelvis involving the left groin and pubic region. Small 2.1 cm fluid pocket along the skin surface. No soft tissue gas identified.    PAST MEDICAL & SURGICAL HISTORY:  Diabetes  No significant past surgical history    MEDICATIONS  (STANDING):  cefepime   IVPB 2000 milliGRAM(s) IV Intermittent once  clindamycin IVPB 900 milliGRAM(s) IV Intermittent once  insulin regular Infusion 0.1 Unit(s)/Hr (0.1 mL/Hr) IV Continuous <Continuous>  lactated ringers Bolus 1000 milliLiter(s) IV Bolus once  vancomycin  IVPB. 1000 milliGRAM(s) IV Intermittent once    Allergies  No Known Allergies    REVIEW OF SYSTEMS    [ X ] A ten-point review of systems was otherwise negative except as noted.  [ ] Due to altered mental status/intubation, subjective information were not able to be obtained from the patient. History was obtained, to the extent possible, from review of the chart and collateral sources of information.    Vital Signs Last 24 Hrs  HR: 100 (07 Apr 2021 15:28) (50 - 100)  BP: 131/61 (07 Apr 2021 15:28) (109/54 - 131/61)  RR: 28 (07 Apr 2021 15:28) (26 - 36)  SpO2: 94% (07 Apr 2021 15:28) (94% - 95%)    PHYSICAL EXAM:  GENERAL: NAD, well-appearing  CHEST/LUNG: Clear to auscultation bilaterally  HEART: Regular rate and rhythm  ABDOMEN: Soft, Nontender, Nondistended;   EXTREMITIES:  No clubbing, cyanosis, or edema    LABS:    POCT Blood Glucose.: >600 mg/dL (07 Apr 2021 15:15)                        16.4   19.25 )-----------( 482      ( 07 Apr 2021 11:45 )             50.0       Auto Neutrophil %: 89.0 % (04-07-21 @ 11:45)  Auto Immature Granulocyte %: 0.7 % (04-07-21 @ 11:45)    04-07    129<L>  |  83<L>  |  38<H>  ----------------------------<  687<HH>  5.6<H>   |  8<LL>  |  2.2<H>    Calcium, Total Serum: 10.7 mg/dL (04-07-21 @ 11:45)    LFTs:             8.3  | 0.7  | 11       ------------------[161     ( 07 Apr 2021 11:45 )  4.7  | x    | 20          Blood Gas Venous - Lactate: 2.3 mmoL/L (04-07-21 @ 11:54)    Coags:     11.20  ----< 0.97    ( 07 Apr 2021 11:45 )     31.1      CARDIAC MARKERS ( 07 Apr 2021 11:45 )  x     / 0.01 ng/mL / x     / x     / x        Serum Pro-Brain Natriuretic Peptide: 217 pg/mL (04-07-21 @ 11:45)    RADIOLOGY & ADDITIONAL STUDIES:  < from: CT Abdomen and Pelvis w/ IV Cont (04.07.21 @ 16:05) >  IMPRESSION:  Edema/inflammatory changes in the soft tissues of the anterior pelvis involving the left groin and pubic region. Small 2.1 cm fluid pocket along the skin surface. No soft tissue gas identified.

## 2021-04-07 NOTE — H&P ADULT - HISTORY OF PRESENT ILLNESS
60 years old male known to have uncontrolled DM2, diabetic retinopathy and neuropathy, HTN, morbid obesity, CKD 3, who called EMS today after falling at home yesterday and was unable to get up.     Patient is a poor historian and does not recall the events leading up to the fall. Pt on my examination is Alert and awake but does not recall the events leading to hosp adm. History obtained from ED staff and charts.  He was noted in the ED to be tachypneic with increased respiratory effort. In the ED the FS was initially undetectable, then > 600. Labs grossly deranged, including glucose of 687, WBC of 19.2, hyponatremic to 129, BUN/Cr 38/2.2. Beta-hydroxybuturate >9, consistent with DKA. HCO3 8 > 3 s/p 3 bicarbonate iv pushes, started on bicarbonate drip. ABGs showed PH 7.17 > 7.08. Pt received 3 L NS.    The ED was unable to obtain a temperature on arrival, he was bradycardic to 50 bpm, 130/70, saturating 95% on 6L NC initially but on my examination pt was > 92% on RA. His exam is notable for erythema of the groin below the panus as well as on other side of the penis and scrotum. Scrotum is indurated. There are numerous areas of skin excoriation with what appears to be purulent discharge. CT scan demonstrated edema/inflammatory changes in the soft tissues of the anterior pelvis involving the left groin and pubic region. Small 2.1 cm fluid pocket along the skin surface. No soft tissue gas identified. Patient was evaluated by Urology in the ED, team cultured areas of drainage. Differential from their standpoint is Fourniers vs. hidradenitis. Surgery Dr Hopkins evaluated the pt and recs it is superficial cellulitis and to Rx with iv abx 60 years old male known to have uncontrolled DM2, diabetic retinopathy and neuropathy, HTN, morbid obesity, CKD 3, who called EMS today after falling at home yesterday and was unable to get up.     Pt on my examination is Alert and awake but does not recall the events leading to hosp adm. History obtained from ED staff and charts.  He was noted in the ED to be tachypneic with increased respiratory effort. In the ED the FS was initially undetectable, then > 600. Labs grossly deranged, including glucose of 687, WBC of 19.2, hyponatremic to 129, BUN/Cr 38/2.2. Beta-hydroxybuturate >9, consistent with DKA. HCO3 8 > 3 s/p 3 bicarbonate iv pushes, started on bicarbonate drip. ABGs showed PH 7.17 > 7.08. Pt received 3 L NS.    The ED was unable to obtain a temperature on arrival, he was bradycardic to 50 bpm, 130/70, saturating 95% on 6L NC initially but on my examination pt was > 92% on RA. His exam is notable for erythema of the groin below the panus as well as on other side of the penis and scrotum. Scrotum is indurated. There are numerous areas of skin excoriation with what appears to be purulent discharge. CT scan demonstrated edema/inflammatory changes in the soft tissues of the anterior pelvis involving the left groin and pubic region. Small 2.1 cm fluid pocket along the skin surface. No soft tissue gas identified. Patient was evaluated by Urology in the ED, team cultured areas of drainage. Differential from their standpoint is Fourniers vs. hidradenitis. Surgery Dr Hopkins evaluated the pt and recs it is superficial cellulitis and to Rx with iv abx.  could not do the medrec with the pt, done as per Dr Cormier's outpt notes

## 2021-04-08 ENCOUNTER — RESULT REVIEW (OUTPATIENT)
Age: 61
End: 2021-04-08

## 2021-04-08 LAB
A1C WITH ESTIMATED AVERAGE GLUCOSE RESULT: >15.5 % — HIGH (ref 4–5.6)
ANION GAP SERPL CALC-SCNC: 14 MMOL/L — SIGNIFICANT CHANGE UP (ref 7–14)
ANION GAP SERPL CALC-SCNC: 18 MMOL/L — HIGH (ref 7–14)
ANION GAP SERPL CALC-SCNC: 18 MMOL/L — HIGH (ref 7–14)
BASOPHILS # BLD AUTO: 0.03 K/UL — SIGNIFICANT CHANGE UP (ref 0–0.2)
BASOPHILS NFR BLD AUTO: 0.1 % — SIGNIFICANT CHANGE UP (ref 0–1)
BLD GP AB SCN SERPL QL: SIGNIFICANT CHANGE UP
BUN SERPL-MCNC: 37 MG/DL — HIGH (ref 10–20)
BUN SERPL-MCNC: 41 MG/DL — HIGH (ref 10–20)
BUN SERPL-MCNC: 48 MG/DL — HIGH (ref 10–20)
CALCIUM SERPL-MCNC: 8.6 MG/DL — SIGNIFICANT CHANGE UP (ref 8.5–10.1)
CALCIUM SERPL-MCNC: 9 MG/DL — SIGNIFICANT CHANGE UP (ref 8.5–10.1)
CALCIUM SERPL-MCNC: 9.2 MG/DL — SIGNIFICANT CHANGE UP (ref 8.5–10.1)
CHLORIDE SERPL-SCNC: 103 MMOL/L — SIGNIFICANT CHANGE UP (ref 98–110)
CHLORIDE SERPL-SCNC: 106 MMOL/L — SIGNIFICANT CHANGE UP (ref 98–110)
CHLORIDE SERPL-SCNC: 107 MMOL/L — SIGNIFICANT CHANGE UP (ref 98–110)
CHOLEST SERPL-MCNC: 129 MG/DL — SIGNIFICANT CHANGE UP
CO2 SERPL-SCNC: 17 MMOL/L — SIGNIFICANT CHANGE UP (ref 17–32)
CO2 SERPL-SCNC: 19 MMOL/L — SIGNIFICANT CHANGE UP (ref 17–32)
CO2 SERPL-SCNC: 20 MMOL/L — SIGNIFICANT CHANGE UP (ref 17–32)
CREAT SERPL-MCNC: 1.8 MG/DL — HIGH (ref 0.7–1.5)
CREAT SERPL-MCNC: 1.9 MG/DL — HIGH (ref 0.7–1.5)
CREAT SERPL-MCNC: 2.2 MG/DL — HIGH (ref 0.7–1.5)
EOSINOPHIL # BLD AUTO: 0 K/UL — SIGNIFICANT CHANGE UP (ref 0–0.7)
EOSINOPHIL NFR BLD AUTO: 0 % — SIGNIFICANT CHANGE UP (ref 0–8)
ESTIMATED AVERAGE GLUCOSE: >398 MG/DL — HIGH (ref 68–114)
GLUCOSE BLDC GLUCOMTR-MCNC: 115 MG/DL — HIGH (ref 70–99)
GLUCOSE BLDC GLUCOMTR-MCNC: 118 MG/DL — HIGH (ref 70–99)
GLUCOSE BLDC GLUCOMTR-MCNC: 143 MG/DL — HIGH (ref 70–99)
GLUCOSE BLDC GLUCOMTR-MCNC: 157 MG/DL — HIGH (ref 70–99)
GLUCOSE BLDC GLUCOMTR-MCNC: 162 MG/DL — HIGH (ref 70–99)
GLUCOSE BLDC GLUCOMTR-MCNC: 181 MG/DL — HIGH (ref 70–99)
GLUCOSE BLDC GLUCOMTR-MCNC: 187 MG/DL — HIGH (ref 70–99)
GLUCOSE BLDC GLUCOMTR-MCNC: 208 MG/DL — HIGH (ref 70–99)
GLUCOSE BLDC GLUCOMTR-MCNC: 221 MG/DL — HIGH (ref 70–99)
GLUCOSE BLDC GLUCOMTR-MCNC: 278 MG/DL — HIGH (ref 70–99)
GLUCOSE BLDC GLUCOMTR-MCNC: 278 MG/DL — HIGH (ref 70–99)
GLUCOSE BLDC GLUCOMTR-MCNC: 281 MG/DL — HIGH (ref 70–99)
GLUCOSE BLDC GLUCOMTR-MCNC: 282 MG/DL — HIGH (ref 70–99)
GLUCOSE BLDC GLUCOMTR-MCNC: 289 MG/DL — HIGH (ref 70–99)
GLUCOSE BLDC GLUCOMTR-MCNC: 330 MG/DL — HIGH (ref 70–99)
GLUCOSE BLDC GLUCOMTR-MCNC: 346 MG/DL — HIGH (ref 70–99)
GLUCOSE BLDC GLUCOMTR-MCNC: 396 MG/DL — HIGH (ref 70–99)
GLUCOSE BLDC GLUCOMTR-MCNC: 475 MG/DL — CRITICAL HIGH (ref 70–99)
GLUCOSE BLDC GLUCOMTR-MCNC: 68 MG/DL — LOW (ref 70–99)
GLUCOSE BLDC GLUCOMTR-MCNC: 76 MG/DL — SIGNIFICANT CHANGE UP (ref 70–99)
GLUCOSE SERPL-MCNC: 102 MG/DL — HIGH (ref 70–99)
GLUCOSE SERPL-MCNC: 205 MG/DL — HIGH (ref 70–99)
GLUCOSE SERPL-MCNC: 282 MG/DL — HIGH (ref 70–99)
HCT VFR BLD CALC: 39.3 % — LOW (ref 42–52)
HCV AB S/CO SERPL IA: 0.03 COI — SIGNIFICANT CHANGE UP
HCV AB SERPL-IMP: SIGNIFICANT CHANGE UP
HDLC SERPL-MCNC: 49 MG/DL — SIGNIFICANT CHANGE UP
HGB BLD-MCNC: 13.5 G/DL — LOW (ref 14–18)
IMM GRANULOCYTES NFR BLD AUTO: 0.6 % — HIGH (ref 0.1–0.3)
LIPID PNL WITH DIRECT LDL SERPL: 61 MG/DL — SIGNIFICANT CHANGE UP
LYMPHOCYTES # BLD AUTO: 0.58 K/UL — LOW (ref 1.2–3.4)
LYMPHOCYTES # BLD AUTO: 2.8 % — LOW (ref 20.5–51.1)
MAGNESIUM SERPL-MCNC: 2.2 MG/DL — SIGNIFICANT CHANGE UP (ref 1.8–2.4)
MCHC RBC-ENTMCNC: 27.6 PG — SIGNIFICANT CHANGE UP (ref 27–31)
MCHC RBC-ENTMCNC: 34.4 G/DL — SIGNIFICANT CHANGE UP (ref 32–37)
MCV RBC AUTO: 80.4 FL — SIGNIFICANT CHANGE UP (ref 80–94)
MONOCYTES # BLD AUTO: 1.83 K/UL — HIGH (ref 0.1–0.6)
MONOCYTES NFR BLD AUTO: 8.9 % — SIGNIFICANT CHANGE UP (ref 1.7–9.3)
NEUTROPHILS # BLD AUTO: 17.94 K/UL — HIGH (ref 1.4–6.5)
NEUTROPHILS NFR BLD AUTO: 87.6 % — HIGH (ref 42.2–75.2)
NON HDL CHOLESTEROL: 80 MG/DL — SIGNIFICANT CHANGE UP
NRBC # BLD: 0 /100 WBCS — SIGNIFICANT CHANGE UP (ref 0–0)
PHOSPHATE SERPL-MCNC: 2.4 MG/DL — SIGNIFICANT CHANGE UP (ref 2.1–4.9)
PLATELET # BLD AUTO: 385 K/UL — SIGNIFICANT CHANGE UP (ref 130–400)
POTASSIUM SERPL-MCNC: 3.7 MMOL/L — SIGNIFICANT CHANGE UP (ref 3.5–5)
POTASSIUM SERPL-MCNC: 3.7 MMOL/L — SIGNIFICANT CHANGE UP (ref 3.5–5)
POTASSIUM SERPL-MCNC: 4.2 MMOL/L — SIGNIFICANT CHANGE UP (ref 3.5–5)
POTASSIUM SERPL-SCNC: 3.7 MMOL/L — SIGNIFICANT CHANGE UP (ref 3.5–5)
POTASSIUM SERPL-SCNC: 3.7 MMOL/L — SIGNIFICANT CHANGE UP (ref 3.5–5)
POTASSIUM SERPL-SCNC: 4.2 MMOL/L — SIGNIFICANT CHANGE UP (ref 3.5–5)
RBC # BLD: 4.89 M/UL — SIGNIFICANT CHANGE UP (ref 4.7–6.1)
RBC # FLD: 13.3 % — SIGNIFICANT CHANGE UP (ref 11.5–14.5)
SODIUM SERPL-SCNC: 138 MMOL/L — SIGNIFICANT CHANGE UP (ref 135–146)
SODIUM SERPL-SCNC: 141 MMOL/L — SIGNIFICANT CHANGE UP (ref 135–146)
SODIUM SERPL-SCNC: 143 MMOL/L — SIGNIFICANT CHANGE UP (ref 135–146)
TRIGL SERPL-MCNC: 157 MG/DL — HIGH
WBC # BLD: 20.51 K/UL — HIGH (ref 4.8–10.8)
WBC # FLD AUTO: 20.51 K/UL — HIGH (ref 4.8–10.8)

## 2021-04-08 PROCEDURE — 99232 SBSQ HOSP IP/OBS MODERATE 35: CPT

## 2021-04-08 PROCEDURE — 71045 X-RAY EXAM CHEST 1 VIEW: CPT | Mod: 26

## 2021-04-08 PROCEDURE — 99221 1ST HOSP IP/OBS SF/LOW 40: CPT

## 2021-04-08 PROCEDURE — 93010 ELECTROCARDIOGRAM REPORT: CPT

## 2021-04-08 PROCEDURE — 88312 SPECIAL STAINS GROUP 1: CPT | Mod: 26

## 2021-04-08 RX ORDER — MORPHINE SULFATE 50 MG/1
2 CAPSULE, EXTENDED RELEASE ORAL ONCE
Refills: 0 | Status: DISCONTINUED | OUTPATIENT
Start: 2021-04-08 | End: 2021-04-08

## 2021-04-08 RX ORDER — METRONIDAZOLE 500 MG
500 TABLET ORAL ONCE
Refills: 0 | Status: COMPLETED | OUTPATIENT
Start: 2021-04-08 | End: 2021-04-08

## 2021-04-08 RX ORDER — SODIUM CHLORIDE 9 MG/ML
1000 INJECTION, SOLUTION INTRAVENOUS
Refills: 0 | Status: DISCONTINUED | OUTPATIENT
Start: 2021-04-08 | End: 2021-04-08

## 2021-04-08 RX ORDER — HYDROMORPHONE HYDROCHLORIDE 2 MG/ML
0.5 INJECTION INTRAMUSCULAR; INTRAVENOUS; SUBCUTANEOUS
Refills: 0 | Status: DISCONTINUED | OUTPATIENT
Start: 2021-04-08 | End: 2021-04-08

## 2021-04-08 RX ORDER — LINEZOLID 600 MG/300ML
600 INJECTION, SOLUTION INTRAVENOUS ONCE
Refills: 0 | Status: COMPLETED | OUTPATIENT
Start: 2021-04-08 | End: 2021-04-08

## 2021-04-08 RX ORDER — CHLORHEXIDINE GLUCONATE 213 G/1000ML
1 SOLUTION TOPICAL
Refills: 0 | Status: DISCONTINUED | OUTPATIENT
Start: 2021-04-08 | End: 2021-04-09

## 2021-04-08 RX ORDER — METRONIDAZOLE 500 MG
TABLET ORAL
Refills: 0 | Status: DISCONTINUED | OUTPATIENT
Start: 2021-04-08 | End: 2021-04-08

## 2021-04-08 RX ORDER — CEFEPIME 1 G/1
INJECTION, POWDER, FOR SOLUTION INTRAMUSCULAR; INTRAVENOUS
Refills: 0 | Status: DISCONTINUED | OUTPATIENT
Start: 2021-04-08 | End: 2021-04-08

## 2021-04-08 RX ORDER — METRONIDAZOLE 500 MG
500 TABLET ORAL EVERY 8 HOURS
Refills: 0 | Status: DISCONTINUED | OUTPATIENT
Start: 2021-04-08 | End: 2021-04-09

## 2021-04-08 RX ORDER — INSULIN HUMAN 100 [IU]/ML
6 INJECTION, SOLUTION SUBCUTANEOUS
Qty: 100 | Refills: 0 | Status: DISCONTINUED | OUTPATIENT
Start: 2021-04-08 | End: 2021-04-08

## 2021-04-08 RX ORDER — INSULIN HUMAN 100 [IU]/ML
9 INJECTION, SOLUTION SUBCUTANEOUS
Qty: 100 | Refills: 0 | Status: DISCONTINUED | OUTPATIENT
Start: 2021-04-08 | End: 2021-04-08

## 2021-04-08 RX ORDER — INSULIN HUMAN 100 [IU]/ML
5 INJECTION, SOLUTION SUBCUTANEOUS
Qty: 100 | Refills: 0 | Status: DISCONTINUED | OUTPATIENT
Start: 2021-04-08 | End: 2021-04-08

## 2021-04-08 RX ORDER — LINEZOLID 600 MG/300ML
600 INJECTION, SOLUTION INTRAVENOUS EVERY 12 HOURS
Refills: 0 | Status: DISCONTINUED | OUTPATIENT
Start: 2021-04-08 | End: 2021-04-08

## 2021-04-08 RX ORDER — CEFEPIME 1 G/1
2000 INJECTION, POWDER, FOR SOLUTION INTRAMUSCULAR; INTRAVENOUS EVERY 12 HOURS
Refills: 0 | Status: DISCONTINUED | OUTPATIENT
Start: 2021-04-08 | End: 2021-04-08

## 2021-04-08 RX ORDER — ONDANSETRON 8 MG/1
4 TABLET, FILM COATED ORAL ONCE
Refills: 0 | Status: DISCONTINUED | OUTPATIENT
Start: 2021-04-08 | End: 2021-04-08

## 2021-04-08 RX ORDER — CEFEPIME 1 G/1
2000 INJECTION, POWDER, FOR SOLUTION INTRAMUSCULAR; INTRAVENOUS ONCE
Refills: 0 | Status: COMPLETED | OUTPATIENT
Start: 2021-04-08 | End: 2021-04-08

## 2021-04-08 RX ORDER — INSULIN HUMAN 100 [IU]/ML
12 INJECTION, SOLUTION SUBCUTANEOUS
Qty: 100 | Refills: 0 | Status: DISCONTINUED | OUTPATIENT
Start: 2021-04-08 | End: 2021-04-08

## 2021-04-08 RX ORDER — LINEZOLID 600 MG/300ML
INJECTION, SOLUTION INTRAVENOUS
Refills: 0 | Status: DISCONTINUED | OUTPATIENT
Start: 2021-04-08 | End: 2021-04-08

## 2021-04-08 RX ORDER — HEPARIN SODIUM 5000 [USP'U]/ML
5000 INJECTION INTRAVENOUS; SUBCUTANEOUS EVERY 8 HOURS
Refills: 0 | Status: DISCONTINUED | OUTPATIENT
Start: 2021-04-08 | End: 2021-04-08

## 2021-04-08 RX ORDER — ATORVASTATIN CALCIUM 80 MG/1
40 TABLET, FILM COATED ORAL AT BEDTIME
Refills: 0 | Status: DISCONTINUED | OUTPATIENT
Start: 2021-04-08 | End: 2021-04-09

## 2021-04-08 RX ORDER — INSULIN HUMAN 100 [IU]/ML
14 INJECTION, SOLUTION SUBCUTANEOUS
Qty: 100 | Refills: 0 | Status: DISCONTINUED | OUTPATIENT
Start: 2021-04-08 | End: 2021-04-09

## 2021-04-08 RX ORDER — CEFEPIME 1 G/1
INJECTION, POWDER, FOR SOLUTION INTRAMUSCULAR; INTRAVENOUS
Refills: 0 | Status: COMPLETED | OUTPATIENT
Start: 2021-04-08 | End: 2021-04-09

## 2021-04-08 RX ORDER — HEPARIN SODIUM 5000 [USP'U]/ML
5000 INJECTION INTRAVENOUS; SUBCUTANEOUS EVERY 8 HOURS
Refills: 0 | Status: DISCONTINUED | OUTPATIENT
Start: 2021-04-08 | End: 2021-04-09

## 2021-04-08 RX ORDER — OXYCODONE AND ACETAMINOPHEN 5; 325 MG/1; MG/1
1 TABLET ORAL ONCE
Refills: 0 | Status: DISCONTINUED | OUTPATIENT
Start: 2021-04-08 | End: 2021-04-08

## 2021-04-08 RX ORDER — INSULIN HUMAN 100 [IU]/ML
3 INJECTION, SOLUTION SUBCUTANEOUS
Qty: 100 | Refills: 0 | Status: DISCONTINUED | OUTPATIENT
Start: 2021-04-08 | End: 2021-04-08

## 2021-04-08 RX ORDER — METRONIDAZOLE 500 MG
500 TABLET ORAL EVERY 8 HOURS
Refills: 0 | Status: DISCONTINUED | OUTPATIENT
Start: 2021-04-08 | End: 2021-04-08

## 2021-04-08 RX ORDER — INSULIN HUMAN 100 [IU]/ML
7 INJECTION, SOLUTION SUBCUTANEOUS
Qty: 100 | Refills: 0 | Status: DISCONTINUED | OUTPATIENT
Start: 2021-04-08 | End: 2021-04-08

## 2021-04-08 RX ORDER — SODIUM CHLORIDE 9 MG/ML
1000 INJECTION, SOLUTION INTRAVENOUS
Refills: 0 | Status: DISCONTINUED | OUTPATIENT
Start: 2021-04-08 | End: 2021-04-09

## 2021-04-08 RX ORDER — LINEZOLID 600 MG/300ML
600 INJECTION, SOLUTION INTRAVENOUS EVERY 12 HOURS
Refills: 0 | Status: DISCONTINUED | OUTPATIENT
Start: 2021-04-08 | End: 2021-04-09

## 2021-04-08 RX ADMIN — HEPARIN SODIUM 5000 UNIT(S): 5000 INJECTION INTRAVENOUS; SUBCUTANEOUS at 21:23

## 2021-04-08 RX ADMIN — Medication 100 MILLIGRAM(S): at 05:22

## 2021-04-08 RX ADMIN — Medication 100 MILLIGRAM(S): at 21:22

## 2021-04-08 RX ADMIN — CEFEPIME 100 MILLIGRAM(S): 1 INJECTION, POWDER, FOR SOLUTION INTRAMUSCULAR; INTRAVENOUS at 20:42

## 2021-04-08 RX ADMIN — MEROPENEM 100 MILLIGRAM(S): 1 INJECTION INTRAVENOUS at 05:22

## 2021-04-08 RX ADMIN — CEFEPIME 100 MILLIGRAM(S): 1 INJECTION, POWDER, FOR SOLUTION INTRAMUSCULAR; INTRAVENOUS at 09:51

## 2021-04-08 RX ADMIN — Medication 100 MILLIGRAM(S): at 10:40

## 2021-04-08 RX ADMIN — ATORVASTATIN CALCIUM 40 MILLIGRAM(S): 80 TABLET, FILM COATED ORAL at 21:22

## 2021-04-08 RX ADMIN — HEPARIN SODIUM 5000 UNIT(S): 5000 INJECTION INTRAVENOUS; SUBCUTANEOUS at 05:22

## 2021-04-08 RX ADMIN — Medication 1 APPLICATION(S): at 06:35

## 2021-04-08 RX ADMIN — LINEZOLID 300 MILLIGRAM(S): 600 INJECTION, SOLUTION INTRAVENOUS at 11:37

## 2021-04-08 RX ADMIN — LINEZOLID 300 MILLIGRAM(S): 600 INJECTION, SOLUTION INTRAVENOUS at 20:42

## 2021-04-08 RX ADMIN — SODIUM CHLORIDE 250 MILLILITER(S): 9 INJECTION, SOLUTION INTRAVENOUS at 21:22

## 2021-04-08 RX ADMIN — CHLORHEXIDINE GLUCONATE 1 APPLICATION(S): 213 SOLUTION TOPICAL at 05:22

## 2021-04-08 NOTE — CONSULT NOTE ADULT - SUBJECTIVE AND OBJECTIVE BOX
HPI:  60 years old male known to have uncontrolled DM2, diabetic retinopathy and neuropathy, HTN, morbid obesity, CKD 3, who called EMS today after falling at home yesterday and was unable to get up.     Pt on my examination is Alert and awake but does not recall the events leading to hosp adm. History obtained from ED staff and charts.  He was noted in the ED to be tachypneic with increased respiratory effort. In the ED the FS was initially undetectable, then > 600. Labs grossly deranged, including glucose of 687, WBC of 19.2, hyponatremic to 129, BUN/Cr 38/2.2. Beta-hydroxybuturate >9, consistent with DKA. HCO3 8 > 3 s/p 3 bicarbonate iv pushes, started on bicarbonate drip. ABGs showed PH 7.17 > 7.08. Pt received 3 L NS.    The ED was unable to obtain a temperature on arrival, he was bradycardic to 50 bpm, 130/70, saturating 95% on 6L NC initially but on my examination pt was > 92% on RA. His exam is notable for erythema of the groin below the panus as well as on other side of the penis and scrotum. Scrotum is indurated. There are numerous areas of skin excoriation with what appears to be purulent discharge. CT scan demonstrated edema/inflammatory changes in the soft tissues of the anterior pelvis involving the left groin and pubic region. Small 2.1 cm fluid pocket along the skin surface. No soft tissue gas identified. Patient was evaluated by Urology in the ED, team cultured areas of drainage. Differential from their standpoint is Fourniers vs. hidradenitis. Surgery Dr Hopkins evaluated the pt and recs it is superficial cellulitis and to Rx with iv abx.  could not do the medrec with the pt, done as per Dr Cormier's outpt notes (07 Apr 2021 18:20)      Hospital course is complicated by DKA and sepsis from infected hematoma vs soft tissue infection of groin. Endocrine eval is requested for management of uncontrolled DM and DKA.    PAST MEDICAL & SURGICAL HISTORY  Diabetes mellitus    Dyslipidemia    Hypertension    No significant past surgical history        FAMILY HISTORY:  FAMILY HISTORY:      SOCIAL HISTORY:  []smoker  []Alcohol  []Drug    ALLERGIES:  No Known Allergies      MEDICATIONS:  MEDICATIONS  (STANDING):  atorvastatin 40 milliGRAM(s) Oral at bedtime  cefepime   IVPB      cefepime   IVPB 2000 milliGRAM(s) IV Intermittent every 12 hours  chlorhexidine 4% Liquid 1 Application(s) Topical <User Schedule>  clotrimazole 1% Cream 1 Application(s) Topical two times a day  dextrose 5% + sodium chloride 0.45%. 1000 milliLiter(s) (250 mL/Hr) IV Continuous <Continuous>  heparin   Injectable 5000 Unit(s) SubCutaneous every 12 hours  insulin regular Infusion 5 Unit(s)/Hr (5 mL/Hr) IV Continuous <Continuous>  linezolid  IVPB 600 milliGRAM(s) IV Intermittent once  linezolid  IVPB 600 milliGRAM(s) IV Intermittent every 12 hours  linezolid  IVPB      metroNIDAZOLE  IVPB 500 milliGRAM(s) IV Intermittent every 8 hours  metroNIDAZOLE  IVPB        MEDICATIONS  (PRN):      HOME MEDICATIONS:  Home Medications:  atorvastatin 40 mg oral tablet: 1 tab(s) orally once a day (07 Apr 2021 19:05)  gabapentin 600 mg oral tablet: 1 tab(s) orally 3 times a day (07 Apr 2021 19:05)  Jardiance 10 mg oral tablet: 1 tab(s) orally once a day (in the morning) (07 Apr 2021 19:05)  lisinopril 20 mg oral tablet: 1 tab(s) orally once a day (07 Apr 2021 19:05)  pioglitazone 30 mg oral tablet: 1 tab(s) orally once a day (07 Apr 2021 19:05)  Victoza 18 mg/3 mL subcutaneous solution:  (07 Apr 2021 19:05)      VITALS:   T(F): 97 (04-08 @ 07:41), Max: 97.8 (04-08 @ 04:00)  HR: 86 (04-08 @ 10:00) (50 - 108)  BP: 93/54 (04-08 @ 10:00) (84/50 - 131/61)  BP(mean): 61 (04-08 @ 10:00) (58 - 78)  RR: 16 (04-08 @ 10:00) (13 - 36)  SpO2: 100% (04-08 @ 10:00) (94% - 100%)    I&O's Summary    07 Apr 2021 07:01  -  08 Apr 2021 07:00  --------------------------------------------------------  IN: 1651 mL / OUT: 0 mL / NET: 1651 mL    08 Apr 2021 07:01  -  08 Apr 2021 11:22  --------------------------------------------------------  IN: 768 mL / OUT: 600 mL / NET: 168 mL        REVIEW OF SYSTEMS:  CONSTITUTIONAL: No weakness, fevers or chills  EYES: No visual changes  ENT: No vertigo or throat pain   NECK: No pain or stiffness  RESPIRATORY: No cough, wheezing, hemoptysis; No shortness of breath  CARDIOVASCULAR: No chest pain or palpitations  GASTROINTESTINAL: + nausea and abdominal pain; No diarrhea or constipation. No melena or hematochezia.  GENITOURINARY: + Polyuria  NEUROLOGICAL:  No tremors, no Weakness or numbness  SKIN: see HPI  MSK: no joint pain    PHYSICAL EXAM:  GENERAL: Patient is awake , alert and oriented,  not in acute distress  EYES: No proptosis, no lid lag  NECK: No thyroid enlargement, no palpable nodules , no bruit  LUNGS: Clear to auscultation bilaterally   CARDIOVASCULAR: S1/S2 present, RRR , no murmurs or rubs  ABD: Soft, non-tender, non-distended, +BS  EXT: No ELOISA  SKIN: Pubic area with edema/erythema/hyerpigmentation, drainage, fluctuans. Scrotum erythema.   NEURO: No tremors, DTR 2+    LABS:                        13.5   20.51 )-----------( 385      ( 08 Apr 2021 04:53 )             39.3     04-08    143  |  106  |  48<H>  ----------------------------<  102<H>  3.7   |  19  |  2.2<H>    Ca    9.2      08 Apr 2021 04:53  Phos  2.4     04-08  Mg     2.2     04-08    TPro  7.6  /  Alb  4.4  /  TBili  0.6  /  DBili  x   /  AST  13  /  ALT  19  /  AlkPhos  168<H>  04-07    PT/INR - ( 07 Apr 2021 11:45 )   PT: 11.20 sec;   INR: 0.97 ratio         PTT - ( 07 Apr 2021 11:45 )  PTT:31.1 sec  Creatine Kinase, Serum: 330 U/L *H* (04-07-21 @ 21:00)  Troponin T, Serum: <0.01 ng/mL (04-07-21 @ 21:00)  Troponin T, Serum: 0.01 ng/mL (04-07-21 @ 11:45)    CARDIAC MARKERS ( 07 Apr 2021 21:00 )  x     / <0.01 ng/mL / 330 U/L / x     / 13.5 ng/mL  CARDIAC MARKERS ( 07 Apr 2021 11:45 )  x     / 0.01 ng/mL / x     / x     / x          04-08 Chol 129 LDL -- HDL 49 Trig 157<H>  POCT Blood Glucose.: 187 mg/dL (04-08-21 @ 09:55)  POCT Blood Glucose.: 221 mg/dL (04-08-21 @ 08:23)  POCT Blood Glucose.: 157 mg/dL (04-08-21 @ 07:29)  POCT Blood Glucose.: 162 mg/dL (04-08-21 @ 06:30)  POCT Blood Glucose.: 115 mg/dL (04-08-21 @ 05:29)  POCT Blood Glucose.: 118 mg/dL (04-08-21 @ 04:02)  POCT Blood Glucose.: 68 mg/dL (04-08-21 @ 03:09)  POCT Blood Glucose.: 76 mg/dL (04-08-21 @ 02:04)  POCT Blood Glucose.: 143 mg/dL (04-08-21 @ 00:47)  POCT Blood Glucose.: 208 mg/dL (04-08-21 @ 00:11)  POCT Blood Glucose.: 324 mg/dL (04-07-21 @ 20:42)  POCT Blood Glucose.: 372 mg/dL (04-07-21 @ 19:59)  POCT Blood Glucose.: 485 mg/dL (04-07-21 @ 18:51)  POCT Blood Glucose.: >600 mg/dL (04-07-21 @ 17:38)  POCT Blood Glucose.: >600 mg/dL (04-07-21 @ 16:42)  POCT Blood Glucose.: >600 mg/dL (04-07-21 @ 15:15)

## 2021-04-08 NOTE — PROGRESS NOTE ADULT - SUBJECTIVE AND OBJECTIVE BOX
Hospital Day:  1d    Chief Complaint: Patient is a 60y old  Male who presents with a chief complaint of DKA (08 Apr 2021 13:54)    24 hour events: No acute overnight events. Patient seen this AM resting comfortably in bed.     Past Medical Hx:   Diabetes    Diabetes mellitus    Dyslipidemia    Hypertension      Past Sx:  No significant past surgical history      Allergies:  No Known Allergies    Current Meds:   Standing Meds:    PRN Meds:    HOME MEDICATIONS:  atorvastatin 40 mg oral tablet: 1 tab(s) orally once a day  gabapentin 600 mg oral tablet: 1 tab(s) orally 3 times a day  Jardiance 10 mg oral tablet: 1 tab(s) orally once a day (in the morning)  lisinopril 20 mg oral tablet: 1 tab(s) orally once a day  pioglitazone 30 mg oral tablet: 1 tab(s) orally once a day  Victoza 18 mg/3 mL subcutaneous solution:       Vital Signs:   T(F): 98 (04-08-21 @ 13:05), Max: 98 (04-08-21 @ 12:00)  HR: 85 (04-08-21 @ 13:45) (80 - 108)  BP: 99/61 (04-08-21 @ 13:38) (84/50 - 131/61)  RR: 17 (04-08-21 @ 13:45) (13 - 28)  SpO2: 100% (04-08-21 @ 13:45) (94% - 100%)      04-07-21 @ 07:01  -  04-08-21 @ 07:00  --------------------------------------------------------  IN: 1651 mL / OUT: 0 mL / NET: 1651 mL    04-08-21 @ 07:01  -  04-08-21 @ 15:00  --------------------------------------------------------  IN: 1536 mL / OUT: 600 mL / NET: 936 mL    Physical Exam:   GENERAL: well nourished male in NAD  HEENT: NCAT  CHEST/LUNG: audible breath sounds b/l  HEART: Regular rate and rhythm; s1 s2 appreciated  ABDOMEN: Soft, Nontender, Nondistended abdomen   EXTREMITIES: No LE edema b/l  SKIN: cellulitis? of L groin  NERVOUS SYSTEM:  Alert & Oriented X4    Labs:                         13.5   20.51 )-----------( 385      ( 08 Apr 2021 04:53 )             39.3     Neutophil% 87.6, Lymphocyte% 2.8, Monocyte% 8.9, Bands% 0.6 04-08-21 @ 04:53    08 Apr 2021 11:15    141    |  107    |  41     ----------------------------<  205    3.7     |  20     |  1.9      Ca    8.6        08 Apr 2021 11:15  Phos  2.4       08 Apr 2021 04:53  Mg     2.2       08 Apr 2021 04:53    TPro  7.6    /  Alb  4.4    /  TBili  0.6    /  DBili  x      /  AST  13     /  ALT  19     /  AlkPhos  168    07 Apr 2021 15:50      Chol 129, LDL --, HDL 49, VLDL --,  04-08-21 @ 04:53        Serum Pro-Brain Natriuretic Peptide: 217 pg/mL (04-07-21 @ 11:45)    Troponin <0.01, CKMB 13.5,  04-07-21 @ 21:00  Troponin 0.01, CKMB --, CK -- 04-07-21 @ 11:45    Radiology:  < from: [facility] Duplex Lower Ext Vein Scan, Bilat (04.07.21 @ 19:38) >  IMPRESSION:  No evidence of deep venous thrombosis in either lower extremity.    < end of copied text >    < from: CT Abdomen and Pelvis w/ IV Cont (04.07.21 @ 16:05) >  IMPRESSION:    Edema/inflammatory changes in the soft tissues of the anterior pelvis involving the left groin and pubic region. Small 2.1 cm fluid pocket along the skin surface. No soft tissue gas identified.      < end of copied text >     Hospital Day:  1d    Chief Complaint: Patient is a 60y old  Male who presents with a chief complaint of DKA (08 Apr 2021 13:54)    24 hour events: No acute overnight events. Patient seen this AM resting comfortably in bed.     Past Medical Hx:   Diabetes    Diabetes mellitus    Dyslipidemia    Hypertension      Past Sx:  No significant past surgical history      Allergies:  No Known Allergies    Current Meds:   Standing Meds:    PRN Meds:    HOME MEDICATIONS:  atorvastatin 40 mg oral tablet: 1 tab(s) orally once a day  gabapentin 600 mg oral tablet: 1 tab(s) orally 3 times a day  Jardiance 10 mg oral tablet: 1 tab(s) orally once a day (in the morning)  lisinopril 20 mg oral tablet: 1 tab(s) orally once a day  pioglitazone 30 mg oral tablet: 1 tab(s) orally once a day  Victoza 18 mg/3 mL subcutaneous solution:       Vital Signs:   T(F): 98 (04-08-21 @ 13:05), Max: 98 (04-08-21 @ 12:00)  HR: 85 (04-08-21 @ 13:45) (80 - 108)  BP: 99/61 (04-08-21 @ 13:38) (84/50 - 131/61)  RR: 17 (04-08-21 @ 13:45) (13 - 28)  SpO2: 100% (04-08-21 @ 13:45) (94% - 100%)      04-07-21 @ 07:01  -  04-08-21 @ 07:00  --------------------------------------------------------  IN: 1651 mL / OUT: 0 mL / NET: 1651 mL    04-08-21 @ 07:01  -  04-08-21 @ 15:00  --------------------------------------------------------  IN: 1536 mL / OUT: 600 mL / NET: 936 mL    Physical Exam:   GENERAL: well nourished male in NAD  HEENT: NCAT  CHEST/LUNG: audible breath sounds b/l  HEART: Regular rate and rhythm; s1 s2 appreciated  ABDOMEN: Soft, Nontender, Nondistended abdomen   EXTREMITIES: No LE edema b/l  SKIN: infected abscess of L groin  NERVOUS SYSTEM:  Alert & Oriented X4    Labs:                         13.5   20.51 )-----------( 385      ( 08 Apr 2021 04:53 )             39.3     Neutophil% 87.6, Lymphocyte% 2.8, Monocyte% 8.9, Bands% 0.6 04-08-21 @ 04:53    08 Apr 2021 11:15    141    |  107    |  41     ----------------------------<  205    3.7     |  20     |  1.9      Ca    8.6        08 Apr 2021 11:15  Phos  2.4       08 Apr 2021 04:53  Mg     2.2       08 Apr 2021 04:53    TPro  7.6    /  Alb  4.4    /  TBili  0.6    /  DBili  x      /  AST  13     /  ALT  19     /  AlkPhos  168    07 Apr 2021 15:50      Chol 129, LDL --, HDL 49, VLDL --,  04-08-21 @ 04:53        Serum Pro-Brain Natriuretic Peptide: 217 pg/mL (04-07-21 @ 11:45)    Troponin <0.01, CKMB 13.5,  04-07-21 @ 21:00  Troponin 0.01, CKMB --, CK -- 04-07-21 @ 11:45    Radiology:  < from: [facility] Duplex Lower Ext Vein Scan, Bilat (04.07.21 @ 19:38) >  IMPRESSION:  No evidence of deep venous thrombosis in either lower extremity.    < end of copied text >    < from: CT Abdomen and Pelvis w/ IV Cont (04.07.21 @ 16:05) >  IMPRESSION:    Edema/inflammatory changes in the soft tissues of the anterior pelvis involving the left groin and pubic region. Small 2.1 cm fluid pocket along the skin surface. No soft tissue gas identified.      < end of copied text >

## 2021-04-08 NOTE — PROGRESS NOTE ADULT - ASSESSMENT
60 year old male with uncontrolled DM2, diabetic retinopathy, and neuropathy, HTN, morbid obesity,     # Metabolic acidosis secondary to DKA   PH 7.17 > 7.08  HCO3 8 > 5  cw insulin drip   with Potassium 40 meq  100 meq bicarb push then continue with the bicarb drip  fu stat bmp  repeat ABGs around 8 pm  NPO for now  FS q1 for now  fu lytes and replete      # Superficial purulent cellulitis vs Hydradenitis left groin and pubic area  fu blood cultures  fu abscess cultures  fu mrsa  cw vanco and clinda for now  fu ID eval      # Hyponatremia  Na corrected for Glucose ia 141  likely secondary to hyperglycemia  fu repeat bmp, monitor for now    # HTN  holding home meds for now      # CKD  avoid nephrotoxins      # Obesity          DVT PPx: heparin  GI ppx: not indicated  Diet: npo for now  Activity: bedrest             60 year old male with uncontrolled DM2, diabetic retinopathy, and neuropathy, HTN, morbid obesity,     # Metabolic acidosis secondary to DKA   - PH 7.17 > 7.08, HCO3 8 > 5  - B-OH>9.0   - HbA1c >15.5% possible medication noncompliance. follows with Dr. Vick OP   - trop negative x2  - c/w insulin drip and D5+halfNS for now. FS q1h   - Follow up lytes and replete  - Follow up Endocrinology cst     # Necrotizing pubic abscess, sepsis present on admission  - May have contributed in being a trigger for DKA.   - ID evaluated the patient, started on Zyvox 600mg IV q12h, Cefepime 2g IV q12h, Flagyl 500mg q8h.  - Burn taking the patient for surgical debridement today. NPO for procedure, keep insulin gtt for now.   - Follow up blood cultures  - Follow up abscess cultures  - Follow up mrsa swab    # pseudo hyponatremia on admission  - Na corrected for Glucose was 141 likely secondary to hyperglycemia  - monitor for now    # HTN - BP low, holding home meds for now    # VALDEZ on CKD - improving Cr trending down with fluids   - avoid nephrotoxins    # Obesity    DVT PPx: heparin sq  GI ppx: not indicated  Diet: npo for now  Activity: bedrest

## 2021-04-08 NOTE — PROGRESS NOTE ADULT - SUBJECTIVE AND OBJECTIVE BOX
GENERAL SURGERY PROGRESS NOTE     THOMAS HARDIN  60y  Male  Hospital day :1d      EVENTS IN THE LAST 24 HRS: Patient upgraded, on insulin drip, refers pain in anterior pelvis and groin area. with improvement of glucose levels      T(F): 97.8 (04-08-21 @ 04:00), Max: 97.8 (04-08-21 @ 04:00)  HR: 80 (04-08-21 @ 06:00) (50 - 108)  BP: 93/44 (04-08-21 @ 06:00) (84/50 - 131/61)  RR: 13 (04-08-21 @ 06:00) (13 - 36)  SpO2: 100% (04-08-21 @ 06:00) (94% - 100%)      DIET/FLUIDS: dextrose 5% + sodium chloride 0.45%. 1000 milliLiter(s) IV Continuous <Continuous>      URINE:    Indwelling Urethral Catheter:     Connect To:  Straight Drainage/Gravity    Indication:  Urine Output Monitoring in Critically Ill (04-07-21 @ 11:53)    GI proph:    AC/ proph: heparin   Injectable 5000 Unit(s) SubCutaneous every 12 hours    ABx: clindamycin IVPB 600 milliGRAM(s) IV Intermittent every 8 hours  meropenem  IVPB 500 milliGRAM(s) IV Intermittent every 12 hours  vancomycin  IVPB 1500 milliGRAM(s) IV Intermittent every 24 hours    LABS  Labs:  CAPILLARY BLOOD GLUCOSE      POCT Blood Glucose.: 162 mg/dL (08 Apr 2021 06:30)  POCT Blood Glucose.: 115 mg/dL (08 Apr 2021 05:29)  POCT Blood Glucose.: 118 mg/dL (08 Apr 2021 04:02)  POCT Blood Glucose.: 68 mg/dL (08 Apr 2021 03:09)  POCT Blood Glucose.: 76 mg/dL (08 Apr 2021 02:04)  POCT Blood Glucose.: 143 mg/dL (08 Apr 2021 00:47)  POCT Blood Glucose.: 208 mg/dL (08 Apr 2021 00:11)  POCT Blood Glucose.: 324 mg/dL (07 Apr 2021 20:42)  POCT Blood Glucose.: 372 mg/dL (07 Apr 2021 19:59)  POCT Blood Glucose.: 485 mg/dL (07 Apr 2021 18:51)  POCT Blood Glucose.: >600 mg/dL (07 Apr 2021 17:38)  POCT Blood Glucose.: >600 mg/dL (07 Apr 2021 16:42)  POCT Blood Glucose.: >600 mg/dL (07 Apr 2021 15:15)                          13.5   20.51 )-----------( 385      ( 08 Apr 2021 04:53 )             39.3       Auto Neutrophil %: 87.6 % (04-08-21 @ 04:53)  Auto Immature Granulocyte %: 0.6 % (04-08-21 @ 04:53)  Auto Neutrophil %: 89.0 % (04-07-21 @ 11:45)  Auto Immature Granulocyte %: 0.7 % (04-07-21 @ 11:45)    04-08    143  |  106  |  48<H>  ----------------------------<  102<H>  3.7   |  19  |  2.2<H>      Calcium, Total Serum: 9.2 mg/dL (04-08-21 @ 04:53)      LFTs:             7.6  | 0.6  | 13       ------------------[168     ( 07 Apr 2021 15:50 )  4.4  | x    | 19          Lipase:x      Amylase:x         Blood Gas Venous - Lactate: 4.1 mmoL/L (04-07-21 @ 17:36)  Blood Gas Venous - Lactate: 4.4 mmoL/L (04-07-21 @ 15:23)  Blood Gas Venous - Lactate: 2.3 mmoL/L (04-07-21 @ 11:54)      Coags:     11.20  ----< 0.97    ( 07 Apr 2021 11:45 )     31.1        CARDIAC MARKERS ( 07 Apr 2021 21:00 )  x     / <0.01 ng/mL / 330 U/L / x     / 13.5 ng/mL  CARDIAC MARKERS ( 07 Apr 2021 11:45 )  x     / 0.01 ng/mL / x     / x     / x          Serum Pro-Brain Natriuretic Peptide: 217 pg/mL (04-07-21 @ 11:45)

## 2021-04-08 NOTE — CONSULT NOTE ADULT - SUBJECTIVE AND OBJECTIVE BOX
60y  Male  HPI:  60 years old male known to have uncontrolled DM2, diabetic retinopathy and neuropathy, HTN, morbid obesity, CKD 3, who called EMS today after falling at home yesterday and was unable to get up.     Pt on my examination is Alert and awake but does not recall the events leading to hosp adm. History obtained from ED staff and charts.  He was noted in the ED to be tachypneic with increased respiratory effort. In the ED the FS was initially undetectable, then > 600. Labs grossly deranged, including glucose of 687, WBC of 19.2, hyponatremic to 129, BUN/Cr 38/2.2. Beta-hydroxybuturate >9, consistent with DKA. HCO3 8 > 3 s/p 3 bicarbonate iv pushes, started on bicarbonate drip. ABGs showed PH 7.17 > 7.08. Pt received 3 L NS.    The ED was unable to obtain a temperature on arrival, he was bradycardic to 50 bpm, 130/70, saturating 95% on 6L NC initially but on my examination pt was > 92% on RA. His exam is notable for erythema of the groin below the panus as well as on other side of the penis and scrotum. Scrotum is indurated. There are numerous areas of skin excoriation with what appears to be purulent discharge. CT scan demonstrated edema/inflammatory changes in the soft tissues of the anterior pelvis involving the left groin and pubic region. Small 2.1 cm fluid pocket along the skin surface. No soft tissue gas identified. Patient was evaluated by Urology in the ED, team cultured areas of drainage. Differential from their standpoint is Fourniers vs. hidradenitis. Surgery Dr Hopkins evaluated the pt and recs it is superficial cellulitis and to Rx with iv abx.  could not do the medrec with the pt, done as per Dr Cormier's outpt notes (07 Apr 2021 18:20)    ----------------------------    Burn consulted to evaluate pubic abscess. Patient admitted for DKA, found to have abscess.     Allergies    No Known Allergies        PAST MEDICAL & SURGICAL HISTORY:  Diabetes mellitus    Dyslipidemia    Hypertension    No significant past surgical history      Exam:  General: NAD, lying in bed.   Resp: on RA, breathing comfortably  Neuro: awake, alert  Wound:  Mons pubis - erythematous, swollen, indurated with some fluctuance, there are 2 open necrotic and purulent wounds ~2x2cm each , tender to palpation

## 2021-04-08 NOTE — CONSULT NOTE ADULT - ASSESSMENT
60 years old male known to have uncontrolled DM2, diabetic retinopathy and neuropathy, HTN, morbid obesity, CKD 3, who called EMS today after falling at home yesterday and was unable to get up.   He was noted in the ED to be tachypneic with increased respiratory effort. In the ED the FS was initially undetectable, then > 600. Labs grossly deranged, including glucose of 687, WBC of 19.2, hyponatremic to 129, BUN/Cr 38/2.2. Beta-hydroxybuturate >9, consistent with DKA. HCO3 8 > 3 s/p 3 bicarbonate iv pushes, started on bicarbonate drip. ABGs showed PH 7.17 > 7.08. Pt received 3 L NS.  The ED was unable to obtain a temperature on arrival, he was bradycardic to 50 bpm, 130/70, saturating 95% on 6L NC.  CT scan demonstrated edema/inflammatory changes in the soft tissues of the anterior pelvis involving the left groin and pubic region. Small 2.1 cm fluid pocket along the skin surface. No soft tissue gas identified.     IMPRESSION;  Sepsis secondary to a necrotising infection ( infected hematoma ) pubic area  Not just cellulitis  Not hydradenitis  Scotum is erythematous but not infected  Perineum with no infection    RECOMMENDATIONS;  Surgical drainage with deep tissue cultures today  BCx  Zyvox 600 mg iv q12h  Cefepime  2 gm iv q12h  Flagyl 500 mg iv q8h

## 2021-04-08 NOTE — PROGRESS NOTE ADULT - ASSESSMENT
1. DKA/ DM: secondary to most likely groin cellulitis, tx insulin gtt, monitor for now, f/u BMP once AG closes will convert to long acting insulin.    2. Groin Cellulitis: tx empiric abx as per ID, will f/u Surgery pt will go to OR for debridement continue supportive care, monitor for now.    3. VALDEZ on CKD: secondary to dehydration tx IVF;s monitor for now.    4. HTN: continue supportive care, monitor for now.    5. HLD: tx statin.    6. Morbid Obesity: continue supportive care, monitor for now.    7. DVT px: tx hep subcut, check lower ext US.    8. NUT: keep NPO tx IVF's

## 2021-04-08 NOTE — PRE-ANESTHESIA EVALUATION ADULT - NSANTHADDINFOFT_GEN_ALL_CORE
patient on DKA on insulin drip   morbid obese   discussed with Dr Mckeon case is emergency has to be done

## 2021-04-08 NOTE — CONSULT NOTE ADULT - ASSESSMENT
60 years old male known to have uncontrolled DM2, diabetic retinopathy and neuropathy, HTN, morbid obesity, CKD 3, who called EMS today after falling at home found to have DKA and sepsis from necrotising infected hematoma in groin vs abscess. Endo eval requested for uncontrolled DM and DKA.    # DKA likely in the setting of sepsis from infected groin hematoma vs abscess  - check HbA1c  - c/w insulin drip for now until AG is <12 with HCO3 >15 or VBG PH>7.3  - Bridge to s/c insulin once off insulin drip with Lantus   units TID pre-meals with correctional sliding scale  - monitor electrolytes every 4 hours and replete potassium as per DKA protocol  - check fingerstick glucose every hour and avoid hypoglycemia  - check serial BMPs and monitor acidosis and AG  - c/w I/V ABX and surgery/burn eval for abscess/hematoma drainage    Will discuss with attending 60 years old male known to have uncontrolled DM2, diabetic retinopathy and neuropathy, HTN, morbid obesity, CKD 3, who called EMS after falling at home found to have DKA and sepsis from necrotising infected hematoma in groin vs abscess. Endo eval requested for uncontrolled DM and DKA.    # DKA likely in the setting of sepsis from infected groin hematoma vs abscess  - check HbA1c  - c/w insulin drip for now until AG is <12 with HCO3 >15 or VBG PH>7.3  - Bridge to s/c insulin once off insulin drip with Lantus 90 units qd and lispro 30 units TID pre-meals with correctional sliding scale  - monitor electrolytes every 4 hours and replete potassium as per DKA protocol  - check fingerstick glucose every hour and avoid hypoglycemia  - check serial BMPs and monitor acidosis and AG  - c/w I/V ABX and surgery/burn eval for abscess/hematoma drainage    Will discuss with attending 60 years old male known to have uncontrolled DM2, diabetic retinopathy and neuropathy, HTN, morbid obesity, CKD 3, who called EMS after falling at home found to have DKA and sepsis from necrotising infected hematoma in groin vs abscess. Endo eval requested for uncontrolled DM and DKA.    # DKA likely in the setting of sepsis from infected groin hematoma vs abscess  - HbA1c >15.5  - c/w insulin drip for now until AG is <12 with HCO3 >15 or VBG PH>7.3  - Bridge to s/c insulin once off insulin drip with Lantus 70 units qd and lispro 20 units TID pre-meals with correctional sliding scale with ISS 2:50  - monitor electrolytes every 4 hours and replete potassium as per DKA protocol, add potassium with I/V fluids  - check fingerstick glucose every hour and avoid hypoglycemia  - check serial BMPs and monitor acidosis and AG  - c/w I/V ABX and surgery/burn eval for abscess/hematoma drainage     60 years old male known to have uncontrolled DM2, diabetic retinopathy and neuropathy, HTN, morbid obesity, CKD 3, who called EMS after falling at home found to have DKA and sepsis from necrotising infected hematoma in groin vs abscess. Endo eval requested for uncontrolled DM and DKA.    # DKA likely in the setting of sepsis from infected groin hematoma vs abscess  - HbA1c >15.5  - c/w insulin drip for now until AG is <12 with HCO3 >15 or VBG PH>7.3  - Bridge to s/c insulin once off insulin drip with Lantus 70 units qd and lispro 20 units TID pre-meals with correctional sliding scale with ISS 2:50 for fingerstick > 150mg/dl  - monitor electrolytes every 4 hours and replete potassium as per DKA protocol, add potassium with I/V fluids  - check fingerstick glucose every hour and avoid hypoglycemia  - check serial BMPs and monitor acidosis and AG  - c/w I/V ABX and surgery/burn eval for abscess/hematoma drainage

## 2021-04-08 NOTE — CONSULT NOTE ADULT - GENITOURINARY COMMENTS
Pubic area with edema/erythema/hyerpigmentation, drainage, fluctuans. Scrotum erythema. Perineum unremarkable

## 2021-04-08 NOTE — CONSULT NOTE ADULT - ASSESSMENT
ASSESSMENT:  Pubic abscess    RECOMMENDATION:  Wound care - wash with soap and water. Apply xeroform, ABD pad.   Added on Surgical debridement today , possible OR tomorrow as well.   IV abx as indicated/ per ID.  Please repeat BMP.   Needs EKG.   Needs active T&S and confirmation.   Will follow.

## 2021-04-08 NOTE — CONSULT NOTE ADULT - SUBJECTIVE AND OBJECTIVE BOX
THOMAS HARDIN  60y, Male  Allergy: No Known Allergies      All historical available data reviewed.    HPI:  60 years old male known to have uncontrolled DM2, diabetic retinopathy and neuropathy, HTN, morbid obesity, CKD 3, who called EMS today after falling at home yesterday and was unable to get up.     Pt on my examination is Alert and awake but does not recall the events leading to hosp adm. History obtained from ED staff and charts.  He was noted in the ED to be tachypneic with increased respiratory effort. In the ED the FS was initially undetectable, then > 600. Labs grossly deranged, including glucose of 687, WBC of 19.2, hyponatremic to 129, BUN/Cr 38/2.2. Beta-hydroxybuturate >9, consistent with DKA. HCO3 8 > 3 s/p 3 bicarbonate iv pushes, started on bicarbonate drip. ABGs showed PH 7.17 > 7.08. Pt received 3 L NS.    The ED was unable to obtain a temperature on arrival, he was bradycardic to 50 bpm, 130/70, saturating 95% on 6L NC initially but on my examination pt was > 92% on RA. His exam is notable for erythema of the groin below the panus as well as on other side of the penis and scrotum. Scrotum is indurated. There are numerous areas of skin excoriation with what appears to be purulent discharge. CT scan demonstrated edema/inflammatory changes in the soft tissues of the anterior pelvis involving the left groin and pubic region. Small 2.1 cm fluid pocket along the skin surface. No soft tissue gas identified. Patient was evaluated by Urology in the ED, team cultured areas of drainage. Differential from their standpoint is Fourniers vs. hidradenitis. Surgery Dr Hopkins evaluated the pt and recs it is superficial cellulitis and to Rx with iv abx.  could not do the medrec with the pt, done as per Dr Cormier's outpt notes (07 Apr 2021 18:20)    ID called for cellulitis    FAMILY HISTORY:    PAST MEDICAL & SURGICAL HISTORY:  Diabetes mellitus    Dyslipidemia    Hypertension    No significant past surgical history          VITALS:  T(F): 97, Max: 97.8 (04-08-21 @ 04:00)  HR: 84  BP: 99/52  RR: 15Vital Signs Last 24 Hrs  T(C): 36.1 (08 Apr 2021 07:41), Max: 36.6 (08 Apr 2021 04:00)  T(F): 97 (08 Apr 2021 07:41), Max: 97.8 (08 Apr 2021 04:00)  HR: 84 (08 Apr 2021 07:41) (50 - 108)  BP: 99/52 (08 Apr 2021 07:41) (84/50 - 131/61)  BP(mean): 67 (08 Apr 2021 07:41) (58 - 78)  RR: 15 (08 Apr 2021 07:41) (13 - 36)  SpO2: 100% (08 Apr 2021 07:41) (94% - 100%)    TESTS & MEASUREMENTS:                        13.5   20.51 )-----------( 385      ( 08 Apr 2021 04:53 )             39.3     04-08    143  |  106  |  48<H>  ----------------------------<  102<H>  3.7   |  19  |  2.2<H>    Ca    9.2      08 Apr 2021 04:53  Phos  2.4     04-08  Mg     2.2     04-08    TPro  7.6  /  Alb  4.4  /  TBili  0.6  /  DBili  x   /  AST  13  /  ALT  19  /  AlkPhos  168<H>  04-07    LIVER FUNCTIONS - ( 07 Apr 2021 15:50 )  Alb: 4.4 g/dL / Pro: 7.6 g/dL / ALK PHOS: 168 U/L / ALT: 19 U/L / AST: 13 U/L / GGT: x                   RADIOLOGY & ADDITIONAL TESTS:  Personal review of radiological diagnostics performed  Echo and EKG results noted when applicable.     MEDICATIONS:  atorvastatin 40 milliGRAM(s) Oral at bedtime  chlorhexidine 4% Liquid 1 Application(s) Topical <User Schedule>  clindamycin IVPB 600 milliGRAM(s) IV Intermittent every 8 hours  clotrimazole 1% Cream 1 Application(s) Topical two times a day  dextrose 5% + sodium chloride 0.45%. 1000 milliLiter(s) IV Continuous <Continuous>  heparin   Injectable 5000 Unit(s) SubCutaneous every 12 hours  insulin regular Infusion 5 Unit(s)/Hr IV Continuous <Continuous>  meropenem  IVPB 500 milliGRAM(s) IV Intermittent every 12 hours  vancomycin  IVPB 1500 milliGRAM(s) IV Intermittent every 24 hours      ANTIBIOTICS:  clindamycin IVPB 600 milliGRAM(s) IV Intermittent every 8 hours  meropenem  IVPB 500 milliGRAM(s) IV Intermittent every 12 hours  vancomycin  IVPB 1500 milliGRAM(s) IV Intermittent every 24 hours

## 2021-04-08 NOTE — PROGRESS NOTE ADULT - ASSESSMENT
60M w/PMHx of uncontrolled DM2, diabetic retinopathy and neuropathy, HTN, morbid obesity, CKD 3, who called EMS today after falling at home yesterday and was unable to get up. Patient's clinical presentation and laboratory values consistent with DKA. Surgery consulted for erythema and induration of the groin.     Plan:   Monitor vitas  Pain control as needed  Strict  I/O  Continue IV antibiotics for cellulitic infection   DKA management, MICU monitoring   Continue IVF resuscitation

## 2021-04-08 NOTE — PATIENT PROFILE ADULT - OVER THE PAST TWO WEEKS, HAVE YOU FELT LITTLE INTEREST OR PLEASURE IN DOING THINGS?
no

## 2021-04-08 NOTE — CONSULT NOTE ADULT - ATTENDING COMMENTS
no clinical or radiologic evidence of soft tissue infection   superficial cellulitis   DKA   continue antibiotics   Glucose control   continue resuscitation   will f/u d/w ACS and Urology     vI
pubic assess--> will debride in OR today
I personally saw and examined the patient, reviewed the chart and available data. I discussed the situation with the patient and Urology PA as well as with the medical attending Dr. Sharp and spoke with the surgical resident requesting repeat exam by surgery this evening. I also reviewed and/or amended the note as necessary.    patient seen on the day in question. Note not reviewed and cosigned until now due to scheduling issues

## 2021-04-08 NOTE — PROGRESS NOTE ADULT - SUBJECTIVE AND OBJECTIVE BOX
pt with DKA, on insulin drip--> abscess pubis    pt requires emergent surgery --> abscess drainage and debridement     will proceed despite medical  problem

## 2021-04-08 NOTE — PROGRESS NOTE ADULT - SUBJECTIVE AND OBJECTIVE BOX
Patient is a 60y old  Male who presents with a chief complaint of DKA (08 Apr 2021 13:54)        Over Night Events:        ROS:     All ROS are negative except HPI         PHYSICAL EXAM    ICU Vital Signs Last 24 Hrs  T(C): 36.7 (08 Apr 2021 13:38), Max: 36.7 (08 Apr 2021 12:00)  T(F): 98 (08 Apr 2021 13:05), Max: 98 (08 Apr 2021 12:00)  HR: 85 (08 Apr 2021 13:45) (80 - 108)  BP: 99/61 (08 Apr 2021 13:38) (84/50 - 131/61)  BP(mean): 80 (08 Apr 2021 13:38) (58 - 80)  ABP: --  ABP(mean): --  RR: 17 (08 Apr 2021 13:45) (13 - 28)  SpO2: 100% (08 Apr 2021 13:45) (94% - 100%)      CONSTITUTIONAL:  Well nourished.  NAD    ENT:   Airway patent,   Mouth with normal mucosa.   No thrush    EYES:   Pupils equal,   Round and reactive to light.    CARDIAC:   Normal rate,   Regular rhythm.    No edema      Vascular:  Normal systolic impulse  No Carotid bruits    RESPIRATORY:   No wheezing  Bilateral BS  Normal chest expansion  Not tachypneic,  No use of accessory muscles    GASTROINTESTINAL:  Abdomen soft,   Non-tender,   No guarding,   + BS    MUSCULOSKELETAL:   Range of motion is not limited,  No clubbing, cyanosis    NEUROLOGICAL:   Alert and oriented   No motor  deficits.    SKIN:   Skin normal color for race,   Warm and dry and intact.   No evidence of rash.    PSYCHIATRIC:   Normal mood and affect.   No apparent risk to self or others.    HEMATOLOGICAL:  No cervical  lymphadenopathy.  no inguinal lymphadenopathy      04-07-21 @ 07:01  -  04-08-21 @ 07:00  --------------------------------------------------------  IN:    dextrose 5% + sodium chloride 0.45%: 1500 mL    Insulin: 9 mL    Insulin: 5 mL    Insulin: 28 mL    Insulin: 6 mL    Insulin: 3 mL    IV PiggyBack: 100 mL  Total IN: 1651 mL    OUT:  Total OUT: 0 mL    Total NET: 1651 mL      04-08-21 @ 07:01  -  04-08-21 @ 15:02  --------------------------------------------------------  IN:    dextrose 5% + sodium chloride 0.45%: 1500 mL    Insulin: 36 mL  Total IN: 1536 mL    OUT:    Voided (mL): 600 mL  Total OUT: 600 mL    Total NET: 936 mL          LABS:                            13.5   20.51 )-----------( 385      ( 08 Apr 2021 04:53 )             39.3                                               04-08    141  |  107  |  41<H>  ----------------------------<  205<H>  3.7   |  20  |  1.9<H>    Ca    8.6      08 Apr 2021 11:15  Phos  2.4     04-08  Mg     2.2     04-08    TPro  7.6  /  Alb  4.4  /  TBili  0.6  /  DBili  x   /  AST  13  /  ALT  19  /  AlkPhos  168<H>  04-07      PT/INR - ( 07 Apr 2021 11:45 )   PT: 11.20 sec;   INR: 0.97 ratio         PTT - ( 07 Apr 2021 11:45 )  PTT:31.1 sec                                           CARDIAC MARKERS ( 07 Apr 2021 21:00 )  x     / <0.01 ng/mL / 330 U/L / x     / 13.5 ng/mL  CARDIAC MARKERS ( 07 Apr 2021 11:45 )  x     / 0.01 ng/mL / x     / x     / x                                                LIVER FUNCTIONS - ( 07 Apr 2021 15:50 )  Alb: 4.4 g/dL / Pro: 7.6 g/dL / ALK PHOS: 168 U/L / ALT: 19 U/L / AST: 13 U/L / GGT: x                                                                                                                                       MEDICATIONS  (STANDING):    MEDICATIONS  (PRN):      New X-rays reviewed:                                                                                  ECHO    CXR interpreted by me:

## 2021-04-08 NOTE — PRE-OP CHECKLIST - SELECT TESTS ORDERED
287/BMP/CBC/CMP/INR/Type and Cross/Type and Screen/Urinalysis/EKG/CXR/POCT Blood Glucose 287,  at 1341 04/08/BMP/CBC/CMP/INR/Type and Cross/Type and Screen/Urinalysis/EKG/CXR/POCT Blood Glucose/COVID-19

## 2021-04-08 NOTE — CONSULT NOTE ADULT - TIME BILLING
wound eval
chart reviewed, patient was in OR , did not see him.  60 years old male known to have uncontrolled DM2, diabetic retinopathy and neuropathy, HTN, morbid obesity, CKD 3, who called EMS after falling at home found to have DKA and sepsis from necrotising infected hematoma in groin vs abscess. Endo eval requested for uncontrolled DM and DKA.  patient follows with Dr Cormier with ? compliance with medication.  likely DKA precipitated by recent infection and non compliance , no win OR remain on insulin drip.  - check another BMp once back from OR, if AG high continue with insulin drip , if Closed can bridge with Lantus 70 units and stop drip 3-4 hours after dose is given, start Lispro 20 units with meals once eating and ISS 2:50 >150.  replete K   will follow

## 2021-04-08 NOTE — PRE-OP CHECKLIST - 1.
Patient currently on insulin drip. Anesthesia MD Garcia and MD Mckeon aware. Patient currently on insulin drip. Anesthesia MD Garcia, MD Mckeon and OR RN Shilpa Matos at bedside and aware.

## 2021-04-09 ENCOUNTER — RESULT REVIEW (OUTPATIENT)
Age: 61
End: 2021-04-09

## 2021-04-09 LAB
ALBUMIN SERPL ELPH-MCNC: 2.9 G/DL — LOW (ref 3.5–5.2)
ALBUMIN SERPL ELPH-MCNC: 3.1 G/DL — LOW (ref 3.5–5.2)
ALP SERPL-CCNC: 106 U/L — SIGNIFICANT CHANGE UP (ref 30–115)
ALP SERPL-CCNC: 115 U/L — SIGNIFICANT CHANGE UP (ref 30–115)
ALT FLD-CCNC: 15 U/L — SIGNIFICANT CHANGE UP (ref 0–41)
ALT FLD-CCNC: 15 U/L — SIGNIFICANT CHANGE UP (ref 0–41)
ANION GAP SERPL CALC-SCNC: 11 MMOL/L — SIGNIFICANT CHANGE UP (ref 7–14)
ANION GAP SERPL CALC-SCNC: 16 MMOL/L — HIGH (ref 7–14)
ANION GAP SERPL CALC-SCNC: 8 MMOL/L — SIGNIFICANT CHANGE UP (ref 7–14)
APTT BLD: 23.2 SEC — CRITICAL LOW (ref 27–39.2)
AST SERPL-CCNC: 13 U/L — SIGNIFICANT CHANGE UP (ref 0–41)
AST SERPL-CCNC: 19 U/L — SIGNIFICANT CHANGE UP (ref 0–41)
BASOPHILS # BLD AUTO: 0.02 K/UL — SIGNIFICANT CHANGE UP (ref 0–0.2)
BASOPHILS # BLD AUTO: 0.02 K/UL — SIGNIFICANT CHANGE UP (ref 0–0.2)
BASOPHILS NFR BLD AUTO: 0.2 % — SIGNIFICANT CHANGE UP (ref 0–1)
BASOPHILS NFR BLD AUTO: 0.2 % — SIGNIFICANT CHANGE UP (ref 0–1)
BILIRUB SERPL-MCNC: 0.5 MG/DL — SIGNIFICANT CHANGE UP (ref 0.2–1.2)
BILIRUB SERPL-MCNC: 0.5 MG/DL — SIGNIFICANT CHANGE UP (ref 0.2–1.2)
BUN SERPL-MCNC: 22 MG/DL — HIGH (ref 10–20)
BUN SERPL-MCNC: 30 MG/DL — HIGH (ref 10–20)
BUN SERPL-MCNC: 33 MG/DL — HIGH (ref 10–20)
CALCIUM SERPL-MCNC: 8.1 MG/DL — LOW (ref 8.5–10.1)
CALCIUM SERPL-MCNC: 8.3 MG/DL — LOW (ref 8.5–10.1)
CALCIUM SERPL-MCNC: 8.7 MG/DL — SIGNIFICANT CHANGE UP (ref 8.5–10.1)
CHLORIDE SERPL-SCNC: 101 MMOL/L — SIGNIFICANT CHANGE UP (ref 98–110)
CHLORIDE SERPL-SCNC: 108 MMOL/L — SIGNIFICANT CHANGE UP (ref 98–110)
CHLORIDE SERPL-SCNC: 112 MMOL/L — HIGH (ref 98–110)
CO2 SERPL-SCNC: 17 MMOL/L — SIGNIFICANT CHANGE UP (ref 17–32)
CO2 SERPL-SCNC: 20 MMOL/L — SIGNIFICANT CHANGE UP (ref 17–32)
CO2 SERPL-SCNC: 20 MMOL/L — SIGNIFICANT CHANGE UP (ref 17–32)
COVID-19 SPIKE DOMAIN AB INTERP: NEGATIVE — SIGNIFICANT CHANGE UP
COVID-19 SPIKE DOMAIN ANTIBODY RESULT: <3.8 AU/ML — SIGNIFICANT CHANGE UP
CREAT SERPL-MCNC: 1.3 MG/DL — SIGNIFICANT CHANGE UP (ref 0.7–1.5)
CREAT SERPL-MCNC: 1.7 MG/DL — HIGH (ref 0.7–1.5)
CREAT SERPL-MCNC: 1.8 MG/DL — HIGH (ref 0.7–1.5)
EOSINOPHIL # BLD AUTO: 0 K/UL — SIGNIFICANT CHANGE UP (ref 0–0.7)
EOSINOPHIL # BLD AUTO: 0.01 K/UL — SIGNIFICANT CHANGE UP (ref 0–0.7)
EOSINOPHIL NFR BLD AUTO: 0 % — SIGNIFICANT CHANGE UP (ref 0–8)
EOSINOPHIL NFR BLD AUTO: 0.1 % — SIGNIFICANT CHANGE UP (ref 0–8)
GLUCOSE BLDC GLUCOMTR-MCNC: 116 MG/DL — HIGH (ref 70–99)
GLUCOSE BLDC GLUCOMTR-MCNC: 124 MG/DL — HIGH (ref 70–99)
GLUCOSE BLDC GLUCOMTR-MCNC: 137 MG/DL — HIGH (ref 70–99)
GLUCOSE BLDC GLUCOMTR-MCNC: 139 MG/DL — HIGH (ref 70–99)
GLUCOSE BLDC GLUCOMTR-MCNC: 181 MG/DL — HIGH (ref 70–99)
GLUCOSE BLDC GLUCOMTR-MCNC: 297 MG/DL — HIGH (ref 70–99)
GLUCOSE BLDC GLUCOMTR-MCNC: 344 MG/DL — HIGH (ref 70–99)
GLUCOSE BLDC GLUCOMTR-MCNC: 414 MG/DL — HIGH (ref 70–99)
GLUCOSE BLDC GLUCOMTR-MCNC: 444 MG/DL — HIGH (ref 70–99)
GLUCOSE BLDC GLUCOMTR-MCNC: 63 MG/DL — LOW (ref 70–99)
GLUCOSE BLDC GLUCOMTR-MCNC: 75 MG/DL — SIGNIFICANT CHANGE UP (ref 70–99)
GLUCOSE BLDC GLUCOMTR-MCNC: 80 MG/DL — SIGNIFICANT CHANGE UP (ref 70–99)
GLUCOSE BLDC GLUCOMTR-MCNC: 80 MG/DL — SIGNIFICANT CHANGE UP (ref 70–99)
GLUCOSE BLDC GLUCOMTR-MCNC: 83 MG/DL — SIGNIFICANT CHANGE UP (ref 70–99)
GLUCOSE BLDC GLUCOMTR-MCNC: 84 MG/DL — SIGNIFICANT CHANGE UP (ref 70–99)
GLUCOSE BLDC GLUCOMTR-MCNC: 99 MG/DL — SIGNIFICANT CHANGE UP (ref 70–99)
GLUCOSE SERPL-MCNC: 279 MG/DL — HIGH (ref 70–99)
GLUCOSE SERPL-MCNC: 443 MG/DL — HIGH (ref 70–99)
GLUCOSE SERPL-MCNC: 74 MG/DL — SIGNIFICANT CHANGE UP (ref 70–99)
GRAM STN FLD: SIGNIFICANT CHANGE UP
HCT VFR BLD CALC: 36.4 % — LOW (ref 42–52)
HCT VFR BLD CALC: 37.1 % — LOW (ref 42–52)
HCT VFR BLD CALC: 37.7 % — LOW (ref 42–52)
HGB BLD-MCNC: 12.1 G/DL — LOW (ref 14–18)
HGB BLD-MCNC: 12.3 G/DL — LOW (ref 14–18)
HGB BLD-MCNC: 12.5 G/DL — LOW (ref 14–18)
IMM GRANULOCYTES NFR BLD AUTO: 0.4 % — HIGH (ref 0.1–0.3)
IMM GRANULOCYTES NFR BLD AUTO: 0.6 % — HIGH (ref 0.1–0.3)
INR BLD: 1.09 RATIO — SIGNIFICANT CHANGE UP (ref 0.65–1.3)
LYMPHOCYTES # BLD AUTO: 0.46 K/UL — LOW (ref 1.2–3.4)
LYMPHOCYTES # BLD AUTO: 0.65 K/UL — LOW (ref 1.2–3.4)
LYMPHOCYTES # BLD AUTO: 3.9 % — LOW (ref 20.5–51.1)
LYMPHOCYTES # BLD AUTO: 5.5 % — LOW (ref 20.5–51.1)
MAGNESIUM SERPL-MCNC: 1.9 MG/DL — SIGNIFICANT CHANGE UP (ref 1.8–2.4)
MAGNESIUM SERPL-MCNC: 2 MG/DL — SIGNIFICANT CHANGE UP (ref 1.8–2.4)
MAGNESIUM SERPL-MCNC: 2 MG/DL — SIGNIFICANT CHANGE UP (ref 1.8–2.4)
MCHC RBC-ENTMCNC: 27.6 PG — SIGNIFICANT CHANGE UP (ref 27–31)
MCHC RBC-ENTMCNC: 27.8 PG — SIGNIFICANT CHANGE UP (ref 27–31)
MCHC RBC-ENTMCNC: 27.9 PG — SIGNIFICANT CHANGE UP (ref 27–31)
MCHC RBC-ENTMCNC: 32.6 G/DL — SIGNIFICANT CHANGE UP (ref 32–37)
MCHC RBC-ENTMCNC: 33.2 G/DL — SIGNIFICANT CHANGE UP (ref 32–37)
MCHC RBC-ENTMCNC: 33.8 G/DL — SIGNIFICANT CHANGE UP (ref 32–37)
MCV RBC AUTO: 82.2 FL — SIGNIFICANT CHANGE UP (ref 80–94)
MCV RBC AUTO: 83.2 FL — SIGNIFICANT CHANGE UP (ref 80–94)
MCV RBC AUTO: 85.7 FL — SIGNIFICANT CHANGE UP (ref 80–94)
MONOCYTES # BLD AUTO: 0.97 K/UL — HIGH (ref 0.1–0.6)
MONOCYTES # BLD AUTO: 0.99 K/UL — HIGH (ref 0.1–0.6)
MONOCYTES NFR BLD AUTO: 8.2 % — SIGNIFICANT CHANGE UP (ref 1.7–9.3)
MONOCYTES NFR BLD AUTO: 8.4 % — SIGNIFICANT CHANGE UP (ref 1.7–9.3)
NEUTROPHILS # BLD AUTO: 10.03 K/UL — HIGH (ref 1.4–6.5)
NEUTROPHILS # BLD AUTO: 10.3 K/UL — HIGH (ref 1.4–6.5)
NEUTROPHILS NFR BLD AUTO: 85.2 % — HIGH (ref 42.2–75.2)
NEUTROPHILS NFR BLD AUTO: 87.3 % — HIGH (ref 42.2–75.2)
NRBC # BLD: 0 /100 WBCS — SIGNIFICANT CHANGE UP (ref 0–0)
PHOSPHATE SERPL-MCNC: 2.2 MG/DL — SIGNIFICANT CHANGE UP (ref 2.1–4.9)
PLATELET # BLD AUTO: 187 K/UL — SIGNIFICANT CHANGE UP (ref 130–400)
PLATELET # BLD AUTO: 305 K/UL — SIGNIFICANT CHANGE UP (ref 130–400)
PLATELET # BLD AUTO: 307 K/UL — SIGNIFICANT CHANGE UP (ref 130–400)
POTASSIUM SERPL-MCNC: 3.6 MMOL/L — SIGNIFICANT CHANGE UP (ref 3.5–5)
POTASSIUM SERPL-MCNC: 3.9 MMOL/L — SIGNIFICANT CHANGE UP (ref 3.5–5)
POTASSIUM SERPL-MCNC: 4.2 MMOL/L — SIGNIFICANT CHANGE UP (ref 3.5–5)
POTASSIUM SERPL-SCNC: 3.6 MMOL/L — SIGNIFICANT CHANGE UP (ref 3.5–5)
POTASSIUM SERPL-SCNC: 3.9 MMOL/L — SIGNIFICANT CHANGE UP (ref 3.5–5)
POTASSIUM SERPL-SCNC: 4.2 MMOL/L — SIGNIFICANT CHANGE UP (ref 3.5–5)
PROT SERPL-MCNC: 5.2 G/DL — LOW (ref 6–8)
PROT SERPL-MCNC: 5.3 G/DL — LOW (ref 6–8)
PROTHROM AB SERPL-ACNC: 12.5 SEC — SIGNIFICANT CHANGE UP (ref 9.95–12.87)
RBC # BLD: 4.33 M/UL — LOW (ref 4.7–6.1)
RBC # BLD: 4.43 M/UL — LOW (ref 4.7–6.1)
RBC # BLD: 4.53 M/UL — LOW (ref 4.7–6.1)
RBC # FLD: 13.8 % — SIGNIFICANT CHANGE UP (ref 11.5–14.5)
SARS-COV-2 IGG+IGM SERPL QL IA: <3.8 AU/ML — SIGNIFICANT CHANGE UP
SARS-COV-2 IGG+IGM SERPL QL IA: NEGATIVE — SIGNIFICANT CHANGE UP
SODIUM SERPL-SCNC: 134 MMOL/L — LOW (ref 135–146)
SODIUM SERPL-SCNC: 136 MMOL/L — SIGNIFICANT CHANGE UP (ref 135–146)
SODIUM SERPL-SCNC: 143 MMOL/L — SIGNIFICANT CHANGE UP (ref 135–146)
SPECIMEN SOURCE: SIGNIFICANT CHANGE UP
SPECIMEN SOURCE: SIGNIFICANT CHANGE UP
WBC # BLD: 11.77 K/UL — HIGH (ref 4.8–10.8)
WBC # BLD: 11.8 K/UL — HIGH (ref 4.8–10.8)
WBC # BLD: 9.17 K/UL — SIGNIFICANT CHANGE UP (ref 4.8–10.8)
WBC # FLD AUTO: 11.77 K/UL — HIGH (ref 4.8–10.8)
WBC # FLD AUTO: 11.8 K/UL — HIGH (ref 4.8–10.8)
WBC # FLD AUTO: 9.17 K/UL — SIGNIFICANT CHANGE UP (ref 4.8–10.8)

## 2021-04-09 PROCEDURE — 71045 X-RAY EXAM CHEST 1 VIEW: CPT | Mod: 26

## 2021-04-09 PROCEDURE — 11046 DBRDMT MUSC&/FSCA EA ADDL: CPT

## 2021-04-09 PROCEDURE — 99232 SBSQ HOSP IP/OBS MODERATE 35: CPT

## 2021-04-09 PROCEDURE — 11043 DBRDMT MUSC&/FSCA 1ST 20/<: CPT

## 2021-04-09 PROCEDURE — 10061 I&D ABSCESS COMP/MULTIPLE: CPT | Mod: 59

## 2021-04-09 PROCEDURE — 88304 TISSUE EXAM BY PATHOLOGIST: CPT | Mod: 26

## 2021-04-09 RX ORDER — ONDANSETRON 8 MG/1
4 TABLET, FILM COATED ORAL ONCE
Refills: 0 | Status: DISCONTINUED | OUTPATIENT
Start: 2021-04-09 | End: 2021-04-09

## 2021-04-09 RX ORDER — CEFEPIME 1 G/1
2000 INJECTION, POWDER, FOR SOLUTION INTRAMUSCULAR; INTRAVENOUS EVERY 12 HOURS
Refills: 0 | Status: DISCONTINUED | OUTPATIENT
Start: 2021-04-09 | End: 2021-04-09

## 2021-04-09 RX ORDER — DEXTROSE 50 % IN WATER 50 %
50 SYRINGE (ML) INTRAVENOUS ONCE
Refills: 0 | Status: COMPLETED | OUTPATIENT
Start: 2021-04-09 | End: 2021-04-09

## 2021-04-09 RX ORDER — CHLORHEXIDINE GLUCONATE 213 G/1000ML
1 SOLUTION TOPICAL
Refills: 0 | Status: DISCONTINUED | OUTPATIENT
Start: 2021-04-09 | End: 2021-04-12

## 2021-04-09 RX ORDER — ATORVASTATIN CALCIUM 80 MG/1
40 TABLET, FILM COATED ORAL AT BEDTIME
Refills: 0 | Status: DISCONTINUED | OUTPATIENT
Start: 2021-04-09 | End: 2021-04-12

## 2021-04-09 RX ORDER — SODIUM CHLORIDE 9 MG/ML
1000 INJECTION, SOLUTION INTRAVENOUS
Refills: 0 | Status: DISCONTINUED | OUTPATIENT
Start: 2021-04-09 | End: 2021-04-15

## 2021-04-09 RX ORDER — SODIUM CHLORIDE 9 MG/ML
1000 INJECTION, SOLUTION INTRAVENOUS
Refills: 0 | Status: DISCONTINUED | OUTPATIENT
Start: 2021-04-09 | End: 2021-04-10

## 2021-04-09 RX ORDER — INSULIN HUMAN 100 [IU]/ML
5 INJECTION, SOLUTION SUBCUTANEOUS
Qty: 100 | Refills: 0 | Status: DISCONTINUED | OUTPATIENT
Start: 2021-04-09 | End: 2021-04-10

## 2021-04-09 RX ORDER — CEFEPIME 1 G/1
2000 INJECTION, POWDER, FOR SOLUTION INTRAMUSCULAR; INTRAVENOUS ONCE
Refills: 0 | Status: COMPLETED | OUTPATIENT
Start: 2021-04-09 | End: 2021-04-09

## 2021-04-09 RX ORDER — DEXTROSE 50 % IN WATER 50 %
25 SYRINGE (ML) INTRAVENOUS ONCE
Refills: 0 | Status: DISCONTINUED | OUTPATIENT
Start: 2021-04-09 | End: 2021-04-15

## 2021-04-09 RX ORDER — SODIUM CHLORIDE 9 MG/ML
1000 INJECTION, SOLUTION INTRAVENOUS
Refills: 0 | Status: DISCONTINUED | OUTPATIENT
Start: 2021-04-09 | End: 2021-04-09

## 2021-04-09 RX ORDER — INSULIN GLARGINE 100 [IU]/ML
30 INJECTION, SOLUTION SUBCUTANEOUS ONCE
Refills: 0 | Status: COMPLETED | OUTPATIENT
Start: 2021-04-09 | End: 2021-04-09

## 2021-04-09 RX ORDER — METRONIDAZOLE 500 MG
500 TABLET ORAL EVERY 8 HOURS
Refills: 0 | Status: DISCONTINUED | OUTPATIENT
Start: 2021-04-09 | End: 2021-04-12

## 2021-04-09 RX ORDER — DEXTROSE 50 % IN WATER 50 %
15 SYRINGE (ML) INTRAVENOUS ONCE
Refills: 0 | Status: DISCONTINUED | OUTPATIENT
Start: 2021-04-09 | End: 2021-04-15

## 2021-04-09 RX ORDER — ONDANSETRON 8 MG/1
4 TABLET, FILM COATED ORAL ONCE
Refills: 0 | Status: DISCONTINUED | OUTPATIENT
Start: 2021-04-09 | End: 2021-04-15

## 2021-04-09 RX ORDER — LINEZOLID 600 MG/300ML
600 INJECTION, SOLUTION INTRAVENOUS EVERY 12 HOURS
Refills: 0 | Status: DISCONTINUED | OUTPATIENT
Start: 2021-04-09 | End: 2021-04-12

## 2021-04-09 RX ORDER — HEPARIN SODIUM 5000 [USP'U]/ML
5000 INJECTION INTRAVENOUS; SUBCUTANEOUS EVERY 8 HOURS
Refills: 0 | Status: DISCONTINUED | OUTPATIENT
Start: 2021-04-09 | End: 2021-04-12

## 2021-04-09 RX ORDER — CEFEPIME 1 G/1
2000 INJECTION, POWDER, FOR SOLUTION INTRAMUSCULAR; INTRAVENOUS EVERY 12 HOURS
Refills: 0 | Status: DISCONTINUED | OUTPATIENT
Start: 2021-04-09 | End: 2021-04-12

## 2021-04-09 RX ORDER — HYDROMORPHONE HYDROCHLORIDE 2 MG/ML
0.5 INJECTION INTRAMUSCULAR; INTRAVENOUS; SUBCUTANEOUS
Refills: 0 | Status: DISCONTINUED | OUTPATIENT
Start: 2021-04-09 | End: 2021-04-09

## 2021-04-09 RX ORDER — POTASSIUM CHLORIDE 20 MEQ
20 PACKET (EA) ORAL ONCE
Refills: 0 | Status: COMPLETED | OUTPATIENT
Start: 2021-04-09 | End: 2021-04-09

## 2021-04-09 RX ORDER — GLUCAGON INJECTION, SOLUTION 0.5 MG/.1ML
1 INJECTION, SOLUTION SUBCUTANEOUS ONCE
Refills: 0 | Status: DISCONTINUED | OUTPATIENT
Start: 2021-04-09 | End: 2021-04-15

## 2021-04-09 RX ORDER — DEXTROSE 50 % IN WATER 50 %
12.5 SYRINGE (ML) INTRAVENOUS ONCE
Refills: 0 | Status: DISCONTINUED | OUTPATIENT
Start: 2021-04-09 | End: 2021-04-15

## 2021-04-09 RX ORDER — MORPHINE SULFATE 50 MG/1
2 CAPSULE, EXTENDED RELEASE ORAL EVERY 4 HOURS
Refills: 0 | Status: DISCONTINUED | OUTPATIENT
Start: 2021-04-09 | End: 2021-04-11

## 2021-04-09 RX ORDER — CEFEPIME 1 G/1
INJECTION, POWDER, FOR SOLUTION INTRAMUSCULAR; INTRAVENOUS
Refills: 0 | Status: DISCONTINUED | OUTPATIENT
Start: 2021-04-09 | End: 2021-04-09

## 2021-04-09 RX ADMIN — MORPHINE SULFATE 2 MILLIGRAM(S): 50 CAPSULE, EXTENDED RELEASE ORAL at 21:15

## 2021-04-09 RX ADMIN — ATORVASTATIN CALCIUM 40 MILLIGRAM(S): 80 TABLET, FILM COATED ORAL at 21:44

## 2021-04-09 RX ADMIN — HEPARIN SODIUM 5000 UNIT(S): 5000 INJECTION INTRAVENOUS; SUBCUTANEOUS at 06:06

## 2021-04-09 RX ADMIN — LINEZOLID 300 MILLIGRAM(S): 600 INJECTION, SOLUTION INTRAVENOUS at 06:24

## 2021-04-09 RX ADMIN — HEPARIN SODIUM 5000 UNIT(S): 5000 INJECTION INTRAVENOUS; SUBCUTANEOUS at 21:44

## 2021-04-09 RX ADMIN — Medication 50 MILLIEQUIVALENT(S): at 07:40

## 2021-04-09 RX ADMIN — Medication 50 MILLILITER(S): at 10:42

## 2021-04-09 RX ADMIN — Medication 100 MILLIGRAM(S): at 21:44

## 2021-04-09 RX ADMIN — Medication 100 MILLIGRAM(S): at 06:24

## 2021-04-09 RX ADMIN — INSULIN GLARGINE 30 UNIT(S): 100 INJECTION, SOLUTION SUBCUTANEOUS at 21:44

## 2021-04-09 RX ADMIN — Medication 50 MILLILITER(S): at 12:00

## 2021-04-09 RX ADMIN — CEFEPIME 100 MILLIGRAM(S): 1 INJECTION, POWDER, FOR SOLUTION INTRAMUSCULAR; INTRAVENOUS at 10:18

## 2021-04-09 RX ADMIN — INSULIN HUMAN 14 UNIT(S)/HR: 100 INJECTION, SOLUTION SUBCUTANEOUS at 06:06

## 2021-04-09 RX ADMIN — CHLORHEXIDINE GLUCONATE 1 APPLICATION(S): 213 SOLUTION TOPICAL at 06:06

## 2021-04-09 RX ADMIN — MORPHINE SULFATE 2 MILLIGRAM(S): 50 CAPSULE, EXTENDED RELEASE ORAL at 21:00

## 2021-04-09 RX ADMIN — CEFEPIME 100 MILLIGRAM(S): 1 INJECTION, POWDER, FOR SOLUTION INTRAMUSCULAR; INTRAVENOUS at 20:00

## 2021-04-09 RX ADMIN — SODIUM CHLORIDE 100 MILLILITER(S): 9 INJECTION, SOLUTION INTRAVENOUS at 21:45

## 2021-04-09 RX ADMIN — Medication 50 MILLILITER(S): at 15:45

## 2021-04-09 RX ADMIN — LINEZOLID 300 MILLIGRAM(S): 600 INJECTION, SOLUTION INTRAVENOUS at 20:00

## 2021-04-09 RX ADMIN — Medication 1 APPLICATION(S): at 06:08

## 2021-04-09 RX ADMIN — SODIUM CHLORIDE 100 MILLILITER(S): 9 INJECTION, SOLUTION INTRAVENOUS at 07:40

## 2021-04-09 NOTE — PROGRESS NOTE ADULT - ASSESSMENT
60 years old male known to have uncontrolled DM2, diabetic retinopathy and neuropathy, HTN, morbid obesity, CKD 3, who called EMS today after falling at home yesterday and was unable to get up.   He was noted in the ED to be tachypneic with increased respiratory effort. In the ED the FS was initially undetectable, then > 600. Labs grossly deranged, including glucose of 687, WBC of 19.2, hyponatremic to 129, BUN/Cr 38/2.2. Beta-hydroxybuturate >9, consistent with DKA. HCO3 8 > 3 s/p 3 bicarbonate iv pushes, started on bicarbonate drip. ABGs showed PH 7.17 > 7.08. Pt received 3 L NS.  The ED was unable to obtain a temperature on arrival, he was bradycardic to 50 bpm, 130/70, saturating 95% on 6L NC.  CT scan demonstrated edema/inflammatory changes in the soft tissues of the anterior pelvis involving the left groin and pubic region. Small 2.1 cm fluid pocket along the skin surface. No soft tissue gas identified.     IMPRESSION;  Resolved Sepsis secondary to a complex multiloculated abscess pelvic area/ left groin  4/8 S/p drainage of abscess  Gm stain : gm positive in luis ( strep ? ) and GNRs. Doubt ORSA  WBC 20.5>11.7  re[peat debridement 4/9  Will probably need repeated surgical debridements  Scrotum is erythematous but not infected  Perineum with no infection  Renal failure : resolving  Latif placed 4/8    RECOMMENDATIONS;  F/u OR cultures  BCx  Zyvox 600 mg iv q12h  Cefepime  2 gm iv q12h  Flagyl 500 mg iv q8h

## 2021-04-09 NOTE — PROGRESS NOTE ADULT - SUBJECTIVE AND OBJECTIVE BOX
Hospital Day:  2d    Chief Complaint: Patient is a 60y old  Male who presents with a chief complaint of DKA (08 Apr 2021 15:01)    24 hour events: No acute overnight events. Patient is s/p abscess debridement by burn. He remains on insulin gtt, he is going for dental and another burn debridement today.     Past Medical Hx:   Diabetes    Diabetes mellitus    Dyslipidemia    Hypertension      Past Sx:  No significant past surgical history      Allergies:  No Known Allergies    Current Meds:   Standing Meds:  atorvastatin 40 milliGRAM(s) Oral at bedtime  cefepime   IVPB      cefepime   IVPB 2000 milliGRAM(s) IV Intermittent every 12 hours  chlorhexidine 4% Liquid 1 Application(s) Topical <User Schedule>  clotrimazole 1% Cream 1 Application(s) Topical every 12 hours  dextrose 5% + sodium chloride 0.9%. 1000 milliLiter(s) (100 mL/Hr) IV Continuous <Continuous>  heparin   Injectable 5000 Unit(s) SubCutaneous every 8 hours  insulin regular Infusion 14 Unit(s)/Hr (14 mL/Hr) IV Continuous <Continuous>  linezolid  IVPB 600 milliGRAM(s) IV Intermittent every 12 hours  metroNIDAZOLE  IVPB 500 milliGRAM(s) IV Intermittent every 8 hours    PRN Meds:    HOME MEDICATIONS:  atorvastatin 40 mg oral tablet: 1 tab(s) orally once a day  gabapentin 600 mg oral tablet: 1 tab(s) orally 3 times a day  Jardiance 10 mg oral tablet: 1 tab(s) orally once a day (in the morning)  lisinopril 20 mg oral tablet: 1 tab(s) orally once a day  pioglitazone 30 mg oral tablet: 1 tab(s) orally once a day  Victoza 18 mg/3 mL subcutaneous solution:       Vital Signs:   T(F): 97.1 (04-09-21 @ 08:00), Max: 98.9 (04-08-21 @ 17:15)  HR: 84 (04-09-21 @ 10:00) (80 - 102)  BP: 123/59 (04-09-21 @ 10:00) (88/61 - 144/75)  RR: 13 (04-09-21 @ 10:00) (12 - 23)  SpO2: 100% (04-09-21 @ 10:00) (97% - 100%)      04-08-21 @ 07:01  -  04-09-21 @ 07:00  --------------------------------------------------------  IN: 5889 mL / OUT: 3695 mL / NET: 2194 mL    04-09-21 @ 07:01  -  04-09-21 @ 12:03  --------------------------------------------------------  IN: 395 mL / OUT: 135 mL / NET: 260 mL    Physical Exam:   GENERAL: obese male in NAD  HEENT: NCAT  CHEST/LUNG: audible breath sounds bilaterally   HEART: Regular rate and rhythm; s1 s2 appreciated  ABDOMEN: Soft, Nontender, Nondistended abdomen   EXTREMITIES: No LE edema b/l  SKIN: groin wound   NERVOUS SYSTEM:  Alert & Oriented X3    Labs:                         12.3   11.77 )-----------( 305      ( 09 Apr 2021 04:40 )             36.4     Neutophil% 85.2, Lymphocyte% 5.5, Monocyte% 8.4, Bands% 0.6 04-09-21 @ 04:40    09 Apr 2021 04:40    136    |  108    |  30     ----------------------------<  279    3.6     |  20     |  1.7      Ca    8.7        09 Apr 2021 04:40  Phos  2.4       08 Apr 2021 04:53  Mg     2.0       09 Apr 2021 04:40    TPro  5.3    /  Alb  3.1    /  TBili  0.5    /  DBili  x      /  AST  13     /  ALT  15     /  AlkPhos  106    09 Apr 2021 00:46       pTT    23.2             ----< 1.09 INR  (04-09-21 @ 00:46)    12.50        PT    Serum Pro-Brain Natriuretic Peptide: 217 pg/mL (04-07-21 @ 11:45)    Troponin <0.01, CKMB 13.5,  04-07-21 @ 21:00  Troponin 0.01, CKMB --, CK -- 04-07-21 @ 11:45    Culture - Blood (collected 04-07-21 @ 12:08)  Source: .Blood Blood  Preliminary Report (04-08-21 @ 22:01):    No growth to date.    Culture - Blood (collected 04-07-21 @ 12:08)  Source: .Blood Blood  Preliminary Report (04-08-21 @ 22:01):    No growth to date.        Radiology:

## 2021-04-09 NOTE — CONSULT NOTE ADULT - CONSULT REQUESTED DATE/TIME
07-Apr-2021 14:44
07-Apr-2021 15:36
09-Apr-2021 12:29
08-Apr-2021 08:19
08-Apr-2021 11:22
08-Apr-2021 10:49

## 2021-04-09 NOTE — PROGRESS NOTE ADULT - ASSESSMENT
60 year old male with uncontrolled DM2, diabetic retinopathy, and neuropathy, HTN, morbid obesity,     # Metabolic acidosis secondary to DKA   - PH 7.17 > 7.08, HCO3 8 > 5, B-OH>9.0   - HbA1c >15.5% possible medication noncompliance. follows with Dr. Vick OP   - trop negative x2  - c/w insulin drip and D5+halfNS for now. FS q1h   - Follow up lytes and replete  - Appreciate Endocrinology cst: after gtt start lantus 70 and lispro 20 with meals.     # Necrotizing pubic abscess, sepsis present on admission  - May have contributed in being a trigger for DKA.   - ID evaluated the patient, continue with Zyvox 600mg IV q12h, Cefepime 2g IV q12h, Flagyl 500mg q8h.  - Burn taking the patient for another surgical debridement today. First debridement on 4/8. Continue NPO for procedure, keep insulin gtt for now.   - Blood cultures show NGT   - Surgical abscess cultures pending  - Follow up mrsa swab    # pseudo hyponatremia on admission - resolved   - Na corrected for Glucose was 141 on admiossion likely secondary to hyperglycemia    # HTN - BP low, holding home meds for now    # VALDEZ on CKD - improving Cr trending down with fluids   - avoid nephrotoxins    # Obesity    DVT PPx: heparin sq  GI ppx: not indicated  Diet: npo for now  Activity: bedrest

## 2021-04-09 NOTE — PROGRESS NOTE ADULT - SUBJECTIVE AND OBJECTIVE BOX
S: patient seen in CCU, going to OR had dental procedure and was not able to talk much, currently NPO , insulin drip rate at 1 u/hr and BG low   O: Vital Signs Last 24 Hrs  T(C): 36.2 (09 Apr 2021 08:00), Max: 37.2 (08 Apr 2021 17:15)  T(F): 97.1 (09 Apr 2021 08:00), Max: 98.9 (08 Apr 2021 17:15)  HR: 84 (09 Apr 2021 10:00) (80 - 102)  BP: 123/59 (09 Apr 2021 10:00) (88/61 - 144/75)  BP(mean): 77 (09 Apr 2021 10:00) (65 - 96)  RR: 13 (09 Apr 2021 10:00) (12 - 23)  SpO2: 100% (09 Apr 2021 10:00) (97% - 100%)                          12.3   11.77 )-----------( 305      ( 09 Apr 2021 04:40 )             36.4   04-09    136  |  108  |  30<H>  ----------------------------<  279<H>  3.6   |  20  |  1.7<H>    Ca    8.7      09 Apr 2021 04:40  Phos  2.4     04-08  Mg     2.0     04-09    TPro  5.3<L>  /  Alb  3.1<L>  /  TBili  0.5  /  DBili  x   /  AST  13  /  ALT  15  /  AlkPhos  106  04-09    MEDICATIONS  (STANDING):  atorvastatin 40 milliGRAM(s) Oral at bedtime  cefepime   IVPB      cefepime   IVPB 2000 milliGRAM(s) IV Intermittent every 12 hours  chlorhexidine 4% Liquid 1 Application(s) Topical <User Schedule>  clotrimazole 1% Cream 1 Application(s) Topical every 12 hours  dextrose 5% + sodium chloride 0.9%. 1000 milliLiter(s) (100 mL/Hr) IV Continuous <Continuous>  dextrose 50% Injectable 50 milliLiter(s) IV Push once  heparin   Injectable 5000 Unit(s) SubCutaneous every 8 hours  insulin regular Infusion 14 Unit(s)/Hr (14 mL/Hr) IV Continuous <Continuous>  linezolid  IVPB 600 milliGRAM(s) IV Intermittent every 12 hours  metroNIDAZOLE  IVPB 500 milliGRAM(s) IV Intermittent every 8 hours    MEDICATIONS  (PRN):

## 2021-04-09 NOTE — PROGRESS NOTE ADULT - ASSESSMENT
1. DKA/ DM: secondary to most likely groin cellulitis, tx insulin gtt, monitor for now,  AG has closed will  convert to long acting insulin after surgery.    2. Groin Cellulitis: tx empiric abx as per ID, will f/u Surgery pt will go to OR for debridement again today which will be second day, continue supportive care, monitor for now.    3. VALDEZ on CKD: secondary to dehydration tx IVF;s monitor for now.    4. HTN: continue supportive care, monitor for now.    5. HLD: tx statin.    6. Morbid Obesity: continue supportive care, monitor for now.    7. DVT px: tx hep subcut,  lower ext US neg for DVT.    8. NUT: keep NPO tx IVF's

## 2021-04-09 NOTE — CONSULT NOTE ADULT - SUBJECTIVE AND OBJECTIVE BOX
Patient is a 60y old  Male who presents with a chief complaint of DKA (09 Apr 2021 12:02). Patient presents for dental clearance prior to surgery.       HPI:  60 years old male known to have uncontrolled DM2, diabetic retinopathy and neuropathy, HTN, morbid obesity, CKD 3, who called EMS today after falling at home yesterday and was unable to get up.     Pt on my examination is Alert and awake but does not recall the events leading to hosp adm. History obtained from ED staff and charts.  He was noted in the ED to be tachypneic with increased respiratory effort. In the ED the FS was initially undetectable, then > 600. Labs grossly deranged, including glucose of 687, WBC of 19.2, hyponatremic to 129, BUN/Cr 38/2.2. Beta-hydroxybuturate >9, consistent with DKA. HCO3 8 > 3 s/p 3 bicarbonate iv pushes, started on bicarbonate drip. ABGs showed PH 7.17 > 7.08. Pt received 3 L NS.    The ED was unable to obtain a temperature on arrival, he was bradycardic to 50 bpm, 130/70, saturating 95% on 6L NC initially but on my examination pt was > 92% on RA. His exam is notable for erythema of the groin below the panus as well as on other side of the penis and scrotum. Scrotum is indurated. There are numerous areas of skin excoriation with what appears to be purulent discharge. CT scan demonstrated edema/inflammatory changes in the soft tissues of the anterior pelvis involving the left groin and pubic region. Small 2.1 cm fluid pocket along the skin surface. No soft tissue gas identified. Patient was evaluated by Urology in the ED, team cultured areas of drainage. Differential from their standpoint is Fourniers vs. hidradenitis. Surgery Dr Hopkins evaluated the pt and recs it is superficial cellulitis and to Rx with iv abx.  could not do the medrec with the pt, done as per Dr Cormier's outpt notes (07 Apr 2021 18:20)      PAST MEDICAL & SURGICAL HISTORY:  Diabetes mellitus    Dyslipidemia    Hypertension    No significant past surgical history      (   ) heart valve replacement  (   ) joint replacement  (   ) pregnancy    MEDICATIONS  (STANDING):  atorvastatin 40 milliGRAM(s) Oral at bedtime  cefepime   IVPB      cefepime   IVPB 2000 milliGRAM(s) IV Intermittent every 12 hours  chlorhexidine 4% Liquid 1 Application(s) Topical <User Schedule>  clotrimazole 1% Cream 1 Application(s) Topical every 12 hours  dextrose 5% + sodium chloride 0.9%. 1000 milliLiter(s) (100 mL/Hr) IV Continuous <Continuous>  dextrose 50% Injectable 50 milliLiter(s) IV Push once  heparin   Injectable 5000 Unit(s) SubCutaneous every 8 hours  insulin regular Infusion 14 Unit(s)/Hr (14 mL/Hr) IV Continuous <Continuous>  linezolid  IVPB 600 milliGRAM(s) IV Intermittent every 12 hours  metroNIDAZOLE  IVPB 500 milliGRAM(s) IV Intermittent every 8 hours    MEDICATIONS  (PRN):      Allergies    No Known Allergies    Intolerances        FAMILY HISTORY:      *SOCIAL HISTORY: (-   ) Tobacco; (  - ) ETOH    *Last Dental Visit:    Vital Signs Last 24 Hrs  T(C): 36.2 (09 Apr 2021 08:00), Max: 37.2 (08 Apr 2021 17:15)  T(F): 97.1 (09 Apr 2021 08:00), Max: 98.9 (08 Apr 2021 17:15)  HR: 84 (09 Apr 2021 10:00) (80 - 102)  BP: 123/59 (09 Apr 2021 10:00) (88/61 - 144/75)  BP(mean): 77 (09 Apr 2021 10:00) (65 - 96)  RR: 13 (09 Apr 2021 10:00) (12 - 23)  SpO2: 100% (09 Apr 2021 10:00) (97% - 100%)    LABS:                        12.3   11.77 )-----------( 305      ( 09 Apr 2021 04:40 )             36.4     04-09    136  |  108  |  30<H>  ----------------------------<  279<H>  3.6   |  20  |  1.7<H>    Ca    8.7      09 Apr 2021 04:40  Phos  2.4     04-08  Mg     2.0     04-09    TPro  5.3<L>  /  Alb  3.1<L>  /  TBili  0.5  /  DBili  x   /  AST  13  /  ALT  15  /  AlkPhos  106  04-09    WBC Count: 11.77 K/uL *H* [4.80 - 10.80] (04-09 @ 04:40)  Platelet Count - Automated: 305 K/uL [130 - 400] (04-09 @ 04:40)  WBC Count: 11.80 K/uL *H* [4.80 - 10.80] (04-09 @ 00:46)  Platelet Count - Automated: 307 K/uL [130 - 400] (04-09 @ 00:46)  INR: 1.09 ratio [0.65 - 1.30] (04-09 @ 00:46)  Culture Results:   Testing in progress (04-08 @ 17:14)  Culture Results:   Testing in progress (04-08 @ 17:13)  Platelet Count - Automated: 385 K/uL [130 - 400] (04-08 @ 04:53)  WBC Count: 20.51 K/uL *H* [4.80 - 10.80] (04-08 @ 04:53)  Culture Results:   Culture yields >4 types of aerobic and/or anaerobic bacteria  Call client services within 7 days if further workup is clinically  indicated. (04-07 @ 14:07)  Culture Results:   No growth to date. (04-07 @ 12:08)  Culture Results:   No growth to date. (04-07 @ 12:08)  Platelet Count - Automated: 482 K/uL *H* [130 - 400] (04-07 @ 11:45)  WBC Count: 19.25 K/uL *H* [4.80 - 10.80] (04-07 @ 11:45)  INR: 0.97 ratio [0.65 - 1.30] (04-07 @ 11:45)        PT/INR - ( 09 Apr 2021 00:46 )   PT: 12.50 sec;   INR: 1.09 ratio         PTT - ( 09 Apr 2021 00:46 )  PTT:23.2 sec  EOE:  TMJ ( -  ) clicks                     (  - ) pops                     (  - ) crepitus             Mandible <<FROM>>             Facial bones and MOM <<grossly intact>>             ( -  ) trismus             ( -  ) lymphadenopathy             ( -  ) swelling             ( -  ) asymmetry             ( -  ) palpation             ( -  ) dyspnea             ( -  ) dysphagia             ( -  ) loss of consciousness    IOE:  <<permanent/primary/mixed>> dentition:  <<multiple missing teeth>>           hard/soft palate:  (  - ) palatal torus, <<No pathology noted>>           tongue/FOM <<No pathology noted>>           labial/buccal mucosa <<No pathology noted>>           ( -  ) percussion           ( -  ) palpation           ( -  ) swelling            ( -  ) abscess           ( -  ) sinus tract    Dentition present: <<   >>  Mobility: <<  >>  Caries: <<   >>         *DENTAL RADIOGRAPHS: 6 diagnostic periapicals and 2 post operative periapicals     RADIOLOGY & ADDITIONAL STUDIES:    *ASSESSMENT: Upon clinical evaluation, #7, 23, 24, 25 exhibit grade 3 mobility.       *PLAN: #7, 23, 24, 25 require extraction.     PROCEDURE:   Informed patient #7, 23, 24, 25 require extraction due to mobility. Risks and benefits discussed as per OS sheet dated 07/13/2000. Consent signed and side site verified. Anesthestized with 4 carpules 2% lidocaine 1:100,000 epinephrine via local infiltration. Tissue elevated and #7, 23, 24, 25 luxated with straight forceps. Socket curetted and irrigated with saline rinse. Gauze placed. Hemostasis achieved. Post operative radiograph obtained. Post operative instructions provided.  Patient cleared for surgery from dental standpoint.     RECOMMENDATIONS:  1) Post operative instructions. Patient cleared for surgery from dental standpoint.   2) Dental F/U with outpatient dentist for comprehensive dental care.   3) If any difficulty swallowing/breathing, fever occur, return to ER.     Scott Proctor DDS

## 2021-04-09 NOTE — PROGRESS NOTE ADULT - SUBJECTIVE AND OBJECTIVE BOX
Patient is a 60y old  Male who presents with a chief complaint of DKA (09 Apr 2021 12:43)        Over Night Events:        ROS:     All ROS are negative except HPI         PHYSICAL EXAM    ICU Vital Signs Last 24 Hrs  T(C): 36.2 (09 Apr 2021 08:00), Max: 37.2 (08 Apr 2021 17:15)  T(F): 97.1 (09 Apr 2021 08:00), Max: 98.9 (08 Apr 2021 17:15)  HR: 88 (09 Apr 2021 13:00) (80 - 102)  BP: 127/69 (09 Apr 2021 13:00) (88/61 - 144/75)  BP(mean): 88 (09 Apr 2021 13:00) (65 - 96)  ABP: --  ABP(mean): --  RR: 26 (09 Apr 2021 13:00) (12 - 26)  SpO2: 98% (09 Apr 2021 13:00) (96% - 100%)      CONSTITUTIONAL:  Well nourished.  NAD    ENT:   Airway patent,   Mouth with normal mucosa.   No thrush    EYES:   Pupils equal,   Round and reactive to light.    CARDIAC:   Normal rate,   Regular rhythm.    No edema      Vascular:  Normal systolic impulse  No Carotid bruits    RESPIRATORY:   No wheezing  Bilateral BS  Normal chest expansion  Not tachypneic,  No use of accessory muscles    GASTROINTESTINAL:  Abdomen soft,   Non-tender,   No guarding,   + BS    MUSCULOSKELETAL:   Range of motion is not limited,  No clubbing, cyanosis    NEUROLOGICAL:   Alert and oriented   No motor  deficits.    SKIN:   Skin normal color for race,   Warm and dry and intact.   No evidence of rash.    PSYCHIATRIC:   Normal mood and affect.   No apparent risk to self or others.    HEMATOLOGICAL:  No cervical  lymphadenopathy.  no inguinal lymphadenopathy      04-08-21 @ 07:01  -  04-09-21 @ 07:00  --------------------------------------------------------  IN:    dextrose 5% + sodium chloride 0.45%: 1500 mL    dextrose 5% + sodium chloride 0.45%: 1800 mL    Insulin: 42 mL    Insulin: 84 mL    Insulin: 36 mL    Insulin: 14 mL    Insulin: 39 mL    Insulin: 24 mL    IV PiggyBack: 850 mL    sodium chloride 0.9% w/ Additives: 1500 mL  Total IN: 5889 mL    OUT:    Indwelling Catheter - Urethral (mL): 3095 mL    Voided (mL): 600 mL  Total OUT: 3695 mL    Total NET: 2194 mL      04-09-21 @ 07:01  -  04-09-21 @ 13:56  --------------------------------------------------------  IN:    dextrose 5% + sodium chloride 0.9%: 650 mL    Insulin: 24 mL    sodium chloride 0.9% w/ Additives: 125 mL  Total IN: 799 mL    OUT:    Indwelling Catheter - Urethral (mL): 560 mL  Total OUT: 560 mL    Total NET: 239 mL          LABS:                            12.3   11.77 )-----------( 305      ( 09 Apr 2021 04:40 )             36.4                                               04-09    136  |  108  |  30<H>  ----------------------------<  279<H>  3.6   |  20  |  1.7<H>    Ca    8.7      09 Apr 2021 04:40  Phos  2.4     04-08  Mg     2.0     04-09    TPro  5.3<L>  /  Alb  3.1<L>  /  TBili  0.5  /  DBili  x   /  AST  13  /  ALT  15  /  AlkPhos  106  04-09      PT/INR - ( 09 Apr 2021 00:46 )   PT: 12.50 sec;   INR: 1.09 ratio         PTT - ( 09 Apr 2021 00:46 )  PTT:23.2 sec                                           CARDIAC MARKERS ( 07 Apr 2021 21:00 )  x     / <0.01 ng/mL / 330 U/L / x     / 13.5 ng/mL                                            LIVER FUNCTIONS - ( 09 Apr 2021 00:46 )  Alb: 3.1 g/dL / Pro: 5.3 g/dL / ALK PHOS: 106 U/L / ALT: 15 U/L / AST: 13 U/L / GGT: x                                                  Culture - Fungal, Tissue (collected 08 Apr 2021 17:14)  Source: .Tissue PUBIS  Preliminary Report (09 Apr 2021 08:48):    Testing in progress    Culture - Tissue with Gram Stain (collected 08 Apr 2021 17:14)  Source: .Tissue PUBIS  Gram Stain (09 Apr 2021 04:08):    Few polymorphonuclear leukocytes seen per low power field    Numerous Gram Variable Rods seen per oil power field    Culture - Fungal, Tissue (collected 08 Apr 2021 17:13)  Source: .Tissue PUBIS  Preliminary Report (09 Apr 2021 08:48):    Testing in progress    Culture - Tissue with Gram Stain (collected 08 Apr 2021 17:13)  Source: .Tissue PUBIS  Gram Stain (09 Apr 2021 04:13):    Numerous polymorphonuclear leukocytes seen per low power field    Numerous Gram Variable Rods seen per oil power field    Rare Gram positive cocci in pairs seen per oil power field    Culture - Abscess with Gram Stain (collected 07 Apr 2021 14:07)  Source: .Abscess suprapubic region  Preliminary Report (08 Apr 2021 19:01):    Culture yields >4 types of aerobic and/or anaerobic bacteria    Call client services within 7 days if further workup is clinically    indicated.    Culture - Blood (collected 07 Apr 2021 12:08)  Source: .Blood Blood  Preliminary Report (08 Apr 2021 22:01):    No growth to date.    Culture - Blood (collected 07 Apr 2021 12:08)  Source: .Blood Blood  Preliminary Report (08 Apr 2021 22:01):    No growth to date.                                                                                           MEDICATIONS  (STANDING):  atorvastatin 40 milliGRAM(s) Oral at bedtime  cefepime   IVPB      cefepime   IVPB 2000 milliGRAM(s) IV Intermittent every 12 hours  chlorhexidine 4% Liquid 1 Application(s) Topical <User Schedule>  clotrimazole 1% Cream 1 Application(s) Topical every 12 hours  dextrose 5% + sodium chloride 0.9%. 1000 milliLiter(s) (100 mL/Hr) IV Continuous <Continuous>  heparin   Injectable 5000 Unit(s) SubCutaneous every 8 hours  insulin regular Infusion 14 Unit(s)/Hr (14 mL/Hr) IV Continuous <Continuous>  linezolid  IVPB 600 milliGRAM(s) IV Intermittent every 12 hours  metroNIDAZOLE  IVPB 500 milliGRAM(s) IV Intermittent every 8 hours    MEDICATIONS  (PRN):      New X-rays reviewed:                                                                                  ECHO    CXR interpreted by me:

## 2021-04-09 NOTE — PROGRESS NOTE ADULT - SUBJECTIVE AND OBJECTIVE BOX
THOMAS HARDIN  60y, Male    All available historical data reviewed    OVERNIGHT EVENTS:  no fevers  4/8 PROCEDURES:  Complex drainage of abscess     ROS:  General: Denies rigors, nightsweats  HEENT: Denies headache, rhinorrhea, sore throat, eye pain  CV: Denies CP, palpitations  PULM: Denies wheezing, hemoptysis  GI: Denies hematemesis, hematochezia, melena  : Denies discharge, hematuria  MSK: Denies arthralgias, myalgias  SKIN: Denies rash, lesions  NEURO: Denies paresthesias, weakness  PSYCH: Denies depression, anxiety    VITALS:  T(F): 97.1, Max: 98.9 (04-08-21 @ 17:15)  HR: 88  BP: 127/69  RR: 26Vital Signs Last 24 Hrs  T(C): 36.2 (09 Apr 2021 08:00), Max: 37.2 (08 Apr 2021 17:15)  T(F): 97.1 (09 Apr 2021 08:00), Max: 98.9 (08 Apr 2021 17:15)  HR: 88 (09 Apr 2021 13:00) (80 - 102)  BP: 127/69 (09 Apr 2021 13:00) (88/61 - 144/75)  BP(mean): 88 (09 Apr 2021 13:00) (65 - 96)  RR: 26 (09 Apr 2021 13:00) (12 - 26)  SpO2: 98% (09 Apr 2021 13:00) (96% - 100%)    TESTS & MEASUREMENTS:                        12.3   11.77 )-----------( 305      ( 09 Apr 2021 04:40 )             36.4     04-09    136  |  108  |  30<H>  ----------------------------<  279<H>  3.6   |  20  |  1.7<H>    Ca    8.7      09 Apr 2021 04:40  Phos  2.4     04-08  Mg     2.0     04-09    TPro  5.3<L>  /  Alb  3.1<L>  /  TBili  0.5  /  DBili  x   /  AST  13  /  ALT  15  /  AlkPhos  106  04-09    LIVER FUNCTIONS - ( 09 Apr 2021 00:46 )  Alb: 3.1 g/dL / Pro: 5.3 g/dL / ALK PHOS: 106 U/L / ALT: 15 U/L / AST: 13 U/L / GGT: x             Culture - Fungal, Tissue (collected 04-08-21 @ 17:14)  Source: .Tissue PUBIS  Preliminary Report (04-09-21 @ 08:48):    Testing in progress    Culture - Tissue with Gram Stain (collected 04-08-21 @ 17:14)  Source: .Tissue PUBIS  Gram Stain (04-09-21 @ 04:08):    Few polymorphonuclear leukocytes seen per low power field    Numerous Gram Variable Rods seen per oil power field    Culture - Fungal, Tissue (collected 04-08-21 @ 17:13)  Source: .Tissue PUBIS  Preliminary Report (04-09-21 @ 08:48):    Testing in progress    Culture - Tissue with Gram Stain (collected 04-08-21 @ 17:13)  Source: .Tissue PUBIS  Gram Stain (04-09-21 @ 04:13):    Numerous polymorphonuclear leukocytes seen per low power field    Numerous Gram Variable Rods seen per oil power field    Rare Gram positive cocci in pairs seen per oil power field    Culture - Abscess with Gram Stain (collected 04-07-21 @ 14:07)  Source: .Abscess suprapubic region  Preliminary Report (04-08-21 @ 19:01):    Culture yields >4 types of aerobic and/or anaerobic bacteria    Call client services within 7 days if further workup is clinically    indicated.    Culture - Blood (collected 04-07-21 @ 12:08)  Source: .Blood Blood  Preliminary Report (04-08-21 @ 22:01):    No growth to date.    Culture - Blood (collected 04-07-21 @ 12:08)  Source: .Blood Blood  Preliminary Report (04-08-21 @ 22:01):    No growth to date.            RADIOLOGY & ADDITIONAL TESTS:  Personal review of radiological diagnostics performed  Echo and EKG results noted when applicable.     MEDICATIONS:  atorvastatin 40 milliGRAM(s) Oral at bedtime  cefepime   IVPB      cefepime   IVPB 2000 milliGRAM(s) IV Intermittent every 12 hours  chlorhexidine 4% Liquid 1 Application(s) Topical <User Schedule>  clotrimazole 1% Cream 1 Application(s) Topical every 12 hours  dextrose 5% + sodium chloride 0.9%. 1000 milliLiter(s) IV Continuous <Continuous>  heparin   Injectable 5000 Unit(s) SubCutaneous every 8 hours  insulin regular Infusion 14 Unit(s)/Hr IV Continuous <Continuous>  linezolid  IVPB 600 milliGRAM(s) IV Intermittent every 12 hours  metroNIDAZOLE  IVPB 500 milliGRAM(s) IV Intermittent every 8 hours      ANTIBIOTICS:  cefepime   IVPB      cefepime   IVPB 2000 milliGRAM(s) IV Intermittent every 12 hours  linezolid  IVPB 600 milliGRAM(s) IV Intermittent every 12 hours  metroNIDAZOLE  IVPB 500 milliGRAM(s) IV Intermittent every 8 hours

## 2021-04-09 NOTE — PROGRESS NOTE ADULT - ASSESSMENT
60 years old male known to have uncontrolled DM2, diabetic retinopathy and neuropathy, HTN, morbid obesity, CKD 3, who called EMS after falling at home found to have DKA and sepsis from necrotising infected hematoma in groin vs abscess. Endo eval requested for uncontrolled DM and DKA.    # DKA likely in the setting of sepsis from infected groin hematoma vs abscess  - HbA1c >15.5 uncontrolled , AG closed patient going to OR for second debridement , now NPO   - once back from OR , give Lantus 60 units x1 and then every 24 hours  and stop insulin drip 2 hrs after dose is given   - Start lispro 15-20 units TIDAC once not NPO   - Lispro SS 2:50 >150   - follows as outpatient with Dr Cormier

## 2021-04-10 DIAGNOSIS — F32.9 MAJOR DEPRESSIVE DISORDER, SINGLE EPISODE, UNSPECIFIED: ICD-10-CM

## 2021-04-10 LAB
-  AMPICILLIN/SULBACTAM: SIGNIFICANT CHANGE UP
-  CEFAZOLIN: SIGNIFICANT CHANGE UP
-  CLINDAMYCIN: SIGNIFICANT CHANGE UP
-  ERYTHROMYCIN: SIGNIFICANT CHANGE UP
-  GENTAMICIN: SIGNIFICANT CHANGE UP
-  OXACILLIN: SIGNIFICANT CHANGE UP
-  PENICILLIN: SIGNIFICANT CHANGE UP
-  RIFAMPIN: SIGNIFICANT CHANGE UP
-  TETRACYCLINE: SIGNIFICANT CHANGE UP
-  TRIMETHOPRIM/SULFAMETHOXAZOLE: SIGNIFICANT CHANGE UP
-  VANCOMYCIN: SIGNIFICANT CHANGE UP
ANION GAP SERPL CALC-SCNC: 9 MMOL/L — SIGNIFICANT CHANGE UP (ref 7–14)
BASOPHILS # BLD AUTO: 0.04 K/UL — SIGNIFICANT CHANGE UP (ref 0–0.2)
BASOPHILS NFR BLD AUTO: 0.4 % — SIGNIFICANT CHANGE UP (ref 0–1)
BUN SERPL-MCNC: 18 MG/DL — SIGNIFICANT CHANGE UP (ref 10–20)
CALCIUM SERPL-MCNC: 8.4 MG/DL — LOW (ref 8.5–10.1)
CHLORIDE SERPL-SCNC: 105 MMOL/L — SIGNIFICANT CHANGE UP (ref 98–110)
CO2 SERPL-SCNC: 22 MMOL/L — SIGNIFICANT CHANGE UP (ref 17–32)
CREAT SERPL-MCNC: 1.3 MG/DL — SIGNIFICANT CHANGE UP (ref 0.7–1.5)
CULTURE RESULTS: SIGNIFICANT CHANGE UP
EOSINOPHIL # BLD AUTO: 0.04 K/UL — SIGNIFICANT CHANGE UP (ref 0–0.7)
EOSINOPHIL NFR BLD AUTO: 0.4 % — SIGNIFICANT CHANGE UP (ref 0–8)
GLUCOSE BLDC GLUCOMTR-MCNC: 142 MG/DL — HIGH (ref 70–99)
GLUCOSE BLDC GLUCOMTR-MCNC: 198 MG/DL — HIGH (ref 70–99)
GLUCOSE BLDC GLUCOMTR-MCNC: 234 MG/DL — HIGH (ref 70–99)
GLUCOSE BLDC GLUCOMTR-MCNC: 260 MG/DL — HIGH (ref 70–99)
GLUCOSE BLDC GLUCOMTR-MCNC: 288 MG/DL — HIGH (ref 70–99)
GLUCOSE SERPL-MCNC: 158 MG/DL — HIGH (ref 70–99)
GRAM STN FLD: SIGNIFICANT CHANGE UP
HCT VFR BLD CALC: 34.8 % — LOW (ref 42–52)
HGB BLD-MCNC: 11.7 G/DL — LOW (ref 14–18)
IMM GRANULOCYTES NFR BLD AUTO: 0.8 % — HIGH (ref 0.1–0.3)
LYMPHOCYTES # BLD AUTO: 0.74 K/UL — LOW (ref 1.2–3.4)
LYMPHOCYTES # BLD AUTO: 8.2 % — LOW (ref 20.5–51.1)
MAGNESIUM SERPL-MCNC: 1.7 MG/DL — LOW (ref 1.8–2.4)
MCHC RBC-ENTMCNC: 27.9 PG — SIGNIFICANT CHANGE UP (ref 27–31)
MCHC RBC-ENTMCNC: 33.6 G/DL — SIGNIFICANT CHANGE UP (ref 32–37)
MCV RBC AUTO: 82.9 FL — SIGNIFICANT CHANGE UP (ref 80–94)
METHOD TYPE: SIGNIFICANT CHANGE UP
MONOCYTES # BLD AUTO: 0.64 K/UL — HIGH (ref 0.1–0.6)
MONOCYTES NFR BLD AUTO: 7.1 % — SIGNIFICANT CHANGE UP (ref 1.7–9.3)
NEUTROPHILS # BLD AUTO: 7.45 K/UL — HIGH (ref 1.4–6.5)
NEUTROPHILS NFR BLD AUTO: 83.1 % — HIGH (ref 42.2–75.2)
NRBC # BLD: 0 /100 WBCS — SIGNIFICANT CHANGE UP (ref 0–0)
ORGANISM # SPEC MICROSCOPIC CNT: SIGNIFICANT CHANGE UP
ORGANISM # SPEC MICROSCOPIC CNT: SIGNIFICANT CHANGE UP
PLATELET # BLD AUTO: 255 K/UL — SIGNIFICANT CHANGE UP (ref 130–400)
POTASSIUM SERPL-MCNC: 3.5 MMOL/L — SIGNIFICANT CHANGE UP (ref 3.5–5)
POTASSIUM SERPL-SCNC: 3.5 MMOL/L — SIGNIFICANT CHANGE UP (ref 3.5–5)
RBC # BLD: 4.2 M/UL — LOW (ref 4.7–6.1)
RBC # FLD: 13.7 % — SIGNIFICANT CHANGE UP (ref 11.5–14.5)
SODIUM SERPL-SCNC: 136 MMOL/L — SIGNIFICANT CHANGE UP (ref 135–146)
SPECIMEN SOURCE: SIGNIFICANT CHANGE UP
WBC # BLD: 8.98 K/UL — SIGNIFICANT CHANGE UP (ref 4.8–10.8)
WBC # FLD AUTO: 8.98 K/UL — SIGNIFICANT CHANGE UP (ref 4.8–10.8)

## 2021-04-10 PROCEDURE — 71045 X-RAY EXAM CHEST 1 VIEW: CPT | Mod: 26

## 2021-04-10 PROCEDURE — 99233 SBSQ HOSP IP/OBS HIGH 50: CPT

## 2021-04-10 PROCEDURE — 99231 SBSQ HOSP IP/OBS SF/LOW 25: CPT

## 2021-04-10 PROCEDURE — 99232 SBSQ HOSP IP/OBS MODERATE 35: CPT

## 2021-04-10 RX ORDER — INSULIN GLARGINE 100 [IU]/ML
40 INJECTION, SOLUTION SUBCUTANEOUS AT BEDTIME
Refills: 0 | Status: DISCONTINUED | OUTPATIENT
Start: 2021-04-10 | End: 2021-04-11

## 2021-04-10 RX ORDER — MAGNESIUM SULFATE 500 MG/ML
2 VIAL (ML) INJECTION ONCE
Refills: 0 | Status: COMPLETED | OUTPATIENT
Start: 2021-04-10 | End: 2021-04-10

## 2021-04-10 RX ORDER — POTASSIUM CHLORIDE 20 MEQ
40 PACKET (EA) ORAL ONCE
Refills: 0 | Status: COMPLETED | OUTPATIENT
Start: 2021-04-10 | End: 2021-04-10

## 2021-04-10 RX ORDER — INSULIN LISPRO 100/ML
VIAL (ML) SUBCUTANEOUS
Refills: 0 | Status: DISCONTINUED | OUTPATIENT
Start: 2021-04-10 | End: 2021-04-10

## 2021-04-10 RX ORDER — INSULIN LISPRO 100/ML
VIAL (ML) SUBCUTANEOUS
Refills: 0 | Status: DISCONTINUED | OUTPATIENT
Start: 2021-04-10 | End: 2021-04-15

## 2021-04-10 RX ORDER — INSULIN GLARGINE 100 [IU]/ML
60 INJECTION, SOLUTION SUBCUTANEOUS AT BEDTIME
Refills: 0 | Status: DISCONTINUED | OUTPATIENT
Start: 2021-04-10 | End: 2021-04-10

## 2021-04-10 RX ORDER — FLUCONAZOLE 150 MG/1
TABLET ORAL
Refills: 0 | Status: DISCONTINUED | OUTPATIENT
Start: 2021-04-10 | End: 2021-04-12

## 2021-04-10 RX ORDER — INSULIN GLARGINE 100 [IU]/ML
50 INJECTION, SOLUTION SUBCUTANEOUS AT BEDTIME
Refills: 0 | Status: DISCONTINUED | OUTPATIENT
Start: 2021-04-10 | End: 2021-04-10

## 2021-04-10 RX ORDER — FLUCONAZOLE 150 MG/1
400 TABLET ORAL EVERY 24 HOURS
Refills: 0 | Status: DISCONTINUED | OUTPATIENT
Start: 2021-04-11 | End: 2021-04-12

## 2021-04-10 RX ORDER — LANOLIN ALCOHOL/MO/W.PET/CERES
5 CREAM (GRAM) TOPICAL AT BEDTIME
Refills: 0 | Status: DISCONTINUED | OUTPATIENT
Start: 2021-04-10 | End: 2021-04-15

## 2021-04-10 RX ORDER — INSULIN LISPRO 100/ML
15 VIAL (ML) SUBCUTANEOUS
Refills: 0 | Status: DISCONTINUED | OUTPATIENT
Start: 2021-04-10 | End: 2021-04-11

## 2021-04-10 RX ORDER — FLUCONAZOLE 150 MG/1
400 TABLET ORAL ONCE
Refills: 0 | Status: COMPLETED | OUTPATIENT
Start: 2021-04-10 | End: 2021-04-10

## 2021-04-10 RX ADMIN — HEPARIN SODIUM 5000 UNIT(S): 5000 INJECTION INTRAVENOUS; SUBCUTANEOUS at 14:46

## 2021-04-10 RX ADMIN — Medication 100 MILLIGRAM(S): at 05:25

## 2021-04-10 RX ADMIN — HEPARIN SODIUM 5000 UNIT(S): 5000 INJECTION INTRAVENOUS; SUBCUTANEOUS at 05:25

## 2021-04-10 RX ADMIN — Medication 100 MILLIGRAM(S): at 22:17

## 2021-04-10 RX ADMIN — MORPHINE SULFATE 2 MILLIGRAM(S): 50 CAPSULE, EXTENDED RELEASE ORAL at 12:19

## 2021-04-10 RX ADMIN — CEFEPIME 100 MILLIGRAM(S): 1 INJECTION, POWDER, FOR SOLUTION INTRAMUSCULAR; INTRAVENOUS at 17:01

## 2021-04-10 RX ADMIN — Medication 6: at 16:57

## 2021-04-10 RX ADMIN — CEFEPIME 100 MILLIGRAM(S): 1 INJECTION, POWDER, FOR SOLUTION INTRAMUSCULAR; INTRAVENOUS at 05:23

## 2021-04-10 RX ADMIN — Medication 50 GRAM(S): at 08:26

## 2021-04-10 RX ADMIN — ATORVASTATIN CALCIUM 40 MILLIGRAM(S): 80 TABLET, FILM COATED ORAL at 22:16

## 2021-04-10 RX ADMIN — Medication 5 MILLIGRAM(S): at 22:16

## 2021-04-10 RX ADMIN — Medication 2: at 11:44

## 2021-04-10 RX ADMIN — Medication 15 UNIT(S): at 07:47

## 2021-04-10 RX ADMIN — LINEZOLID 300 MILLIGRAM(S): 600 INJECTION, SOLUTION INTRAVENOUS at 05:25

## 2021-04-10 RX ADMIN — FLUCONAZOLE 100 MILLIGRAM(S): 150 TABLET ORAL at 16:57

## 2021-04-10 RX ADMIN — MORPHINE SULFATE 2 MILLIGRAM(S): 50 CAPSULE, EXTENDED RELEASE ORAL at 12:04

## 2021-04-10 RX ADMIN — Medication 1 APPLICATION(S): at 17:02

## 2021-04-10 RX ADMIN — CHLORHEXIDINE GLUCONATE 1 APPLICATION(S): 213 SOLUTION TOPICAL at 05:25

## 2021-04-10 RX ADMIN — Medication 15 UNIT(S): at 11:45

## 2021-04-10 RX ADMIN — Medication 40 MILLIEQUIVALENT(S): at 08:26

## 2021-04-10 RX ADMIN — LINEZOLID 300 MILLIGRAM(S): 600 INJECTION, SOLUTION INTRAVENOUS at 17:01

## 2021-04-10 RX ADMIN — Medication 1 APPLICATION(S): at 05:24

## 2021-04-10 RX ADMIN — Medication 100 MILLIGRAM(S): at 14:49

## 2021-04-10 RX ADMIN — Medication 15 UNIT(S): at 16:57

## 2021-04-10 RX ADMIN — HEPARIN SODIUM 5000 UNIT(S): 5000 INJECTION INTRAVENOUS; SUBCUTANEOUS at 22:16

## 2021-04-10 RX ADMIN — INSULIN GLARGINE 40 UNIT(S): 100 INJECTION, SOLUTION SUBCUTANEOUS at 22:32

## 2021-04-10 NOTE — PROGRESS NOTE ADULT - ASSESSMENT
60 years old male known to have uncontrolled DM2, diabetic retinopathy and neuropathy, HTN, morbid obesity, CKD 3, who called EMS after falling at home found to have DKA and sepsis from necrotising infected hematoma in groin vs abscess. Endo eval requested for uncontrolled DM and DKA.    # DKA likely in the setting of sepsis from infected groin hematoma vs abscess s/p debridement   - HbA1c >15.5 uncontrolled , ?compliance with home regimen   - received Lantus 30 units last night   - increase Lantus to 40 units every 24hours  at bedtime   - continue Admelog 15 units TIDAC , hold if NPO   - Change Admelog sliding scale to 2:50 >150 instead of 1:50 >150 TIDAC   - outpatient follow up with dr Cormier on discharge

## 2021-04-10 NOTE — PROGRESS NOTE ADULT - SUBJECTIVE AND OBJECTIVE BOX
Patient is a 60y old  Male who presents with a chief complaint of DKA (09 Apr 2021 14:23)        HPI:  60 years old male known to have uncontrolled DM2, diabetic retinopathy and neuropathy, HTN, morbid obesity, CKD 3, who called EMS today after falling at home yesterday and was unable to get up.     Pt on my examination is Alert and awake but does not recall the events leading to hosp adm. History obtained from ED staff and charts.  He was noted in the ED to be tachypneic with increased respiratory effort. In the ED the FS was initially undetectable, then > 600. Labs grossly deranged, including glucose of 687, WBC of 19.2, hyponatremic to 129, BUN/Cr 38/2.2. Beta-hydroxybuturate >9, consistent with DKA. HCO3 8 > 3 s/p 3 bicarbonate iv pushes, started on bicarbonate drip. ABGs showed PH 7.17 > 7.08. Pt received 3 L NS.    The ED was unable to obtain a temperature on arrival, he was bradycardic to 50 bpm, 130/70, saturating 95% on 6L NC initially but on my examination pt was > 92% on RA. His exam is notable for erythema of the groin below the panus as well as on other side of the penis and scrotum. Scrotum is indurated. There are numerous areas of skin excoriation with what appears to be purulent discharge. CT scan demonstrated edema/inflammatory changes in the soft tissues of the anterior pelvis involving the left groin and pubic region. Small 2.1 cm fluid pocket along the skin surface. No soft tissue gas identified. Patient was evaluated by Urology in the ED, team cultured areas of drainage. Differential from their standpoint is Fourniers vs. hidradenitis. Surgery Dr Hopkins evaluated the pt and recs it is superficial cellulitis and to Rx with iv abx.  could not do the medrec with the pt, done as per Dr Cormier's outpt notes (07 Apr 2021 18:20)      Pt evaluated on AM rounds.  I reviewed the radiology tests and hospital record prior to visiting the patient.    Interval Events: No overnight events.    REVIEW OF SYSTEMS:   see HPI      OBJECTIVE:  ICU Vital Signs Last 24 Hrs  T(C): 36.8 (10 Apr 2021 04:00), Max: 36.8 (10 Apr 2021 04:00)  T(F): 98.2 (10 Apr 2021 04:00), Max: 98.2 (10 Apr 2021 04:00)  HR: 72 (10 Apr 2021 07:00) (68 - 98)  BP: 112/60 (10 Apr 2021 07:00) (112/60 - 151/76)  BP(mean): 73 (10 Apr 2021 07:00) (71 - 108)  ABP: --  ABP(mean): --  RR: 11 (10 Apr 2021 07:00) (11 - 28)  SpO2: 96% (10 Apr 2021 07:00) (95% - 100%)        04-09 @ 07:01  -  04-10 @ 07:00  --------------------------------------------------------  IN: 3350 mL / OUT: 2243 mL / NET: 1107 mL      CAPILLARY BLOOD GLUCOSE      POCT Blood Glucose.: 142 mg/dL (10 Apr 2021 05:50)        PHYSICAL EXAM:     · CONSTITUTIONAL:   not septic appearing,   well nourished,   NAD    · ENMT:   Airway patent,   Nasal mucosa clear.  Mouth with normal mucosa.   No thrush    · EYES:   Clear bilaterally,   pupils equal,   round and reactive to light.    · CARDIAC:   Normal rate,   regular rhythm.    Heart sounds S1, S2.   No murmurs, no rubs or gallops on auscultation  no edema        CAROTID:   normal systolic impulse  no bruits    · RESPIRATORY:   rales  normal chest expansion  no retractions or use of accessory muscles  palpation of chest is normal with no fremitus  percussion of chest demonstrates no hyperresonance or dullness    · GASTROINTESTINAL:  Abdomen soft,   non-tender,   + BS  liver/spleen not palpable    · MUSCULOSKELETAL:   no clubbing, cyanosis      · NEUROLOGICAL:   awake alert oriented  no obvious cranial nerve abnormalities      · SKIN:   Skin normal color for race,   warm, dry   No evidence of rash.        · HEME LYMPH:   no splenomegaly.  No cervical  lymphadenopathy.  no inguinal lymphadenopathy    HOSPITAL MEDICATIONS:  MEDICATIONS  (STANDING):  atorvastatin 40 milliGRAM(s) Oral at bedtime  cefepime   IVPB 2000 milliGRAM(s) IV Intermittent every 12 hours  chlorhexidine 4% Liquid 1 Application(s) Topical <User Schedule>  clotrimazole 1% Cream 1 Application(s) Topical two times a day  dextrose 40% Gel 15 Gram(s) Oral once  dextrose 5%. 1000 milliLiter(s) (50 mL/Hr) IV Continuous <Continuous>  dextrose 5%. 1000 milliLiter(s) (100 mL/Hr) IV Continuous <Continuous>  dextrose 50% Injectable 25 Gram(s) IV Push once  dextrose 50% Injectable 12.5 Gram(s) IV Push once  dextrose 50% Injectable 25 Gram(s) IV Push once  glucagon  Injectable 1 milliGRAM(s) IntraMuscular once  heparin   Injectable 5000 Unit(s) SubCutaneous every 8 hours  insulin glargine Injectable (LANTUS) 60 Unit(s) SubCutaneous at bedtime  insulin lispro (ADMELOG) corrective regimen sliding scale   SubCutaneous three times a day before meals  insulin lispro Injectable (ADMELOG) 15 Unit(s) SubCutaneous three times a day before meals  linezolid  IVPB 600 milliGRAM(s) IV Intermittent every 12 hours  melatonin 5 milliGRAM(s) Oral at bedtime  metroNIDAZOLE  IVPB 500 milliGRAM(s) IV Intermittent every 8 hours    MEDICATIONS  (PRN):  morphine  - Injectable 2 milliGRAM(s) IV Push every 4 hours PRN Severe Pain (7 - 10)  ondansetron Injectable 4 milliGRAM(s) IV Push once PRN Nausea and/or Vomiting    dextrose 5% + sodium chloride 0.45%.: Solution, 1000 milliLiter(s) infuse at 250 mL/Hr  lactated ringers.: Solution, 1000 milliLiter(s) infuse at 75 mL/Hr  Provider's Contact #: [phone]  sodium chloride 0.9%: 1000 milliLiter(s)      with potassium chloride additive 20 milliEquivalent(s), infuse at 250 mL/Hr  dextrose 5% + sodium chloride 0.45%.: Solution, 1000 milliLiter(s) infuse at 150 mL/Hr  Provider's Contact #: [phone]  lactated ringers.: Solution, 1000 milliLiter(s) infuse at 75 mL/Hr  Provider's Contact #: [phone]  dextrose 5% + sodium chloride 0.45%.: Solution, 1000 milliLiter(s) infuse at 250 mL/Hr  sodium chloride 0.9% Bolus:   1000 milliLiter(s), IV Bolus, once, infuse over 60 Minute(s), Stop After 1 Doses  sodium chloride 0.9%: 1000 milliLiter(s)      with potassium chloride additive 40 milliEquivalent(s), infuse at 250 mL/Hr  lactated ringers Bolus:   1000 milliLiter(s), IV Bolus, once, infuse over 60 Minute(s), Stop After 1 Doses  Provider's Contact #: [phone]  lactated ringers Bolus:   1000 milliLiter(s), IV Bolus, once, infuse over 60 Minute(s), Stop After 1 Doses  Provider's Contact #: [phone]  lactated ringers Bolus:   1000 milliLiter(s), IV Bolus, once, infuse over 60 Minute(s), Stop After 1 Doses  Provider's Contact #: [phone]      LABS:                        11.7   8.98  )-----------( 255      ( 10 Apr 2021 05:24 )             34.8     04-10    136  |  105  |  18  ----------------------------<  158<H>  3.5   |  22  |  1.3    Ca    8.4<L>      10 Apr 2021 05:24  Phos  2.2     04-09  Mg     1.7     04-10    TPro  5.2<L>  /  Alb  2.9<L>  /  TBili  0.5  /  DBili  x   /  AST  19  /  ALT  15  /  AlkPhos  115  04-09    PT/INR - ( 09 Apr 2021 00:46 )   PT: 12.50 sec;   INR: 1.09 ratio         PTT - ( 09 Apr 2021 00:46 )  PTT:23.2 sec                      RADIOLOGY: Today I personally reviewed latest CXR and other pertinent films.

## 2021-04-10 NOTE — BEHAVIORAL HEALTH ASSESSMENT NOTE - NSBHCHARTREVIEWVS_PSY_A_CORE FT
ICU Vital Signs Last 24 Hrs  T(C): 36.9 (10 Apr 2021 08:38), Max: 36.9 (10 Apr 2021 08:00)  T(F): 98.4 (10 Apr 2021 08:00), Max: 98.4 (10 Apr 2021 08:00)  HR: 82 (10 Apr 2021 10:00) (68 - 98)  BP: 119/64 (10 Apr 2021 10:00) (112/60 - 151/76)  BP(mean): 83 (10 Apr 2021 10:00) (71 - 108)  ABP: --  ABP(mean): --  RR: 13 (10 Apr 2021 10:00) (11 - 28)  SpO2: 97% (10 Apr 2021 10:00) (95% - 100%)

## 2021-04-10 NOTE — BEHAVIORAL HEALTH ASSESSMENT NOTE - NSBHCHARTREVIEWLAB_PSY_A_CORE FT
11.7   8.98  )-----------( 255      ( 10 Apr 2021 05:24 )             34.8   04-10    136  |  105  |  18  ----------------------------<  158<H>  3.5   |  22  |  1.3    Ca    8.4<L>      10 Apr 2021 05:24  Phos  2.2     04-09  Mg     1.7     04-10    TPro  5.2<L>  /  Alb  2.9<L>  /  TBili  0.5  /  DBili  x   /  AST  19  /  ALT  15  /  AlkPhos  115  04-09

## 2021-04-10 NOTE — PROGRESS NOTE ADULT - ASSESSMENT
ASSESSMENT/ PLAN :   Stable  POD # 1 s/p debridement of pubis   Continue wound care/ dressing changes -WTD kerlix, cover with ABD pad/tape twice a day.        Plan to return to OR Monday 4/12, consented  Please pre-op Sunday night  - NPO after midnight; IVF while NPO  - Active T&S within 72 hrs  - Please give 1uPRBC since H&H <10/30  - Replete electrolytes to Mag >1.9, K >3.7  - EKG and CXR 1x per admission  - Hold any therapeutic anticoagulation (prophylactic okay)  Continue pain mgmt, VTE prophylaxis  Continue IV ABx per ID  Wound culture taken today, f/u results  PT/OT     Remainder of care per primary team  Plan of care discussed with patient, in agreement. Concerns addressed.

## 2021-04-10 NOTE — PROGRESS NOTE ADULT - SUBJECTIVE AND OBJECTIVE BOX
S: patient seen this am , feeling better, tolerating diet, no plans for further procedures now, off insulin drip   O:Vital Signs Last 24 Hrs  T(C): 36.9 (10 Apr 2021 08:38), Max: 36.9 (10 Apr 2021 08:00)  T(F): 98.4 (10 Apr 2021 08:00), Max: 98.4 (10 Apr 2021 08:00)  HR: 82 (10 Apr 2021 10:00) (68 - 98)  BP: 119/64 (10 Apr 2021 10:00) (112/60 - 151/76)  BP(mean): 83 (10 Apr 2021 10:00) (71 - 108)  RR: 13 (10 Apr 2021 10:00) (11 - 28)  SpO2: 97% (10 Apr 2021 10:00) (95% - 100%)    PE : limited due to COVID 19 pandemic   awake oriented , not in distress , responding adequately to questions,   abdomen, soft , non tender                           11.7   8.98  )-----------( 255      ( 10 Apr 2021 05:24 )             34.8   04-10    136  |  105  |  18  ----------------------------<  158<H>  3.5   |  22  |  1.3    Ca    8.4<L>      10 Apr 2021 05:24  Phos  2.2     04-09  Mg     1.7     04-10    TPro  5.2<L>  /  Alb  2.9<L>  /  TBili  0.5  /  DBili  x   /  AST  19  /  ALT  15  /  AlkPhos  115  04-09    CAPILLARY BLOOD GLUCOSE      POCT Blood Glucose.: 234 mg/dL (10 Apr 2021 11:38)  POCT Blood Glucose.: 142 mg/dL (10 Apr 2021 05:50)  POCT Blood Glucose.: 198 mg/dL (10 Apr 2021 00:02)  POCT Blood Glucose.: 139 mg/dL (09 Apr 2021 21:15)  POCT Blood Glucose.: 80 mg/dL (09 Apr 2021 19:06)  POCT Blood Glucose.: 75 mg/dL (09 Apr 2021 18:20)  POCT Blood Glucose.: 137 mg/dL (09 Apr 2021 15:48)  POCT Blood Glucose.: 83 mg/dL (09 Apr 2021 15:07)  POCT Blood Glucose.: 116 mg/dL (09 Apr 2021 13:08)  POCT Blood Glucose.: 84 mg/dL (09 Apr 2021 11:55)      MEDICATIONS  (STANDING):  atorvastatin 40 milliGRAM(s) Oral at bedtime  cefepime   IVPB 2000 milliGRAM(s) IV Intermittent every 12 hours  chlorhexidine 4% Liquid 1 Application(s) Topical <User Schedule>  clotrimazole 1% Cream 1 Application(s) Topical two times a day  dextrose 40% Gel 15 Gram(s) Oral once  dextrose 5%. 1000 milliLiter(s) (50 mL/Hr) IV Continuous <Continuous>  dextrose 5%. 1000 milliLiter(s) (100 mL/Hr) IV Continuous <Continuous>  dextrose 50% Injectable 25 Gram(s) IV Push once  dextrose 50% Injectable 12.5 Gram(s) IV Push once  dextrose 50% Injectable 25 Gram(s) IV Push once  glucagon  Injectable 1 milliGRAM(s) IntraMuscular once  heparin   Injectable 5000 Unit(s) SubCutaneous every 8 hours  insulin glargine Injectable (LANTUS) 60 Unit(s) SubCutaneous at bedtime  insulin lispro (ADMELOG) corrective regimen sliding scale   SubCutaneous three times a day before meals  insulin lispro Injectable (ADMELOG) 15 Unit(s) SubCutaneous three times a day before meals  linezolid  IVPB 600 milliGRAM(s) IV Intermittent every 12 hours  melatonin 5 milliGRAM(s) Oral at bedtime  metroNIDAZOLE  IVPB 500 milliGRAM(s) IV Intermittent every 8 hours    MEDICATIONS  (PRN):  morphine  - Injectable 2 milliGRAM(s) IV Push every 4 hours PRN Severe Pain (7 - 10)  ondansetron Injectable 4 milliGRAM(s) IV Push once PRN Nausea and/or Vomiting

## 2021-04-10 NOTE — BEHAVIORAL HEALTH ASSESSMENT NOTE - NSBHSOCIALHXDETAILSFT_PSY_A_CORE
Patient born and raised in [facility] in 1960. Family fled to [place] in 1971 to escape communism. 1974 moved to [place] and 1975 moved to [facility]. 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 Patient states he fell when doing work once and is currently on SSI to support self.

## 2021-04-10 NOTE — PRE-ANESTHESIA EVALUATION ADULT - NSANTHPMHFT_GEN_ALL_CORE
60 years old male known to have uncontrolled DM2, diabetic retinopathy and neuropathy, HTN, morbid obesity, CKD 3, who called EMS today after falling at home 4/8/2021 and was unable to get up.

## 2021-04-10 NOTE — BEHAVIORAL HEALTH ASSESSMENT NOTE - SUMMARY
Patient is a 60 year old  male,  from wife and children, on SSI, lives alone in house basement, pmh uncontrolled DM2, diabetic retinopathy and neuropathy, HTN, morbid obesity, and CKD 3 admitted to hospital for DKA management, psych consulted for evaluation of depression. Patient showing signs and symptoms of depression with passive suicidal ideation. At this time, he would benefit from continued psych evaluation and workup of depression.     Recommendations:  - 1:1 monitoring for high suicide risk  - Obtain TSH for depression work up  - At this time, patient showing sxs of depression and stating passive SI. Psych to follow for ongoing psych monitoring/evaluation.  - While pt has signs/sxs of depression, cannot rule out medical causes of depression, will hold off on psychopharm at this time until medical work up complete  - Social work/case management involvement to determine if patient will benefit from home health aid/begin process to obtain HHA for management of diabetes, kidney disease, etc.  - PT/OT when appropriate per primary team Patient is a 60 year old  male,  from wife and children, on SSI, lives alone in house basement, pmh uncontrolled DM2, diabetic retinopathy and neuropathy, HTN, morbid obesity, and CKD 3 admitted to hospital for DKA management, psych consulted for evaluation of depression.   Patient showing signs and symptoms of depression with passive suicidal ideation. This is in the context of acute medical illness, which can't be ruled out as cause for mood state. Patient will likely benefit from ongoing medical workup and intervention. Patient to be a heightened suicide risk, warranting continued psych monitoring and workup of depression.     Recommendations:  - 1:1 monitoring for high suicide risk  - Obtain TSH for depression work up  - At this time, patient showing sxs of depression and stating passive SI. Psych to follow for ongoing psych monitoring/evaluation - risk assessment and safety planning  - While pt has signs/sxs of depression, cannot rule out medical causes of depression, will hold off on psychopharm at this time until medical work up complete  - Social work/case management involvement to determine if patient will benefit from home health aid/begin process to obtain HHA for management of diabetes, kidney disease, etc.  - PT/OT when appropriate per primary team

## 2021-04-10 NOTE — BEHAVIORAL HEALTH ASSESSMENT NOTE - NSBHMEDSOTHERFT_PSY_A_CORE
Home Medications:  atorvastatin 40 mg oral tablet: 1 tab(s) orally once a day (07 Apr 2021 19:05)  gabapentin 600 mg oral tablet: 1 tab(s) orally 3 times a day (07 Apr 2021 19:05)  Jardiance 10 mg oral tablet: 1 tab(s) orally once a day (in the morning) (07 Apr 2021 19:05)  lisinopril 20 mg oral tablet: 1 tab(s) orally once a day (07 Apr 2021 19:05)  pioglitazone 30 mg oral tablet: 1 tab(s) orally once a day (07 Apr 2021 19:05)  Victoza 18 mg/3 mL subcutaneous solution:  (07 Apr 2021 19:05)

## 2021-04-10 NOTE — PROGRESS NOTE ADULT - SUBJECTIVE AND OBJECTIVE BOX
POD #1 s/p debridement of pubis   Pt: c/o mild pain during dressing change, patient tolerated well.  No acute events o/n    Vital Signs Last 24 Hrs  T(C): 36.3 (10 Apr 2021 15:00), Max: 36.9 (10 Apr 2021 08:00)  T(F): 97.3 (10 Apr 2021 15:00), Max: 98.4 (10 Apr 2021 08:00)  HR: 88 (10 Apr 2021 15:00) (68 - 98)  BP: 126/59 (10 Apr 2021 15:00) (112/60 - 151/76)  BP(mean): 103 (10 Apr 2021 12:00) (71 - 108)  RR: 20 (10 Apr 2021 15:00) (11 - 34)  SpO2: 98% (10 Apr 2021 12:00) (95% - 100%)        I&O's Summary    09 Apr 2021 07:01  -  10 Apr 2021 07:00  --------------------------------------------------------  IN: 3350 mL / OUT: 2418 mL / NET: 932 mL    10 Apr 2021 07:01  -  10 Apr 2021 16:41  --------------------------------------------------------  IN: 240 mL / OUT: 175 mL / NET: 65 mL                              11.7   8.98  )-----------( 255      ( 10 Apr 2021 05:24 )             34.8     CAPILLARY BLOOD GLUCOSE      POCT Blood Glucose.: 288 mg/dL (10 Apr 2021 16:15)  POCT Blood Glucose.: 234 mg/dL (10 Apr 2021 11:38)  POCT Blood Glucose.: 142 mg/dL (10 Apr 2021 05:50)  POCT Blood Glucose.: 198 mg/dL (10 Apr 2021 00:02)  POCT Blood Glucose.: 139 mg/dL (09 Apr 2021 21:15)  POCT Blood Glucose.: 80 mg/dL (09 Apr 2021 19:06)  POCT Blood Glucose.: 75 mg/dL (09 Apr 2021 18:20)      PHYSICAL EXAM:  GENERAL: NAD,   HEAD:  Atraumatic, Normocephalic  CHEST/LUNG: breathing comfortably on room air, speaking in full sentences w/o difficulty.   HEART: In no acute cardiopulmonary distress.   SKIN: Pubis: Large extensive wound, s/p debridement, pink and moist with areas of residual yellow necrotic tissue. no gross purulence noted. no malodor. no active bleeding evident.    Large dressing change performed. Patient tolerated well.  Wound culture also taken.

## 2021-04-10 NOTE — BEHAVIORAL HEALTH ASSESSMENT NOTE - RISK ASSESSMENT
High Acute Suicide Risk Risk factors: acute on chronic medical conditions, poor support, current depressed mood.  Protective factors: will to live, insight in need for help, supportive friend. Risk factors: questionable suicide attempt, current passive suicidal ideation, acute on chronic medical conditions, poor support, current depressed mood.  Protective factors: will to live, insight in need for help, supportive friend.

## 2021-04-10 NOTE — BEHAVIORAL HEALTH ASSESSMENT NOTE - HPI (INCLUDE ILLNESS QUALITY, SEVERITY, DURATION, TIMING, CONTEXT, MODIFYING FACTORS, ASSOCIATED SIGNS AND SYMPTOMS)
Patient is a 60 year old  male,  from wife and children, on SSI, lives alone in house basement, pmh uncontrolled DM2, diabetic retinopathy and neuropathy, HTN, morbid obesity, and CKD 3 presented after a fall admitted for DKA management, psych consulted for evaluation of depression. On approach, patient resting in bed, appears down, stating "I'm depressed." Patient reported he was BIBEMS after he told a friend he fell at home; however, he explained that he had consumed high amounts of sugar in an attempt to become comatose and die. Reports that he fell and was in his urine for 24 hours. He states that he believes it is better off if he falls asleep and never wakes up and that if he had a gun he would have killed himself 4-5 years ago. Patient reports significant difficulty caring for himself and feeling lonely and depressed. Has had difficulty sleeping, poor interest, feelings of guilt/worthlessness, low energy, psychomotor retardation, and feeling depressed with passive suicidal ideation. Patient currently lives alone in a basement without windows, receives SSI and food stamps to sustain himself, which he reports struggles with. He is estranged from wife and 3 adult children who live in [facility]. Current stressors include poor health and need for help to care for medical conditions, reported poor social support from sister who lives on [address], loss of contact from children who refuse to speak with him, feelings of loneliness/depression. States that if he were to leave the hospital without assistance for depression and medical care, he would kill himself.    Patient denies AVH. Denies HI. Denies feelings of elevated mood and anxiety.    Patient did not provide permission to speak with family, allows collateral from friend, Chace, information above.

## 2021-04-10 NOTE — PROGRESS NOTE ADULT - ASSESSMENT
IMPRESSION:   DKA/ DM: secondary to most likely groin cellulitis,   Groin Cellulitis  VALDEZ on CKD  HTN    SUGGEST:    long acting insulin   serial glucose monitoring  empiric abx as per ID, will f/u Surgery pt will go to OR for debridement again today which will be second day, continue supportive care, monitor for now.   tx IVF;s monitor for now.  continue HTN meds supportive care, monitor for now.  tx hep subcut,  lower ext US neg for DVT.  diet  IMPRESSION:   DKA/ DM: secondary to most likely groin cellulitis,   Groin Cellulitis  VALDEZ on CKD  HTN    SUGGEST:    long acting insulin   serial glucose monitoring  empiric abx as per ID, will f/u Surgery , continue supportive care, monitor for now.   tx IVF;s monitor for now.  continue HTN meds supportive care, monitor for now.  tx hep subcut,    lower ext US neg for DVT.  diet   stable for GMF

## 2021-04-11 DIAGNOSIS — F32.2 MAJOR DEPRESSIVE DISORDER, SINGLE EPISODE, SEVERE WITHOUT PSYCHOTIC FEATURES: ICD-10-CM

## 2021-04-11 LAB
-  AMPICILLIN/SULBACTAM: SIGNIFICANT CHANGE UP
-  AMPICILLIN: SIGNIFICANT CHANGE UP
-  CEFAZOLIN: SIGNIFICANT CHANGE UP
-  CLINDAMYCIN: SIGNIFICANT CHANGE UP
-  ERYTHROMYCIN: SIGNIFICANT CHANGE UP
-  GENTAMICIN: SIGNIFICANT CHANGE UP
-  OXACILLIN: SIGNIFICANT CHANGE UP
-  PENICILLIN: SIGNIFICANT CHANGE UP
-  RIFAMPIN: SIGNIFICANT CHANGE UP
-  TETRACYCLINE: SIGNIFICANT CHANGE UP
-  TETRACYCLINE: SIGNIFICANT CHANGE UP
-  TRIMETHOPRIM/SULFAMETHOXAZOLE: SIGNIFICANT CHANGE UP
-  VANCOMYCIN: SIGNIFICANT CHANGE UP
-  VANCOMYCIN: SIGNIFICANT CHANGE UP
ALBUMIN SERPL ELPH-MCNC: 2.6 G/DL — LOW (ref 3.5–5.2)
ALP SERPL-CCNC: 106 U/L — SIGNIFICANT CHANGE UP (ref 30–115)
ALT FLD-CCNC: 14 U/L — SIGNIFICANT CHANGE UP (ref 0–41)
ANION GAP SERPL CALC-SCNC: 11 MMOL/L — SIGNIFICANT CHANGE UP (ref 7–14)
AST SERPL-CCNC: 15 U/L — SIGNIFICANT CHANGE UP (ref 0–41)
BASOPHILS # BLD AUTO: 0.04 K/UL — SIGNIFICANT CHANGE UP (ref 0–0.2)
BASOPHILS NFR BLD AUTO: 0.5 % — SIGNIFICANT CHANGE UP (ref 0–1)
BILIRUB SERPL-MCNC: 0.5 MG/DL — SIGNIFICANT CHANGE UP (ref 0.2–1.2)
BUN SERPL-MCNC: 16 MG/DL — SIGNIFICANT CHANGE UP (ref 10–20)
CALCIUM SERPL-MCNC: 8.3 MG/DL — LOW (ref 8.5–10.1)
CHLORIDE SERPL-SCNC: 106 MMOL/L — SIGNIFICANT CHANGE UP (ref 98–110)
CO2 SERPL-SCNC: 22 MMOL/L — SIGNIFICANT CHANGE UP (ref 17–32)
CREAT SERPL-MCNC: 1.2 MG/DL — SIGNIFICANT CHANGE UP (ref 0.7–1.5)
EOSINOPHIL # BLD AUTO: 0.06 K/UL — SIGNIFICANT CHANGE UP (ref 0–0.7)
EOSINOPHIL NFR BLD AUTO: 0.8 % — SIGNIFICANT CHANGE UP (ref 0–8)
GLUCOSE BLDC GLUCOMTR-MCNC: 191 MG/DL — HIGH (ref 70–99)
GLUCOSE BLDC GLUCOMTR-MCNC: 217 MG/DL — HIGH (ref 70–99)
GLUCOSE BLDC GLUCOMTR-MCNC: 220 MG/DL — HIGH (ref 70–99)
GLUCOSE BLDC GLUCOMTR-MCNC: 274 MG/DL — HIGH (ref 70–99)
GLUCOSE SERPL-MCNC: 238 MG/DL — HIGH (ref 70–99)
HCT VFR BLD CALC: 34.5 % — LOW (ref 42–52)
HGB BLD-MCNC: 11.4 G/DL — LOW (ref 14–18)
IMM GRANULOCYTES NFR BLD AUTO: 1.4 % — HIGH (ref 0.1–0.3)
LYMPHOCYTES # BLD AUTO: 0.73 K/UL — LOW (ref 1.2–3.4)
LYMPHOCYTES # BLD AUTO: 9.6 % — LOW (ref 20.5–51.1)
MAGNESIUM SERPL-MCNC: 1.8 MG/DL — SIGNIFICANT CHANGE UP (ref 1.8–2.4)
MCHC RBC-ENTMCNC: 28 PG — SIGNIFICANT CHANGE UP (ref 27–31)
MCHC RBC-ENTMCNC: 33 G/DL — SIGNIFICANT CHANGE UP (ref 32–37)
MCV RBC AUTO: 84.8 FL — SIGNIFICANT CHANGE UP (ref 80–94)
METHOD TYPE: SIGNIFICANT CHANGE UP
METHOD TYPE: SIGNIFICANT CHANGE UP
MONOCYTES # BLD AUTO: 0.63 K/UL — HIGH (ref 0.1–0.6)
MONOCYTES NFR BLD AUTO: 8.3 % — SIGNIFICANT CHANGE UP (ref 1.7–9.3)
NEUTROPHILS # BLD AUTO: 6.05 K/UL — SIGNIFICANT CHANGE UP (ref 1.4–6.5)
NEUTROPHILS NFR BLD AUTO: 79.4 % — HIGH (ref 42.2–75.2)
NRBC # BLD: 0 /100 WBCS — SIGNIFICANT CHANGE UP (ref 0–0)
PHOSPHATE SERPL-MCNC: 2.4 MG/DL — SIGNIFICANT CHANGE UP (ref 2.1–4.9)
PLATELET # BLD AUTO: 254 K/UL — SIGNIFICANT CHANGE UP (ref 130–400)
POTASSIUM SERPL-MCNC: 4 MMOL/L — SIGNIFICANT CHANGE UP (ref 3.5–5)
POTASSIUM SERPL-SCNC: 4 MMOL/L — SIGNIFICANT CHANGE UP (ref 3.5–5)
PROT SERPL-MCNC: 4.8 G/DL — LOW (ref 6–8)
RBC # BLD: 4.07 M/UL — LOW (ref 4.7–6.1)
RBC # FLD: 13.3 % — SIGNIFICANT CHANGE UP (ref 11.5–14.5)
SARS-COV-2 RNA SPEC QL NAA+PROBE: SIGNIFICANT CHANGE UP
SODIUM SERPL-SCNC: 139 MMOL/L — SIGNIFICANT CHANGE UP (ref 135–146)
WBC # BLD: 7.62 K/UL — SIGNIFICANT CHANGE UP (ref 4.8–10.8)
WBC # FLD AUTO: 7.62 K/UL — SIGNIFICANT CHANGE UP (ref 4.8–10.8)

## 2021-04-11 PROCEDURE — 71045 X-RAY EXAM CHEST 1 VIEW: CPT | Mod: 26

## 2021-04-11 PROCEDURE — 99231 SBSQ HOSP IP/OBS SF/LOW 25: CPT

## 2021-04-11 PROCEDURE — 99233 SBSQ HOSP IP/OBS HIGH 50: CPT

## 2021-04-11 RX ORDER — ZOLPIDEM TARTRATE 10 MG/1
5 TABLET ORAL ONCE
Refills: 0 | Status: DISCONTINUED | OUTPATIENT
Start: 2021-04-11 | End: 2021-04-11

## 2021-04-11 RX ORDER — INSULIN GLARGINE 100 [IU]/ML
50 INJECTION, SOLUTION SUBCUTANEOUS AT BEDTIME
Refills: 0 | Status: DISCONTINUED | OUTPATIENT
Start: 2021-04-11 | End: 2021-04-12

## 2021-04-11 RX ORDER — CALCIUM CARBONATE 500(1250)
1 TABLET ORAL AT BEDTIME
Refills: 0 | Status: DISCONTINUED | OUTPATIENT
Start: 2021-04-11 | End: 2021-04-15

## 2021-04-11 RX ORDER — INSULIN LISPRO 100/ML
20 VIAL (ML) SUBCUTANEOUS
Refills: 0 | Status: DISCONTINUED | OUTPATIENT
Start: 2021-04-11 | End: 2021-04-12

## 2021-04-11 RX ORDER — LANOLIN ALCOHOL/MO/W.PET/CERES
5 CREAM (GRAM) TOPICAL AT BEDTIME
Refills: 0 | Status: DISCONTINUED | OUTPATIENT
Start: 2021-04-11 | End: 2021-04-11

## 2021-04-11 RX ADMIN — Medication 5 MILLIGRAM(S): at 21:28

## 2021-04-11 RX ADMIN — LINEZOLID 300 MILLIGRAM(S): 600 INJECTION, SOLUTION INTRAVENOUS at 05:08

## 2021-04-11 RX ADMIN — Medication 4: at 11:31

## 2021-04-11 RX ADMIN — Medication 100 MILLIGRAM(S): at 21:29

## 2021-04-11 RX ADMIN — MORPHINE SULFATE 2 MILLIGRAM(S): 50 CAPSULE, EXTENDED RELEASE ORAL at 11:35

## 2021-04-11 RX ADMIN — HEPARIN SODIUM 5000 UNIT(S): 5000 INJECTION INTRAVENOUS; SUBCUTANEOUS at 21:29

## 2021-04-11 RX ADMIN — Medication 20 UNIT(S): at 11:32

## 2021-04-11 RX ADMIN — Medication 15 UNIT(S): at 07:26

## 2021-04-11 RX ADMIN — CEFEPIME 100 MILLIGRAM(S): 1 INJECTION, POWDER, FOR SOLUTION INTRAMUSCULAR; INTRAVENOUS at 05:08

## 2021-04-11 RX ADMIN — Medication 1 APPLICATION(S): at 05:08

## 2021-04-11 RX ADMIN — Medication 100 MILLIGRAM(S): at 15:04

## 2021-04-11 RX ADMIN — INSULIN GLARGINE 50 UNIT(S): 100 INJECTION, SOLUTION SUBCUTANEOUS at 21:28

## 2021-04-11 RX ADMIN — Medication 6: at 07:26

## 2021-04-11 RX ADMIN — FLUCONAZOLE 100 MILLIGRAM(S): 150 TABLET ORAL at 16:23

## 2021-04-11 RX ADMIN — HEPARIN SODIUM 5000 UNIT(S): 5000 INJECTION INTRAVENOUS; SUBCUTANEOUS at 05:08

## 2021-04-11 RX ADMIN — ATORVASTATIN CALCIUM 40 MILLIGRAM(S): 80 TABLET, FILM COATED ORAL at 21:29

## 2021-04-11 RX ADMIN — HEPARIN SODIUM 5000 UNIT(S): 5000 INJECTION INTRAVENOUS; SUBCUTANEOUS at 15:27

## 2021-04-11 RX ADMIN — Medication 1 TABLET(S): at 21:29

## 2021-04-11 RX ADMIN — Medication 100 MILLIGRAM(S): at 05:08

## 2021-04-11 RX ADMIN — Medication 1 APPLICATION(S): at 17:53

## 2021-04-11 RX ADMIN — LINEZOLID 300 MILLIGRAM(S): 600 INJECTION, SOLUTION INTRAVENOUS at 17:53

## 2021-04-11 RX ADMIN — Medication 4: at 16:25

## 2021-04-11 RX ADMIN — ZOLPIDEM TARTRATE 5 MILLIGRAM(S): 10 TABLET ORAL at 02:28

## 2021-04-11 RX ADMIN — CEFEPIME 100 MILLIGRAM(S): 1 INJECTION, POWDER, FOR SOLUTION INTRAMUSCULAR; INTRAVENOUS at 17:53

## 2021-04-11 RX ADMIN — Medication 20 UNIT(S): at 16:24

## 2021-04-11 NOTE — PROGRESS NOTE ADULT - ASSESSMENT
60 years old male known to have uncontrolled DM2, diabetic retinopathy and neuropathy, HTN, morbid obesity, CKD 3, who called EMS after falling at home found to have DKA and sepsis from necrotising infected hematoma in groin vs abscess. Endo eval requested for uncontrolled DM and DKA.    # DKA likely in the setting of sepsis from infected groin hematoma vs abscess s/p debridement   - HbA1c >15.5 uncontrolled , ?compliance with home regimen   - increase Lantus to 50  30 units at bedtime    - Increase  Admelog to 20 units TIDAC , hold if NPO   -Continue  Admelog sliding scale to 2:50 >150 instead of 1:50 >150 TIDAC   - outpatient follow up with dr Cormier on discharge

## 2021-04-11 NOTE — PROGRESS NOTE ADULT - ASSESSMENT
pubic wound dressing change--.  granulating  wound --> no purulent drainage    iv abx, check cx, wound closure this week

## 2021-04-11 NOTE — PROGRESS NOTE BEHAVIORAL HEALTH - NSBHCHARTREVIEWINVESTIGATE_PSY_A_CORE FT
< from: 12 Lead ECG (04.08.21 @ 11:59) >      Ventricular Rate 93 BPM    Atrial Rate 93 BPM    P-R Interval 156 ms    QRS Duration 82 ms    Q-T Interval 402 ms    QTC Calculation(Bazett) 499 ms    P Axis 6 degrees    R Axis 14 degrees    T Axis 5 degrees    Diagnosis Line Normal sinus rhythm  Normal ECG      < end of copied text >

## 2021-04-11 NOTE — PROGRESS NOTE BEHAVIORAL HEALTH - NSBHFUPINTERVALHXFT_PSY_A_CORE
Chart reviewed, overnight there were no events or PRNs given. Per chart review pt is POD #1 s/p debridement of pubis, and due to return to the OR tomorrow with burn. Staff report the pt has been calm and cooperative. Staff report that overnight the pt was depressed and saying "I don't want to do this anymore" in reference to living. Today the upon encounter the pt was sleeping in bed with a 1:1 sit nearby. The pt reports not sleeping well overnight due to his apnea. Today the pt reports he is "fine" but then describes his mood as "not good". He says this is because "I'm all alone". He states he has family in [place] that he hasn't spoken to in 5 years, but recently reconnected with them. Despite this he states "I'm all by myself, no one loves me". He also states that "finances" have been a major stressor recently. He also reports tat he can not return to his windowless basement because his landlord is increasing the price, and states he feels homeless. He fails to identify any other recent stressors. He reports that this event was an attempt to end his life. He reports that he currently wants to die. When asked if he would like to kill himself he states "yes, but I don't know how". When asked if he would kill himself if he found a way he replies "Now? probably yes". When asked for his reason to live he replies that his brother in law is trying to find him an apartment. He fails to identify any other protective factors. He reports recently having difficulty sleeping, poor interest, feelings of hopelessness, guilt/worthlessness, low energy, and psychomotor retardation. He reports that he does not want to go to inpatient psychiatry because "I'm not crazy". Patient denies any homicidal ideation. Patient denies any auditory or visual hallucinations. Pt denies any symptoms of cough, sore throat, or fever.

## 2021-04-11 NOTE — PROGRESS NOTE ADULT - ASSESSMENT
Assessment and Plan:    DKA  Superficial cellulitis vs Hydradenitis left groin and pubic area      # DKA  # Metabolic acidosis  PH 7.17 > 7.08 on admission  HCO3 8 > 5 on admission  s/p insulin drip  long acting insulin, diet, continue to monitor    # Superficial purulent cellulitis vs Hydradenitis left groin and pubic area  s/p or by burn  empiric abx as per ID    # Hyponatremia  fu repeat bmp, monitor for now    # HTN  holding home meds for now      # CKD  avoid nephrotoxins      # Obesity          DVT PPx: heparin  GI ppx: not indicated  Diet: npo for now  Activity: bedrest

## 2021-04-11 NOTE — PROGRESS NOTE ADULT - SUBJECTIVE AND OBJECTIVE BOX
S: seen this am , feeling ok , eating good, transferred from ICU to floor   O:Vital Signs Last 24 Hrs  T(C): 36.5 (11 Apr 2021 08:00), Max: 36.9 (10 Apr 2021 12:00)  T(F): 97.7 (11 Apr 2021 08:00), Max: 98.4 (10 Apr 2021 12:00)  HR: 73 (11 Apr 2021 08:00) (72 - 90)  BP: 114/56 (11 Apr 2021 08:00) (114/56 - 134/79)  BP(mean): 103 (10 Apr 2021 12:00) (79 - 103)  RR: 18 (11 Apr 2021 08:00) (11 - 34)  SpO2: 98% (10 Apr 2021 12:00) (98% - 98%)    PE:  awake oriented , not in distress , responding adequately to questions,   abdomen, soft , non tender    04-11    139  |  106  |  16  ----------------------------<  238<H>  4.0   |  22  |  1.2    Ca    8.3<L>      11 Apr 2021 07:30  Phos  2.4     04-11  Mg     1.8     04-11    TPro  4.8<L>  /  Alb  2.6<L>  /  TBili  0.5  /  DBili  x   /  AST  15  /  ALT  14  /  AlkPhos  106  04-11      CAPILLARY BLOOD GLUCOSE      POCT Blood Glucose.: 274 mg/dL (11 Apr 2021 07:17)  POCT Blood Glucose.: 260 mg/dL (10 Apr 2021 22:22)  POCT Blood Glucose.: 288 mg/dL (10 Apr 2021 16:15)  POCT Blood Glucose.: 234 mg/dL (10 Apr 2021 11:38)    MEDICATIONS  (STANDING):  atorvastatin 40 milliGRAM(s) Oral at bedtime  cefepime   IVPB 2000 milliGRAM(s) IV Intermittent every 12 hours  chlorhexidine 4% Liquid 1 Application(s) Topical <User Schedule>  clotrimazole 1% Cream 1 Application(s) Topical two times a day  dextrose 40% Gel 15 Gram(s) Oral once  dextrose 5%. 1000 milliLiter(s) (50 mL/Hr) IV Continuous <Continuous>  dextrose 5%. 1000 milliLiter(s) (100 mL/Hr) IV Continuous <Continuous>  dextrose 50% Injectable 25 Gram(s) IV Push once  dextrose 50% Injectable 12.5 Gram(s) IV Push once  dextrose 50% Injectable 25 Gram(s) IV Push once  fluconAZOLE IVPB 400 milliGRAM(s) IV Intermittent every 24 hours  fluconAZOLE IVPB      glucagon  Injectable 1 milliGRAM(s) IntraMuscular once  heparin   Injectable 5000 Unit(s) SubCutaneous every 8 hours  insulin glargine Injectable (LANTUS) 40 Unit(s) SubCutaneous at bedtime  insulin lispro (ADMELOG) corrective regimen sliding scale   SubCutaneous three times a day before meals  insulin lispro Injectable (ADMELOG) 15 Unit(s) SubCutaneous three times a day before meals  linezolid  IVPB 600 milliGRAM(s) IV Intermittent every 12 hours  melatonin 5 milliGRAM(s) Oral at bedtime  metroNIDAZOLE  IVPB 500 milliGRAM(s) IV Intermittent every 8 hours    MEDICATIONS  (PRN):  morphine  - Injectable 2 milliGRAM(s) IV Push every 4 hours PRN Severe Pain (7 - 10)  ondansetron Injectable 4 milliGRAM(s) IV Push once PRN Nausea and/or Vomiting

## 2021-04-11 NOTE — PROGRESS NOTE ADULT - SUBJECTIVE AND OBJECTIVE BOX
SUBJECTIVE:    Patient is a 60y old Male who presents with a chief complaint of DKA (10 Apr 2021 16:39)    Currently admitted to medicine with the primary diagnosis of Diabetic ketoacidosis       Today is hospital day 4d. This morning he is resting comfortably in bed and reports no new issues or overnight events.     INTERVAL EVENTS:     PAST MEDICAL & SURGICAL HISTORY  Diabetes mellitus    Dyslipidemia    Hypertension    No significant past surgical history        ALLERGIES:  No Known Allergies    MEDICATIONS:  STANDING MEDICATIONS  atorvastatin 40 milliGRAM(s) Oral at bedtime  cefepime   IVPB 2000 milliGRAM(s) IV Intermittent every 12 hours  chlorhexidine 4% Liquid 1 Application(s) Topical <User Schedule>  clotrimazole 1% Cream 1 Application(s) Topical two times a day  dextrose 40% Gel 15 Gram(s) Oral once  dextrose 5%. 1000 milliLiter(s) IV Continuous <Continuous>  dextrose 5%. 1000 milliLiter(s) IV Continuous <Continuous>  dextrose 50% Injectable 25 Gram(s) IV Push once  dextrose 50% Injectable 12.5 Gram(s) IV Push once  dextrose 50% Injectable 25 Gram(s) IV Push once  fluconAZOLE IVPB 400 milliGRAM(s) IV Intermittent every 24 hours  fluconAZOLE IVPB      glucagon  Injectable 1 milliGRAM(s) IntraMuscular once  heparin   Injectable 5000 Unit(s) SubCutaneous every 8 hours  insulin glargine Injectable (LANTUS) 40 Unit(s) SubCutaneous at bedtime  insulin lispro (ADMELOG) corrective regimen sliding scale   SubCutaneous three times a day before meals  insulin lispro Injectable (ADMELOG) 15 Unit(s) SubCutaneous three times a day before meals  linezolid  IVPB 600 milliGRAM(s) IV Intermittent every 12 hours  melatonin 5 milliGRAM(s) Oral at bedtime  metroNIDAZOLE  IVPB 500 milliGRAM(s) IV Intermittent every 8 hours    PRN MEDICATIONS  morphine  - Injectable 2 milliGRAM(s) IV Push every 4 hours PRN  ondansetron Injectable 4 milliGRAM(s) IV Push once PRN    VITALS:   T(F): 98.3  HR: 80  BP: 115/58  RR: 20  SpO2: 98%    LABS:                        11.7   8.98  )-----------( 255      ( 10 Apr 2021 05:24 )             34.8     04-10    136  |  105  |  18  ----------------------------<  158<H>  3.5   |  22  |  1.3    Ca    8.4<L>      10 Apr 2021 05:24  Phos  2.2     04-09  Mg     1.7     04-10    TPro  5.2<L>  /  Alb  2.9<L>  /  TBili  0.5  /  DBili  x   /  AST  19  /  ALT  15  /  AlkPhos  115  04-09              Culture - Fungal, Tissue (collected 08 Apr 2021 17:14)  Source: .Tissue PUBIS  Preliminary Report (09 Apr 2021 08:48):    Testing in progress    Culture - Tissue with Gram Stain (collected 08 Apr 2021 17:14)  Source: .Tissue PUBIS  Gram Stain (09 Apr 2021 20:52):    Upon re-evaluation of gram stain:    Few polymorphonuclear leukocytes seen per low power field    Numerous Gram Variable Rods seen per oil power field    Rare Yeast like cells seen per oil power field  Preliminary Report (09 Apr 2021 20:36):    Moderate Liss albicans "Susceptibilities not performed"    Moderate Lactobacillus species "Susceptibilities not performed"    Culture - Fungal, Tissue (collected 08 Apr 2021 17:13)  Source: .Tissue PUBIS  Preliminary Report (09 Apr 2021 08:48):    Testing in progress    Culture - Tissue with Gram Stain (collected 08 Apr 2021 17:13)  Source: .Tissue PUBIS  Gram Stain (10 Apr 2021 18:54):    Upon re-evaluation of gram stain:    Numerous polymorphonuclear leukocytes seen per low power field    Numerous Gram Variable Rods seen per oil power field    Rare Gram positive cocci in pairs seen per oil power field    Moderate Yeast like cells seen per oil power field  Preliminary Report (10 Apr 2021 18:55):    Moderate Enterococcus faecalis    Moderate Staphylococcus epidermidis    Moderate Liss albicans "Susceptibilities not performed"    Numerous Lactobacillus species "Susceptibilities not performed"          RADIOLOGY:    PHYSICAL EXAM:  GEN: No acute distress  PULM/CHEST: Clear to auscultation bilaterally, no rales, rhonchi or wheezes   CVS: Regular rate and rhythm, S1-S2, no murmurs  ABD: Soft, non-tender, non-distended, +BS  EXT: No edema  NEURO: AAOx3    Latif Catheter:   Indwelling Urethral Catheter:     Connect To:  Straight Drainage/[place]    Indication:  Urinary Retention / Obstruction (04-11-21 @ 05:16) (not performed) [Active]  Indwelling Urethral Catheter:     Connect To:  Straight Drainage/[place]    Indication:  Urinary Retention / Obstruction (04-10-21 @ 00:17) (not performed) [Active]  Indwelling Urethral Catheter:     Connect To:  Straight Drainage/Gravity    Indication:  Urine Output Monitoring in Critically Ill (04-09-21 @ 17:45) (not performed) [Active]

## 2021-04-12 ENCOUNTER — RESULT REVIEW (OUTPATIENT)
Age: 61
End: 2021-04-12

## 2021-04-12 LAB
ALBUMIN SERPL ELPH-MCNC: 2.8 G/DL — LOW (ref 3.5–5.2)
ALP SERPL-CCNC: 137 U/L — HIGH (ref 30–115)
ALT FLD-CCNC: 40 U/L — SIGNIFICANT CHANGE UP (ref 0–41)
ANION GAP SERPL CALC-SCNC: 12 MMOL/L — SIGNIFICANT CHANGE UP (ref 7–14)
AST SERPL-CCNC: 70 U/L — HIGH (ref 0–41)
BASOPHILS # BLD AUTO: 0.04 K/UL — SIGNIFICANT CHANGE UP (ref 0–0.2)
BASOPHILS NFR BLD AUTO: 0.5 % — SIGNIFICANT CHANGE UP (ref 0–1)
BILIRUB SERPL-MCNC: 0.5 MG/DL — SIGNIFICANT CHANGE UP (ref 0.2–1.2)
BUN SERPL-MCNC: 16 MG/DL — SIGNIFICANT CHANGE UP (ref 10–20)
CALCIUM SERPL-MCNC: 9.1 MG/DL — SIGNIFICANT CHANGE UP (ref 8.5–10.1)
CHLORIDE SERPL-SCNC: 104 MMOL/L — SIGNIFICANT CHANGE UP (ref 98–110)
CO2 SERPL-SCNC: 26 MMOL/L — SIGNIFICANT CHANGE UP (ref 17–32)
CREAT SERPL-MCNC: 1.3 MG/DL — SIGNIFICANT CHANGE UP (ref 0.7–1.5)
CULTURE RESULTS: SIGNIFICANT CHANGE UP
EOSINOPHIL # BLD AUTO: 0.07 K/UL — SIGNIFICANT CHANGE UP (ref 0–0.7)
EOSINOPHIL NFR BLD AUTO: 0.8 % — SIGNIFICANT CHANGE UP (ref 0–8)
GLUCOSE BLDC GLUCOMTR-MCNC: 156 MG/DL — HIGH (ref 70–99)
GLUCOSE BLDC GLUCOMTR-MCNC: 166 MG/DL — HIGH (ref 70–99)
GLUCOSE BLDC GLUCOMTR-MCNC: 316 MG/DL — HIGH (ref 70–99)
GLUCOSE SERPL-MCNC: 117 MG/DL — HIGH (ref 70–99)
HCT VFR BLD CALC: 37.1 % — LOW (ref 42–52)
HGB BLD-MCNC: 12 G/DL — LOW (ref 14–18)
IMM GRANULOCYTES NFR BLD AUTO: 1.3 % — HIGH (ref 0.1–0.3)
LYMPHOCYTES # BLD AUTO: 1.06 K/UL — LOW (ref 1.2–3.4)
LYMPHOCYTES # BLD AUTO: 12.5 % — LOW (ref 20.5–51.1)
MAGNESIUM SERPL-MCNC: 1.8 MG/DL — SIGNIFICANT CHANGE UP (ref 1.8–2.4)
MCHC RBC-ENTMCNC: 27.7 PG — SIGNIFICANT CHANGE UP (ref 27–31)
MCHC RBC-ENTMCNC: 32.3 G/DL — SIGNIFICANT CHANGE UP (ref 32–37)
MCV RBC AUTO: 85.7 FL — SIGNIFICANT CHANGE UP (ref 80–94)
MONOCYTES # BLD AUTO: 0.8 K/UL — HIGH (ref 0.1–0.6)
MONOCYTES NFR BLD AUTO: 9.4 % — HIGH (ref 1.7–9.3)
NEUTROPHILS # BLD AUTO: 6.39 K/UL — SIGNIFICANT CHANGE UP (ref 1.4–6.5)
NEUTROPHILS NFR BLD AUTO: 75.5 % — HIGH (ref 42.2–75.2)
NRBC # BLD: 0 /100 WBCS — SIGNIFICANT CHANGE UP (ref 0–0)
PHOSPHATE SERPL-MCNC: 3.1 MG/DL — SIGNIFICANT CHANGE UP (ref 2.1–4.9)
PLATELET # BLD AUTO: 293 K/UL — SIGNIFICANT CHANGE UP (ref 130–400)
POTASSIUM SERPL-MCNC: 5.1 MMOL/L — HIGH (ref 3.5–5)
POTASSIUM SERPL-SCNC: 5.1 MMOL/L — HIGH (ref 3.5–5)
PROT SERPL-MCNC: 5.1 G/DL — LOW (ref 6–8)
RBC # BLD: 4.33 M/UL — LOW (ref 4.7–6.1)
RBC # FLD: 13 % — SIGNIFICANT CHANGE UP (ref 11.5–14.5)
SODIUM SERPL-SCNC: 142 MMOL/L — SIGNIFICANT CHANGE UP (ref 135–146)
SPECIMEN SOURCE: SIGNIFICANT CHANGE UP
TSH SERPL-MCNC: 0.3 UIU/ML — SIGNIFICANT CHANGE UP (ref 0.27–4.2)
WBC # BLD: 8.47 K/UL — SIGNIFICANT CHANGE UP (ref 4.8–10.8)
WBC # FLD AUTO: 8.47 K/UL — SIGNIFICANT CHANGE UP (ref 4.8–10.8)

## 2021-04-12 PROCEDURE — 11046 DBRDMT MUSC&/FSCA EA ADDL: CPT

## 2021-04-12 PROCEDURE — 11043 DBRDMT MUSC&/FSCA 1ST 20/<: CPT | Mod: 58

## 2021-04-12 PROCEDURE — 99233 SBSQ HOSP IP/OBS HIGH 50: CPT

## 2021-04-12 PROCEDURE — 99233 SBSQ HOSP IP/OBS HIGH 50: CPT | Mod: GC

## 2021-04-12 RX ORDER — INSULIN LISPRO 100/ML
20 VIAL (ML) SUBCUTANEOUS
Refills: 0 | Status: DISCONTINUED | OUTPATIENT
Start: 2021-04-12 | End: 2021-04-13

## 2021-04-12 RX ORDER — HYDROMORPHONE HYDROCHLORIDE 2 MG/ML
0.5 INJECTION INTRAMUSCULAR; INTRAVENOUS; SUBCUTANEOUS
Refills: 0 | Status: DISCONTINUED | OUTPATIENT
Start: 2021-04-12 | End: 2021-04-12

## 2021-04-12 RX ORDER — ATORVASTATIN CALCIUM 80 MG/1
40 TABLET, FILM COATED ORAL AT BEDTIME
Refills: 0 | Status: DISCONTINUED | OUTPATIENT
Start: 2021-04-12 | End: 2021-04-15

## 2021-04-12 RX ORDER — AMPICILLIN SODIUM AND SULBACTAM SODIUM 250; 125 MG/ML; MG/ML
INJECTION, POWDER, FOR SUSPENSION INTRAMUSCULAR; INTRAVENOUS
Refills: 0 | Status: DISCONTINUED | OUTPATIENT
Start: 2021-04-12 | End: 2021-04-12

## 2021-04-12 RX ORDER — INSULIN LISPRO 100/ML
VIAL (ML) SUBCUTANEOUS
Refills: 0 | Status: DISCONTINUED | OUTPATIENT
Start: 2021-04-12 | End: 2021-04-14

## 2021-04-12 RX ORDER — DEXTROSE 50 % IN WATER 50 %
25 SYRINGE (ML) INTRAVENOUS ONCE
Refills: 0 | Status: DISCONTINUED | OUTPATIENT
Start: 2021-04-12 | End: 2021-04-15

## 2021-04-12 RX ORDER — HEPARIN SODIUM 5000 [USP'U]/ML
5000 INJECTION INTRAVENOUS; SUBCUTANEOUS EVERY 8 HOURS
Refills: 0 | Status: DISCONTINUED | OUTPATIENT
Start: 2021-04-12 | End: 2021-04-15

## 2021-04-12 RX ORDER — DEXTROSE 50 % IN WATER 50 %
12.5 SYRINGE (ML) INTRAVENOUS ONCE
Refills: 0 | Status: DISCONTINUED | OUTPATIENT
Start: 2021-04-12 | End: 2021-04-15

## 2021-04-12 RX ORDER — SODIUM CHLORIDE 9 MG/ML
1000 INJECTION, SOLUTION INTRAVENOUS
Refills: 0 | Status: DISCONTINUED | OUTPATIENT
Start: 2021-04-12 | End: 2021-04-12

## 2021-04-12 RX ORDER — DEXTROSE 50 % IN WATER 50 %
15 SYRINGE (ML) INTRAVENOUS ONCE
Refills: 0 | Status: DISCONTINUED | OUTPATIENT
Start: 2021-04-12 | End: 2021-04-15

## 2021-04-12 RX ORDER — AMPICILLIN SODIUM AND SULBACTAM SODIUM 250; 125 MG/ML; MG/ML
3 INJECTION, POWDER, FOR SUSPENSION INTRAMUSCULAR; INTRAVENOUS EVERY 6 HOURS
Refills: 0 | Status: DISCONTINUED | OUTPATIENT
Start: 2021-04-12 | End: 2021-04-12

## 2021-04-12 RX ORDER — GLUCAGON INJECTION, SOLUTION 0.5 MG/.1ML
1 INJECTION, SOLUTION SUBCUTANEOUS ONCE
Refills: 0 | Status: DISCONTINUED | OUTPATIENT
Start: 2021-04-12 | End: 2021-04-15

## 2021-04-12 RX ORDER — INSULIN GLARGINE 100 [IU]/ML
50 INJECTION, SOLUTION SUBCUTANEOUS AT BEDTIME
Refills: 0 | Status: DISCONTINUED | OUTPATIENT
Start: 2021-04-12 | End: 2021-04-13

## 2021-04-12 RX ORDER — CHLORHEXIDINE GLUCONATE 213 G/1000ML
1 SOLUTION TOPICAL
Refills: 0 | Status: DISCONTINUED | OUTPATIENT
Start: 2021-04-12 | End: 2021-04-15

## 2021-04-12 RX ORDER — AMPICILLIN SODIUM AND SULBACTAM SODIUM 250; 125 MG/ML; MG/ML
3 INJECTION, POWDER, FOR SUSPENSION INTRAMUSCULAR; INTRAVENOUS EVERY 6 HOURS
Refills: 0 | Status: DISCONTINUED | OUTPATIENT
Start: 2021-04-12 | End: 2021-04-14

## 2021-04-12 RX ORDER — LACTULOSE 10 G/15ML
10 SOLUTION ORAL EVERY 6 HOURS
Refills: 0 | Status: DISCONTINUED | OUTPATIENT
Start: 2021-04-12 | End: 2021-04-15

## 2021-04-12 RX ORDER — ONDANSETRON 8 MG/1
4 TABLET, FILM COATED ORAL ONCE
Refills: 0 | Status: DISCONTINUED | OUTPATIENT
Start: 2021-04-12 | End: 2021-04-15

## 2021-04-12 RX ORDER — AMPICILLIN SODIUM AND SULBACTAM SODIUM 250; 125 MG/ML; MG/ML
3 INJECTION, POWDER, FOR SUSPENSION INTRAMUSCULAR; INTRAVENOUS ONCE
Refills: 0 | Status: COMPLETED | OUTPATIENT
Start: 2021-04-12 | End: 2021-04-12

## 2021-04-12 RX ORDER — FLUCONAZOLE 150 MG/1
400 TABLET ORAL EVERY 24 HOURS
Refills: 0 | Status: DISCONTINUED | OUTPATIENT
Start: 2021-04-12 | End: 2021-04-13

## 2021-04-12 RX ADMIN — LINEZOLID 300 MILLIGRAM(S): 600 INJECTION, SOLUTION INTRAVENOUS at 07:09

## 2021-04-12 RX ADMIN — Medication 5 MILLIGRAM(S): at 21:15

## 2021-04-12 RX ADMIN — Medication 1 APPLICATION(S): at 05:32

## 2021-04-12 RX ADMIN — CEFEPIME 100 MILLIGRAM(S): 1 INJECTION, POWDER, FOR SOLUTION INTRAMUSCULAR; INTRAVENOUS at 06:26

## 2021-04-12 RX ADMIN — Medication 100 MILLIGRAM(S): at 05:31

## 2021-04-12 RX ADMIN — SODIUM CHLORIDE 75 MILLILITER(S): 9 INJECTION, SOLUTION INTRAVENOUS at 21:17

## 2021-04-12 RX ADMIN — INSULIN GLARGINE 50 UNIT(S): 100 INJECTION, SOLUTION SUBCUTANEOUS at 21:15

## 2021-04-12 RX ADMIN — CHLORHEXIDINE GLUCONATE 1 APPLICATION(S): 213 SOLUTION TOPICAL at 05:32

## 2021-04-12 RX ADMIN — HEPARIN SODIUM 5000 UNIT(S): 5000 INJECTION INTRAVENOUS; SUBCUTANEOUS at 21:14

## 2021-04-12 RX ADMIN — HEPARIN SODIUM 5000 UNIT(S): 5000 INJECTION INTRAVENOUS; SUBCUTANEOUS at 05:32

## 2021-04-12 RX ADMIN — AMPICILLIN SODIUM AND SULBACTAM SODIUM 200 GRAM(S): 250; 125 INJECTION, POWDER, FOR SUSPENSION INTRAMUSCULAR; INTRAVENOUS at 15:37

## 2021-04-12 RX ADMIN — ATORVASTATIN CALCIUM 40 MILLIGRAM(S): 80 TABLET, FILM COATED ORAL at 21:15

## 2021-04-12 RX ADMIN — Medication 1 TABLET(S): at 21:14

## 2021-04-12 NOTE — PROGRESS NOTE ADULT - ASSESSMENT
Assessment and Plan:    60 year old male with uncontrolled DM2, diabetic retinopathy, and neuropathy, HTN, morbid obesity,     # Necrotizing pubic abscess, sepsis present on admission  - May have contributed in being a trigger for DKA.   - Burn taking the patient for another surgical debridement today. s/p debridement on 4/8 and 410. Continue NPO for procedure, keep insulin gtt for now.   - Blood cultures show NGT   - ID evaluated the patient, continue with Zyvox 600mg IV q12h, Cefepime 2g IV q12h, Flagyl 500mg q8h.   - Surgical abscess cultures show candida albicans  - Follow up with ID    # Depression with active suicidal ideation   - As per patient's sister, patient was depressed and not taking care of himself. Evaluated by psych and placed on a 1:1 sit.   - Follow up with psych     # Metabolic acidosis secondary to DKA   - PH 7.17 > 7.08, HCO3 8 > 5, B-OH>9.0   - HbA1c >15.5% possible medication noncompliance. follows with Dr. Vick OP   - trop negative x2  - s/p insulin gtt. Now on subq insulin and tolerating diet   - Follow up lytes and replete    # pseudo hyponatremia on admission - resolved   - Na corrected for Glucose was 141 on admiossion likely secondary to hyperglycemia    # HTN - BP low, holding home meds for now    # VALDEZ on CKD - improved fluids, likely prerenal    # Obesity    DVT PPx: heparin sq  GI ppx: not indicated  Diet: npo for now  Activity: bedrest   Assessment and Plan:  60 year old male with uncontrolled DM2, diabetic retinopathy, and neuropathy, HTN, morbid obesity,     # Necrotizing pubic abscess, sepsis present on admission  - May have contributed in being a trigger for DKA.   - Burn taking the patient for surgery 4/12. s/p debridement on 4/8 and 410. Continue NPO for procedure.   - Blood cultures show NGT   - ID evaluated the patient, continue with Zyvox 600mg IV q12h, Cefepime 2g IV q12h, Flagyl 500mg q8h.   - Surgical abscess cultures show candida albicans, on fluconazole   - Follow up with ID    # Depression with active suicidal ideation   - As per patient's sister, patient was depressed and not taking care of himself. Evaluated by psych and placed on a 1:1 sit.   - Follow up with psych     # Metabolic acidosis secondary to DKA   - PH 7.17 > 7.08, HCO3 8 > 5, B-OH>9.0   - HbA1c >15.5% possible medication noncompliance. follows with Dr. Vick OP   - trop negative x2  - s/p insulin gtt. Now on subq insulin 50/20/20/20 and tolerating diet   - Follow up lytes and replete    # pseudo hyponatremia on admission - resolved   - Na corrected for Glucose was 141 on admiossion likely secondary to hyperglycemia    # HTN - BP low, holding home meds for now    # VALDEZ on CKD - improved fluids, likely prerenal    # Obesity    DVT PPx: heparin sq  GI ppx: not indicated  Diet: npo for sx   Activity: IAT

## 2021-04-12 NOTE — PROGRESS NOTE ADULT - ASSESSMENT
· Assessment	  60 years old male known to have uncontrolled DM2, diabetic retinopathy and neuropathy, HTN, morbid obesity, CKD 3, who called EMS today after falling at home yesterday and was unable to get up.   He was noted in the ED to be tachypneic with increased respiratory effort. In the ED the FS was initially undetectable, then > 600. Labs grossly deranged, including glucose of 687, WBC of 19.2, hyponatremic to 129, BUN/Cr 38/2.2. Beta-hydroxybuturate >9, consistent with DKA. HCO3 8 > 3 s/p 3 bicarbonate iv pushes, started on bicarbonate drip. ABGs showed PH 7.17 > 7.08. Pt received 3 L NS.  The ED was unable to obtain a temperature on arrival, he was bradycardic to 50 bpm, 130/70, saturating 95% on 6L NC.  CT scan demonstrated edema/inflammatory changes in the soft tissues of the anterior pelvis involving the left groin and pubic region. Small 2.1 cm fluid pocket along the skin surface. No soft tissue gas identified.     IMPRESSION;  Resolved Sepsis secondary to a complex multiloculated abscess pelvic area/ left groin  4/8 S/p dmultiple drainage of abscess  Prestnly wound with no evidence of ongoing infection  Gm stain : MAYITO, E fecalis, CHNS, C albicans, GNRs. All colonizers and have no therapeutic implications  WBC 20.5>11.7>8.4  Scrotum not infected  Perineum with no infection  Renal failure : resolved  Latif placed 4/8  BCx 4/7 NGTD    RECOMMENDATIONS;  Unasyn 3 gm iv q6h  d/c other ABx  Will change to po ABx post closure of the wound

## 2021-04-12 NOTE — PROGRESS NOTE ADULT - ASSESSMENT
60 years old male known to have uncontrolled DM2, diabetic retinopathy and neuropathy, HTN, morbid obesity, CKD 3, who called EMS after falling at home  and was unable to get up.       # Sepsis POA resolved  # Left pubic abscess  -  CT Abdomen and Pelvis w/ IV Cont (04.07.21 @ 16:05) >Edema/inflammatory changes in the soft tissues of the anterior pelvis involving the left groin and pubic region. Small 2.1 cm fluid pocket along the skin surface. No soft tissue gas identified.  - blood Culture Results: No growth to date. (04.07.21 @ 12:08)  - wound Culture Results: Few Candida albicans "Susceptibilities not performed" (04.10.21 @ 14:00)  -  s/p debridement on 4/8 and 410.   - c/w  Zyvox 600mg IV q12h, Cefepime 2g IV q12h, Flagyl 500mg q8h.   - c/w fluconazole  - Planned for further debridement today    # Depression with active suicidal ideation   - evaluated by psychiatry: depression with active suicidal ideation. This is in the context of social stressors. Patient is at an acute heightened suicide risk, warranting continued psych monitoring and possible admission after medical clearance.   - c/w 1:1 monitoring for high suicide risk  - Psych F/u    # DKA-resolved  # DM type2  - A1C with Estimated Average Glucose Result: >15.5: % (04.08.21 @ 04:53)  - evaluated by endocrine  - c/w lantus, lispro    # Hypertension- stable  - holding lisinopril    # Dyslipidemia  - c/w statin    # Obesity  - BMI: 36    # DVT prophylaxis    # Full code    #Pending: planned for further debridement of left pubic abscess  # Discussed plan of care with patient  # Disposition: TBD

## 2021-04-12 NOTE — PROGRESS NOTE ADULT - SUBJECTIVE AND OBJECTIVE BOX
Reason for Endocrinology Consult: Diabetes    HPI: 60y Male      PAST MEDICAL & SURGICAL HISTORY:  Diabetes mellitus    Dyslipidemia    Hypertension    No significant past surgical history      FAMILY HISTORY:      SH:  Smoking  Etoh:  Recreational Drugs:    Home Medications:  atorvastatin 40 mg oral tablet: 1 tab(s) orally once a day (07 Apr 2021 19:05)  gabapentin 600 mg oral tablet: 1 tab(s) orally 3 times a day (07 Apr 2021 19:05)  Jardiance 10 mg oral tablet: 1 tab(s) orally once a day (in the morning) (07 Apr 2021 19:05)  lisinopril 20 mg oral tablet: 1 tab(s) orally once a day (07 Apr 2021 19:05)  pioglitazone 30 mg oral tablet: 1 tab(s) orally once a day (07 Apr 2021 19:05)  Victoza 18 mg/3 mL subcutaneous solution:  (07 Apr 2021 19:05)      Current (Non-Endocrine) Meds:  ampicillin/sulbactam  IVPB 3 Gram(s) IV Intermittent every 6 hours  calcium carbonate    500 mG (Tums) Chewable 1 Tablet(s) Chew at bedtime  chlorhexidine 4% Liquid 1 Application(s) Topical <User Schedule>  dextrose 5%. 1000 milliLiter(s) IV Continuous <Continuous>  dextrose 5%. 1000 milliLiter(s) IV Continuous <Continuous>  fluconAZOLE IVPB 400 milliGRAM(s) IV Intermittent every 24 hours  heparin   Injectable 5000 Unit(s) SubCutaneous every 8 hours  HYDROmorphone  Injectable 0.5 milliGRAM(s) IV Push every 10 minutes PRN  lactated ringers. 1000 milliLiter(s) IV Continuous <Continuous>  lactulose Syrup 10 Gram(s) Oral every 6 hours PRN  melatonin 5 milliGRAM(s) Oral at bedtime  ondansetron Injectable 4 milliGRAM(s) IV Push once PRN  ondansetron Injectable 4 milliGRAM(s) IV Push once PRN      Current Endocrine Meds:   atorvastatin 40 milliGRAM(s) Oral at bedtime  atorvastatin 40 milliGRAM(s) Oral at bedtime  dextrose 40% Gel 15 Gram(s) Oral once  dextrose 40% Gel 15 Gram(s) Oral once  dextrose 50% Injectable 25 Gram(s) IV Push once  dextrose 50% Injectable 12.5 Gram(s) IV Push once  dextrose 50% Injectable 25 Gram(s) IV Push once  dextrose 50% Injectable 25 Gram(s) IV Push once  dextrose 50% Injectable 12.5 Gram(s) IV Push once  dextrose 50% Injectable 25 Gram(s) IV Push once  glucagon  Injectable 1 milliGRAM(s) IntraMuscular once  glucagon  Injectable 1 milliGRAM(s) IntraMuscular once  insulin glargine Injectable (LANTUS) 50 Unit(s) SubCutaneous at bedtime  insulin glargine Injectable (LANTUS) 50 Unit(s) SubCutaneous at bedtime  insulin lispro (ADMELOG) corrective regimen sliding scale   SubCutaneous three times a day before meals  insulin lispro (ADMELOG) corrective regimen sliding scale   SubCutaneous three times a day before meals  insulin lispro Injectable (ADMELOG) 20 Unit(s) SubCutaneous three times a day before meals  insulin lispro Injectable (ADMELOG) 20 Unit(s) SubCutaneous three times a day before meals      Allergies:  No Known Allergies      ROS:  Denies the following except as indicated.    General: weight loss/weight gain, decreased appetite, fatigue, fever  Eyes: blurry vision, double vision  ENT: neck swelling, dysphagia, voice changes   CV: palpitations, SOB, chest pain, cough  GI: nausea, vomiting, diarrhea, constipation, abdominal pain  : nocturia,  polyuria, dysuria  Endo: decreased libido, heat/cold intolerance, jitteriness  MSK: arthralgias, myalgias  Skin: rash, dryness, diaphoresis  Neuro: pedal numbness,pedal paresthesias, pedal pain    Height (cm): 177.8 (04-12 @ 16:30)  Weight (kg): 116.6 (04-12 @ 16:30)  BMI (kg/m2): 36.9 (04-12 @ 16:30)    Vital Signs Last 24 Hrs  T(C): 36.7 (12 Apr 2021 18:23), Max: 36.8 (12 Apr 2021 07:53)  T(F): 98 (12 Apr 2021 18:23), Max: 98.2 (12 Apr 2021 07:53)  HR: 83 (12 Apr 2021 19:00) (65 - 84)  BP: 113/56 (12 Apr 2021 19:00) (90/54 - 123/58)  BP(mean): 72 (12 Apr 2021 18:33) (70 - 75)  RR: 16 (12 Apr 2021 19:00) (14 - 19)  SpO2: 98% (12 Apr 2021 19:00) (95% - 98%)  Constitutional: WN/WD in NAD.   Neck: no thyromegaly or palpable thyroid nodules   Respiratory: lungs CTAB.  Cardiovascular: regular rate and rhythm, normal S1 and S2, no audible murmurs  GI: soft, NT/ND, no masses/HSM appreciated.  Ext: no edema, no ulcers, pedal pulses palpable bilaterally  Neurology: no tremor, monofilament sensation intact in feet  Psychiatric: A&O x 3, normal affect/mood.        LABS:                        12.0   8.47  )-----------( 293      ( 12 Apr 2021 05:23 )             37.1     04-12    142  |  104  |  16  ----------------------------<  117<H>  5.1<H>   |  26  |  1.3    Ca    9.1      12 Apr 2021 05:23  Phos  3.1     04-12  Mg     1.8     04-12    TPro  5.1<L>  /  Alb  2.8<L>  /  TBili  0.5  /  DBili  x   /  AST  70<H>  /  ALT  40  /  AlkPhos  137<H>  04-12                               Thyroid Stimulating Hormone, Serum: 0.30 (04-11 @ 07:30)          RADIOLOGY & ADDITIONAL STUDIES:    A/P:60yMale
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
## 2021-04-12 NOTE — PRE-ANESTHESIA EVALUATION ADULT - MALLAMPATI CLASS
Class II - visualization of the soft palate, fauces, and uvula
Class IV (difficult) - the soft palate is not visible at all
Class IV (difficult) - the soft palate is not visible at all
Class II - visualization of the soft palate, fauces, and uvula

## 2021-04-12 NOTE — PROGRESS NOTE ADULT - ASSESSMENT
type 2 diabetes, and obesity, continue current treatment plan, please call dr puentes at discharge, so discharge meds can be discussed, and planned. in the meantime, needs strict calorie control with 1500 kcal diet, and avoidance of all fruit juices and refined sugars.

## 2021-04-12 NOTE — PROGRESS NOTE ADULT - SUBJECTIVE AND OBJECTIVE BOX
THOMAS HARDIN  60y, Male    All available historical data reviewed    OVERNIGHT EVENTS:    none  ROS:  General: Denies rigors, nightsweats  HEENT: Denies headache, rhinorrhea, sore throat, eye pain  CV: Denies CP, palpitations  PULM: Denies wheezing, hemoptysis  GI: Denies hematemesis, hematochezia, melena  : Denies discharge, hematuria  MSK: Denies arthralgias, myalgias  SKIN: Denies rash, lesions  NEURO: Denies paresthesias, weakness  PSYCH: Denies depression, anxiety    VITALS:  T(F): 97.4, Max: 97.8 (04-12-21 @ 00:00)  HR: 65  BP: 123/58  RR: 18Vital Signs Last 24 Hrs  T(C): 36.3 (12 Apr 2021 07:53), Max: 36.6 (12 Apr 2021 00:00)  T(F): 97.4 (12 Apr 2021 07:53), Max: 97.8 (12 Apr 2021 00:00)  HR: 65 (12 Apr 2021 07:53) (65 - 79)  BP: 123/58 (12 Apr 2021 07:53) (104/58 - 123/58)  BP(mean): --  RR: 18 (12 Apr 2021 07:53) (18 - 19)  SpO2: --    TESTS & MEASUREMENTS:                        12.0   8.47  )-----------( 293      ( 12 Apr 2021 05:23 )             37.1     04-12    142  |  104  |  16  ----------------------------<  117<H>  5.1<H>   |  26  |  1.3    Ca    9.1      12 Apr 2021 05:23  Phos  3.1     04-12  Mg     1.8     04-12    TPro  5.1<L>  /  Alb  2.8<L>  /  TBili  0.5  /  DBili  x   /  AST  70<H>  /  ALT  40  /  AlkPhos  137<H>  04-12    LIVER FUNCTIONS - ( 12 Apr 2021 05:23 )  Alb: 2.8 g/dL / Pro: 5.1 g/dL / ALK PHOS: 137 U/L / ALT: 40 U/L / AST: 70 U/L / GGT: x             Culture - Other (collected 04-10-21 @ 14:00)  Source: .Other pubis wound  Preliminary Report (04-11-21 @ 18:15):    Few Candida albicans "Susceptibilities not performed"    Culture - Surgical Swab (collected 04-09-21 @ 17:30)  Source: .Surgical Swab None  Preliminary Report (04-11-21 @ 11:34):    Few Liss albicans "Susceptibilities not performed"    Culture - Surgical Swab (collected 04-09-21 @ 17:30)  Source: .Surgical Swab None  Preliminary Report (04-11-21 @ 11:35):    Numerous Liss albicans "Susceptibilities not performed"    Culture - Fungal, Tissue (collected 04-08-21 @ 17:14)  Source: .Tissue PUBIS  Preliminary Report (04-09-21 @ 08:48):    Testing in progress    Culture - Tissue with Gram Stain (collected 04-08-21 @ 17:14)  Source: .Tissue PUBIS  Gram Stain (04-09-21 @ 20:52):    Upon re-evaluation of gram stain:    Few polymorphonuclear leukocytes seen per low power field    Numerous Gram Variable Rods seen per oil power field    Rare Yeast like cells seen per oil power field  Preliminary Report (04-09-21 @ 20:36):    Moderate Liss albicans "Susceptibilities not performed"    Moderate Lactobacillus species "Susceptibilities not performed"    Culture - Fungal, Tissue (collected 04-08-21 @ 17:13)  Source: .Tissue PUBIS  Preliminary Report (04-09-21 @ 08:48):    Testing in progress    Culture - Tissue with Gram Stain (collected 04-08-21 @ 17:13)  Source: .Tissue PUBIS  Gram Stain (04-10-21 @ 18:54):    Upon re-evaluation of gram stain:    Numerous polymorphonuclear leukocytes seen per low power field    Numerous Gram Variable Rods seen per oil power field    Rare Gram positive cocci in pairs seen per oil power field    Moderate Yeast like cells seen per oil power field  Preliminary Report (04-10-21 @ 18:55):    Moderate Enterococcus faecalis    Moderate Staphylococcus epidermidis    Moderate Liss albicans "Susceptibilities not performed"    Numerous Lactobacillus species "Susceptibilities not performed"  Organism: Enterococcus faecalis  Staphylococcus epidermidis (04-11-21 @ 18:20)  Organism: Staphylococcus epidermidis (04-11-21 @ 18:20)      -  Ampicillin/Sulbactam: S <=8/4      -  Cefazolin: S <=4      -  Clindamycin: S <=0.25      -  Erythromycin: R >4      -  Gentamicin: S <=1 Should not be used as monotherapy      -  Oxacillin: S <=0.25      -  Penicillin: R 0.25      -  RIF- Rifampin: S <=1 Should not be used as monotherapy      -  Tetra/Doxy: S <=1      -  Trimethoprim/Sulfamethoxazole: S <=0.5/9.5      -  Vancomycin: S 1      Method Type: ISIAH  Organism: Enterococcus faecalis (04-11-21 @ 18:20)      -  Ampicillin: S <=2 Predicts results to ampicillin/sulbactam, amoxacillin-clavulanate and  piperacillin-tazobactam.      -  Tetra/Doxy: R >8      -  Vancomycin: S 2      Method Type: ISIAH    Culture - Abscess with Gram Stain (collected 04-07-21 @ 14:07)  Source: .Abscess suprapubic region  Final Report (04-10-21 @ 19:43):    Culture yields >4 types of aerobic and/or anaerobic bacteria    Call client services within 7 days if further workup is clinically    indicated.    Culture includes    Few Staphylococcus aureus  Organism: Staphylococcus aureus (04-10-21 @ 19:43)  Organism: Staphylococcus aureus (04-10-21 @ 19:43)      -  Ampicillin/Sulbactam: S <=8/4      -  Cefazolin: S <=4      -  Clindamycin: S 0.5      -  Erythromycin: S <=0.25      -  Gentamicin: S <=1 Should not be used as monotherapy      -  Oxacillin: S <=0.25      -  Penicillin: R 8      -  RIF- Rifampin: S <=1 Should not be used as monotherapy      -  Tetra/Doxy: S <=1      -  Trimethoprim/Sulfamethoxazole: S <=0.5/9.5      -  Vancomycin: S 1      Method Type: ISIAH    Culture - Blood (collected 04-07-21 @ 12:08)  Source: .Blood Blood  Preliminary Report (04-08-21 @ 22:01):    No growth to date.    Culture - Blood (collected 04-07-21 @ 12:08)  Source: .Blood Blood  Preliminary Report (04-08-21 @ 22:01):    No growth to date.            RADIOLOGY & ADDITIONAL TESTS:  Personal review of radiological diagnostics performed  Echo and EKG results noted when applicable.     MEDICATIONS:  atorvastatin 40 milliGRAM(s) Oral at bedtime  calcium carbonate    500 mG (Tums) Chewable 1 Tablet(s) Chew at bedtime  cefepime   IVPB 2000 milliGRAM(s) IV Intermittent every 12 hours  chlorhexidine 4% Liquid 1 Application(s) Topical <User Schedule>  clotrimazole 1% Cream 1 Application(s) Topical two times a day  dextrose 40% Gel 15 Gram(s) Oral once  dextrose 5%. 1000 milliLiter(s) IV Continuous <Continuous>  dextrose 5%. 1000 milliLiter(s) IV Continuous <Continuous>  dextrose 50% Injectable 25 Gram(s) IV Push once  dextrose 50% Injectable 12.5 Gram(s) IV Push once  dextrose 50% Injectable 25 Gram(s) IV Push once  fluconAZOLE IVPB 400 milliGRAM(s) IV Intermittent every 24 hours  fluconAZOLE IVPB      glucagon  Injectable 1 milliGRAM(s) IntraMuscular once  heparin   Injectable 5000 Unit(s) SubCutaneous every 8 hours  insulin glargine Injectable (LANTUS) 50 Unit(s) SubCutaneous at bedtime  insulin lispro (ADMELOG) corrective regimen sliding scale   SubCutaneous three times a day before meals  insulin lispro Injectable (ADMELOG) 20 Unit(s) SubCutaneous three times a day before meals  lactulose Syrup 10 Gram(s) Oral every 6 hours PRN  linezolid  IVPB 600 milliGRAM(s) IV Intermittent every 12 hours  melatonin 5 milliGRAM(s) Oral at bedtime  metroNIDAZOLE  IVPB 500 milliGRAM(s) IV Intermittent every 8 hours  ondansetron Injectable 4 milliGRAM(s) IV Push once PRN      ANTIBIOTICS:  cefepime   IVPB 2000 milliGRAM(s) IV Intermittent every 12 hours  fluconAZOLE IVPB 400 milliGRAM(s) IV Intermittent every 24 hours  fluconAZOLE IVPB      linezolid  IVPB 600 milliGRAM(s) IV Intermittent every 12 hours  metroNIDAZOLE  IVPB 500 milliGRAM(s) IV Intermittent every 8 hours

## 2021-04-12 NOTE — BRIEF OPERATIVE NOTE - NSICDXBRIEFPREOP_GEN_ALL_CORE_FT
PRE-OP DIAGNOSIS:  Complicated abscess 08-Apr-2021 15:46:26 pubis Ruben Mckeon  
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
## 2021-04-12 NOTE — BRIEF OPERATIVE NOTE - NSICDXBRIEFPOSTOP_GEN_ALL_CORE_FT
POST-OP DIAGNOSIS:  Complicated abscess 08-Apr-2021 15:48:22 pubis Ruben Mckeon  

## 2021-04-12 NOTE — BRIEF OPERATIVE NOTE - NSICDXBRIEFPROCEDURE_GEN_ALL_CORE_FT
PROCEDURES:  Complex drainage of abscess 08-Apr-2021 15:45:53 pubRuben Chauhan  Selective debridement of wound 12-Apr-2021 19:01:51 debridement and closure pubRuben Chauhan  
PROCEDURES:  Complex drainage of abscess 08-Apr-2021 15:45:53 pubis Ruben Mckeon  
PROCEDURES:  Complex drainage of abscess 08-Apr-2021 15:45:53 pubis Ruben Mckeon

## 2021-04-12 NOTE — PROGRESS NOTE ADULT - SUBJECTIVE AND OBJECTIVE BOX
Patient is a 60y old  Male who presents with a chief complaint of DKA (12 Apr 2021 06:35)    Patient was seen and examined.  Planned for further debridement of left pubic abscess today.  Pt on 1:1 for monitoring for high suicidal risk  Denies any new complaints.  All systems reviewed positive history as mentioned above.    PAST MEDICAL & SURGICAL HISTORY:  Diabetes mellitus  Dyslipidemia  Hypertension    No significant past surgical history      Allergies  No Known Allergies    Home Medications:  atorvastatin 40 mg oral tablet: 1 tab(s) orally once a day (07 Apr 2021 19:05)  gabapentin 600 mg oral tablet: 1 tab(s) orally 3 times a day (07 Apr 2021 19:05)  Jardiance 10 mg oral tablet: 1 tab(s) orally once a day (in the morning) (07 Apr 2021 19:05)  lisinopril 20 mg oral tablet: 1 tab(s) orally once a day (07 Apr 2021 19:05)  pioglitazone 30 mg oral tablet: 1 tab(s) orally once a day (07 Apr 2021 19:05)  Victoza 18 mg/3 mL subcutaneous solution:  (07 Apr 2021 19:05)    MEDICATIONS  (STANDING):  atorvastatin 40 milliGRAM(s) Oral at bedtime  calcium carbonate    500 mG (Tums) Chewable 1 Tablet(s) Chew at bedtime  cefepime   IVPB 2000 milliGRAM(s) IV Intermittent every 12 hours  clotrimazole 1% Cream 1 Application(s) Topical two times a day  fluconAZOLE IVPB 400 milliGRAM(s) IV Intermittent every 24 hours  fluconAZOLE IVPB      glucagon  Injectable 1 milliGRAM(s) IntraMuscular once  heparin   Injectable 5000 Unit(s) SubCutaneous every 8 hours  insulin glargine Injectable (LANTUS) 50 Unit(s) SubCutaneous at bedtime  insulin lispro (ADMELOG) corrective regimen sliding scale   SubCutaneous three times a day before meals  insulin lispro Injectable (ADMELOG) 20 Unit(s) SubCutaneous three times a day before meals  linezolid  IVPB 600 milliGRAM(s) IV Intermittent every 12 hours  melatonin 5 milliGRAM(s) Oral at bedtime  metroNIDAZOLE  IVPB 500 milliGRAM(s) IV Intermittent every 8 hours    MEDICATIONS  (PRN):  lactulose Syrup 10 Gram(s) Oral every 6 hours PRN until BM  ondansetron Injectable 4 milliGRAM(s) IV Push once PRN Nausea and/or Vomiting    Vital Signs Last 24 Hrs  T(C): 36.3  T(F): 97.4  HR: 65  BP: 123/58  BP(mean): --  RR: 18  SpO2: --    O/E:  Awake, alert, not in distress.  HEENT: atraumatic, EOMI.  Chest: clear.  CVS: SIS2 +, no murmur.  P/A: obese, BS+, left groin dressing+  CNS: non focal.  Ext: no edema feet.  Skin: no rash, no ulcers.  All systems reviewed positive findings as above.    POCT Blood Glucose.: 156 mg/dL (12 Apr 2021 07:26)  POCT Blood Glucose.: 191 mg/dL (11 Apr 2021 21:20)  POCT Blood Glucose.: 217 mg/dL (11 Apr 2021 16:18)  POCT Blood Glucose.: 220 mg/dL (11 Apr 2021 11:22)                            12.0<L>  8.47  )-----------( 293      ( 12 Apr 2021 05:23 )             37.1<L>                        11.4<L>  7.62  )-----------( 254      ( 11 Apr 2021 07:30 )             34.5<L>    04-12    142  |  104  |  16  ----------------------------<  117<H>  5.1<H>   |  26  |  1.3  04-11    139  |  106  |  16  ----------------------------<  238<H>  4.0   |  22  |  1.2    Ca    9.1      12 Apr 2021 05:23  Ca    8.3<L>      11 Apr 2021 07:30  Phos  3.1     04-12  Mg     1.8     04-12    TPro  5.1<L>  /  Alb  2.8<L>  /  TBili  0.5  /  DBili  x   /  AST  70<H>  /  ALT  40  /  AlkPhos  137<H>  04-12  TPro  4.8<L>  /  Alb  2.6<L>  /  TBili  0.5  /  DBili  x   /  AST  15  /  ALT  14  /  AlkPhos  106  04-11                Culture - Other (collected 10 Apr 2021 14:00)  Source: .Other pubis wound  Preliminary Report (11 Apr 2021 18:15):    Few Candida albicans "Susceptibilities not performed"    Culture - Surgical Swab (collected 09 Apr 2021 17:30)  Source: .Surgical Swab None  Preliminary Report (11 Apr 2021 11:34):    Few Candida albicans "Susceptibilities not performed"    Culture - Surgical Swab (collected 09 Apr 2021 17:30)  Source: .Surgical Swab None  Preliminary Report (11 Apr 2021 11:35):    Numerous Liss albicans "Susceptibilities not performed"

## 2021-04-13 LAB
ALBUMIN SERPL ELPH-MCNC: 3 G/DL — LOW (ref 3.5–5.2)
ALP SERPL-CCNC: 161 U/L — HIGH (ref 30–115)
ALT FLD-CCNC: 61 U/L — HIGH (ref 0–41)
ANION GAP SERPL CALC-SCNC: 13 MMOL/L — SIGNIFICANT CHANGE UP (ref 7–14)
AST SERPL-CCNC: 62 U/L — HIGH (ref 0–41)
BASOPHILS # BLD AUTO: 0.04 K/UL — SIGNIFICANT CHANGE UP (ref 0–0.2)
BASOPHILS NFR BLD AUTO: 0.4 % — SIGNIFICANT CHANGE UP (ref 0–1)
BILIRUB SERPL-MCNC: 0.5 MG/DL — SIGNIFICANT CHANGE UP (ref 0.2–1.2)
BUN SERPL-MCNC: 24 MG/DL — HIGH (ref 10–20)
CALCIUM SERPL-MCNC: 8.7 MG/DL — SIGNIFICANT CHANGE UP (ref 8.5–10.1)
CHLORIDE SERPL-SCNC: 99 MMOL/L — SIGNIFICANT CHANGE UP (ref 98–110)
CO2 SERPL-SCNC: 24 MMOL/L — SIGNIFICANT CHANGE UP (ref 17–32)
CREAT SERPL-MCNC: 1.5 MG/DL — SIGNIFICANT CHANGE UP (ref 0.7–1.5)
CULTURE RESULTS: SIGNIFICANT CHANGE UP
CULTURE RESULTS: SIGNIFICANT CHANGE UP
EOSINOPHIL # BLD AUTO: 0.09 K/UL — SIGNIFICANT CHANGE UP (ref 0–0.7)
EOSINOPHIL NFR BLD AUTO: 0.8 % — SIGNIFICANT CHANGE UP (ref 0–8)
GLUCOSE BLDC GLUCOMTR-MCNC: 245 MG/DL — HIGH (ref 70–99)
GLUCOSE BLDC GLUCOMTR-MCNC: 296 MG/DL — HIGH (ref 70–99)
GLUCOSE BLDC GLUCOMTR-MCNC: 321 MG/DL — HIGH (ref 70–99)
GLUCOSE SERPL-MCNC: 312 MG/DL — HIGH (ref 70–99)
HCT VFR BLD CALC: 37.9 % — LOW (ref 42–52)
HGB BLD-MCNC: 12.4 G/DL — LOW (ref 14–18)
IMM GRANULOCYTES NFR BLD AUTO: 1.2 % — HIGH (ref 0.1–0.3)
LYMPHOCYTES # BLD AUTO: 0.83 K/UL — LOW (ref 1.2–3.4)
LYMPHOCYTES # BLD AUTO: 7.4 % — LOW (ref 20.5–51.1)
MAGNESIUM SERPL-MCNC: 1.6 MG/DL — LOW (ref 1.8–2.4)
MCHC RBC-ENTMCNC: 27.8 PG — SIGNIFICANT CHANGE UP (ref 27–31)
MCHC RBC-ENTMCNC: 32.7 G/DL — SIGNIFICANT CHANGE UP (ref 32–37)
MCV RBC AUTO: 85 FL — SIGNIFICANT CHANGE UP (ref 80–94)
MONOCYTES # BLD AUTO: 1.25 K/UL — HIGH (ref 0.1–0.6)
MONOCYTES NFR BLD AUTO: 11.1 % — HIGH (ref 1.7–9.3)
NEUTROPHILS # BLD AUTO: 8.94 K/UL — HIGH (ref 1.4–6.5)
NEUTROPHILS NFR BLD AUTO: 79.1 % — HIGH (ref 42.2–75.2)
NIGHT BLUE STAIN TISS: SIGNIFICANT CHANGE UP
NRBC # BLD: 0 /100 WBCS — SIGNIFICANT CHANGE UP (ref 0–0)
ORGANISM # SPEC MICROSCOPIC CNT: SIGNIFICANT CHANGE UP
PLATELET # BLD AUTO: 300 K/UL — SIGNIFICANT CHANGE UP (ref 130–400)
POTASSIUM SERPL-MCNC: 4.3 MMOL/L — SIGNIFICANT CHANGE UP (ref 3.5–5)
POTASSIUM SERPL-SCNC: 4.3 MMOL/L — SIGNIFICANT CHANGE UP (ref 3.5–5)
PROT SERPL-MCNC: 5.5 G/DL — LOW (ref 6–8)
RBC # BLD: 4.46 M/UL — LOW (ref 4.7–6.1)
RBC # FLD: 13 % — SIGNIFICANT CHANGE UP (ref 11.5–14.5)
SODIUM SERPL-SCNC: 136 MMOL/L — SIGNIFICANT CHANGE UP (ref 135–146)
SPECIMEN SOURCE: SIGNIFICANT CHANGE UP
SURGICAL PATHOLOGY STUDY: SIGNIFICANT CHANGE UP
WBC # BLD: 11.28 K/UL — HIGH (ref 4.8–10.8)
WBC # FLD AUTO: 11.28 K/UL — HIGH (ref 4.8–10.8)

## 2021-04-13 PROCEDURE — 99231 SBSQ HOSP IP/OBS SF/LOW 25: CPT

## 2021-04-13 PROCEDURE — 99233 SBSQ HOSP IP/OBS HIGH 50: CPT | Mod: GC

## 2021-04-13 PROCEDURE — 99233 SBSQ HOSP IP/OBS HIGH 50: CPT

## 2021-04-13 RX ORDER — INSULIN LISPRO 100/ML
25 VIAL (ML) SUBCUTANEOUS ONCE
Refills: 0 | Status: DISCONTINUED | OUTPATIENT
Start: 2021-04-13 | End: 2021-04-14

## 2021-04-13 RX ORDER — MORPHINE SULFATE 50 MG/1
2 CAPSULE, EXTENDED RELEASE ORAL ONCE
Refills: 0 | Status: DISCONTINUED | OUTPATIENT
Start: 2021-04-13 | End: 2021-04-13

## 2021-04-13 RX ORDER — MAGNESIUM SULFATE 500 MG/ML
2 VIAL (ML) INJECTION ONCE
Refills: 0 | Status: COMPLETED | OUTPATIENT
Start: 2021-04-13 | End: 2021-04-13

## 2021-04-13 RX ORDER — INSULIN GLARGINE 100 [IU]/ML
55 INJECTION, SOLUTION SUBCUTANEOUS AT BEDTIME
Refills: 0 | Status: DISCONTINUED | OUTPATIENT
Start: 2021-04-13 | End: 2021-04-14

## 2021-04-13 RX ADMIN — AMPICILLIN SODIUM AND SULBACTAM SODIUM 200 GRAM(S): 250; 125 INJECTION, POWDER, FOR SUSPENSION INTRAMUSCULAR; INTRAVENOUS at 05:33

## 2021-04-13 RX ADMIN — Medication 4: at 12:06

## 2021-04-13 RX ADMIN — INSULIN GLARGINE 55 UNIT(S): 100 INJECTION, SOLUTION SUBCUTANEOUS at 21:13

## 2021-04-13 RX ADMIN — FLUCONAZOLE 100 MILLIGRAM(S): 150 TABLET ORAL at 00:19

## 2021-04-13 RX ADMIN — AMPICILLIN SODIUM AND SULBACTAM SODIUM 200 GRAM(S): 250; 125 INJECTION, POWDER, FOR SUSPENSION INTRAMUSCULAR; INTRAVENOUS at 00:19

## 2021-04-13 RX ADMIN — AMPICILLIN SODIUM AND SULBACTAM SODIUM 200 GRAM(S): 250; 125 INJECTION, POWDER, FOR SUSPENSION INTRAMUSCULAR; INTRAVENOUS at 17:39

## 2021-04-13 RX ADMIN — Medication 50 GRAM(S): at 15:47

## 2021-04-13 RX ADMIN — AMPICILLIN SODIUM AND SULBACTAM SODIUM 200 GRAM(S): 250; 125 INJECTION, POWDER, FOR SUSPENSION INTRAMUSCULAR; INTRAVENOUS at 23:05

## 2021-04-13 RX ADMIN — Medication 5 MILLIGRAM(S): at 21:12

## 2021-04-13 RX ADMIN — Medication 1 TABLET(S): at 21:11

## 2021-04-13 RX ADMIN — MORPHINE SULFATE 2 MILLIGRAM(S): 50 CAPSULE, EXTENDED RELEASE ORAL at 08:13

## 2021-04-13 RX ADMIN — ATORVASTATIN CALCIUM 40 MILLIGRAM(S): 80 TABLET, FILM COATED ORAL at 21:11

## 2021-04-13 RX ADMIN — Medication 20 UNIT(S): at 08:16

## 2021-04-13 RX ADMIN — HEPARIN SODIUM 5000 UNIT(S): 5000 INJECTION INTRAVENOUS; SUBCUTANEOUS at 05:34

## 2021-04-13 RX ADMIN — Medication 20 UNIT(S): at 12:07

## 2021-04-13 RX ADMIN — AMPICILLIN SODIUM AND SULBACTAM SODIUM 200 GRAM(S): 250; 125 INJECTION, POWDER, FOR SUSPENSION INTRAMUSCULAR; INTRAVENOUS at 12:07

## 2021-04-13 RX ADMIN — Medication 6: at 08:16

## 2021-04-13 RX ADMIN — HEPARIN SODIUM 5000 UNIT(S): 5000 INJECTION INTRAVENOUS; SUBCUTANEOUS at 14:30

## 2021-04-13 RX ADMIN — HEPARIN SODIUM 5000 UNIT(S): 5000 INJECTION INTRAVENOUS; SUBCUTANEOUS at 21:12

## 2021-04-13 NOTE — PROGRESS NOTE ADULT - SUBJECTIVE AND OBJECTIVE BOX
Patient is a 60y old  pmhx of  uncontrolled DM2, diabetic retinopathy and neuropathy, HTN, morbid obesity, CKD 3, admitted with a chief complaint of DKA, found to have pelvic abscess  (13 Apr 2021 10:38)  POD #4 s/p debridement of pubis     AM rounds : ODELL     PAST MEDICAL & SURGICAL HISTORY:  Diabetes mellitus    Dyslipidemia    Hypertension    No significant past surgical history        SOCIAL HX:   Smoking                         ETOH                            Other    FAMILY HISTORY:      PHYSICAL EXAM    ICU Vital Signs Last 24 Hrs  T(C): 36.2 (13 Apr 2021 08:00), Max: 36.8 (12 Apr 2021 16:19)  T(F): 97.2 (13 Apr 2021 08:00), Max: 98.2 (12 Apr 2021 16:19)  HR: 83 (13 Apr 2021 08:00) (67 - 84)  BP: 106/57 (13 Apr 2021 08:00) (90/54 - 121/70)  BP(mean): 72 (12 Apr 2021 18:33) (70 - 75)  ABP: --  ABP(mean): --  RR: 18 (13 Apr 2021 08:00) (14 - 18)  SpO2: 98% (12 Apr 2021 19:00) (95% - 98%)    PHYSICAL EXAM:  GENERAL: NAD,   HEAD:  Atraumatic, Normocephalic  CHEST/LUNG: breathing comfortably on room air, speaking in full sentences w/o difficulty.   HEART: In no acute cardiopulmonary distress.   SKIN: Pubis: wound, s/p debridement, with staples in place, incision is clean and dry , no purulence noted. no malodor. no active bleeding   FELIPE drain in place  ~ 10cc of sersanguinous fluid    Patient is a 60y old  pmhx of  uncontrolled DM2, diabetic retinopathy and neuropathy, HTN, morbid obesity, CKD 3, admitted with a chief complaint of DKA, found to have pelvic abscess . POD #4 s/p debridement of pubis     AM rounds : NAEO , afebrile, no significant pain .    PAST MEDICAL & SURGICAL HISTORY:  Diabetes mellitus    Dyslipidemia    Hypertension    No significant past surgical history        SOCIAL HX:   Smoking                         ETOH                            Other    FAMILY HISTORY:      PHYSICAL EXAM    ICU Vital Signs Last 24 Hrs  T(C): 36.2 (13 Apr 2021 08:00), Max: 36.8 (12 Apr 2021 16:19)  T(F): 97.2 (13 Apr 2021 08:00), Max: 98.2 (12 Apr 2021 16:19)  HR: 83 (13 Apr 2021 08:00) (67 - 84)  BP: 106/57 (13 Apr 2021 08:00) (90/54 - 121/70)  BP(mean): 72 (12 Apr 2021 18:33) (70 - 75)  ABP: --  ABP(mean): --  RR: 18 (13 Apr 2021 08:00) (14 - 18)  SpO2: 98% (12 Apr 2021 19:00) (95% - 98%)    PHYSICAL EXAM:  GENERAL: NAD,   HEAD:  Atraumatic, Normocephalic  CHEST/LUNG: breathing comfortably on room air, speaking in full sentences w/o difficulty.   HEART: In no acute cardiopulmonary distress.   SKIN: Pubis: wound, s/p debridement, with staples in place, incision is clean and dry , no purulence noted. no malodor. no active bleeding   FELIPE drain in place  ~ 10cc of sersanguinous fluid

## 2021-04-13 NOTE — PROGRESS NOTE BEHAVIORAL HEALTH - CASE SUMMARY
Mr De La Cruz is a 60 year old man with no history of a psychiatric illness who was admitted to the medical floor following a fall, found to be in Diabetic Keto acidosis and also for the management of sepsis and groin abscess. Psychiatry consult was called for suicidal ideations as patient reportedly consumed a lot of sugar with the plan to become comatose and die.   Patient appears to have multiple social stressors however, it appears that he has been depressed for many years, isolating himself, cut of ties with family with feelings of hopelessness and helplessness. It appears that patient had social intentions prior to his hospital. Although patient currently denies having suicidal ideations, he seems that he is minimizing his symptoms. He seem to be able to take care of himself and has compilations of diabetes as a result of his depression.   At this time, patient is considered a danger to himself and others and would benefit from inpatient psychiatric hospitalization upon medical clearance and bed availability. There is no acute indication for starting any psychotropic medication. We recommend continuation of the constant observation and also recommend Atarax 50mg P.O Q 6 hours PRN for anxiety and insomnia.
Mr De La Cruz is a 60 year old man with no history of a psychiatric illness who was admitted to the medical floor following a fall, found to be in Diabetic Keto acidosis and also for the management of sepsis and groin abscess. Psychiatry consult was called for suicidal ideations as patient reportedly consumed a lot of sugar with the plan to become comatose and die. Patient is status post pelvic debridement, being followed up by the Burn team. Although patient reports that he feels better, is behavior prior to his presentation to the hospital , indicate ongoing depression with inability to take care of himself and inability to function . He denies current suicidal ideations , intent or plans.   	   At this time, patient is considered a danger to himself and others and would benefit from inpatient psychiatric hospitalization upon medical clearance and bed availability. There is no acute indication for starting any psychotropic medication. We recommend continuation of the constant observation and also recommend Atarax 50mg P.O Q 6 hours PRN for anxiety and insomnia.

## 2021-04-13 NOTE — PROGRESS NOTE ADULT - ASSESSMENT
60 years old male known to have uncontrolled DM2, diabetic retinopathy and neuropathy, HTN, morbid obesity, CKD 3, who called EMS after falling at home  and was unable to get up.       # Sepsis POA resolved  # Left pubic abscess  -  CT Abdomen and Pelvis w/ IV Cont (04.07.21 @ 16:05) >Edema/inflammatory changes in the soft tissues of the anterior pelvis involving the left groin and pubic region. Small 2.1 cm fluid pocket along the skin surface. No soft tissue gas identified.  - blood Culture Results: No growth to date. (04.07.21 @ 12:08)  - wound Culture Results: Few Candida albicans "Susceptibilities not performed" (04.10.21 @ 14:00)  -  s/p debridement on 4/8, 410, 4/13  - c/w  unasyn  - c/w fluconazole    # Depression with active suicidal ideation   - evaluated by psychiatry: depression with active suicidal ideation. This is in the context of social stressors. At this time, patient is considered a danger to himself and others and would benefit from inpatient psychiatric hospitalization upon medical clearance and bed availability. There is no acute indication for starting any psychotropic medication  - c/w 1:1 monitoring for high suicide risk  - Psych F/u    # DKA-resolved  # DM type2  - A1C with Estimated Average Glucose Result: >15.5: % (04.08.21 @ 04:53)  - evaluated by endocrine  - increase lantus, lispro    # Acute kidney injury in CKD stage 2, prerenal  -monitor BUN/creat  - hold lisinopril    # Hypomagnesemia  - replete Mg    # Hypertension- stable  - holding lisinopril    # Dyslipidemia  - c/w statin    # Obesity  - BMI: 36    # DVT prophylaxis    # Full code    # Pending: Burn F/u  # Discussed plan of care with patient  # Disposition: inpatient psychiatry   60 years old male known to have uncontrolled DM2, diabetic retinopathy and neuropathy, HTN, morbid obesity, CKD 3, who called EMS after falling at home  and was unable to get up.       # Sepsis POA resolved  # Left pubic abscess  -  CT Abdomen and Pelvis w/ IV Cont (04.07.21 @ 16:05) >Edema/inflammatory changes in the soft tissues of the anterior pelvis involving the left groin and pubic region. Small 2.1 cm fluid pocket along the skin surface. No soft tissue gas identified.  - blood Culture Results: No growth to date. (04.07.21 @ 12:08)  - wound Culture Results: Few Candida albicans "Susceptibilities not performed" (04.10.21 @ 14:00)  -  s/p debridement on 4/8, 410, 4/13  - c/w  unasyn      # Depression with active suicidal ideation   - evaluated by psychiatry: depression with active suicidal ideation. This is in the context of social stressors. At this time, patient is considered a danger to himself and others and would benefit from inpatient psychiatric hospitalization upon medical clearance and bed availability. There is no acute indication for starting any psychotropic medication  - c/w 1:1 monitoring for high suicide risk  - Psych F/u    # DKA-resolved  # DM type2  - A1C with Estimated Average Glucose Result: >15.5: % (04.08.21 @ 04:53)  - evaluated by endocrine  - increase lantus, lispro    # Acute kidney injury in CKD stage 2, prerenal  -monitor BUN/creat  - hold lisinopril    # Hypomagnesemia  - replete Mg    # Hypertension- stable  - holding lisinopril    # Dyslipidemia  - c/w statin    # Obesity  - BMI: 36    # DVT prophylaxis    # Full code    # Pending: Burn F/u  # Discussed plan of care with patient  # Disposition: inpatient psychiatry

## 2021-04-13 NOTE — PROGRESS NOTE ADULT - ASSESSMENT
60 year old male with uncontrolled DM2, diabetic retinopathy, and neuropathy, HTN, morbid obesity,     # Necrotizing pubic abscess, sepsis present on admission  - May have contributed in being a trigger for DKA.   - Burn taking the patient for surgery 4/12. s/p debridement on 4/8 and 410. Continue NPO for procedure.   - Blood cultures show NGT   - ID evaluated the patient, continue with Zyvox 600mg IV q12h, Cefepime 2g IV q12h, Flagyl 500mg q8h.   - Surgical abscess cultures show candida albicans, on fluconazole   - Follow up with ID    # Depression with active suicidal ideation   - As per patient's sister, patient was depressed and not taking care of himself. Evaluated by psych and placed on a 1:1 sit.   - Follow up with psych     # Metabolic acidosis secondary to DKA   - PH 7.17 > 7.08, HCO3 8 > 5, B-OH>9.0   - HbA1c >15.5% possible medication noncompliance. follows with Dr. Vick OP   - trop negative x2  - s/p insulin gtt. Now on subq insulin 50/20/20/20 and tolerating diet   - Follow up lytes and replete    # pseudo hyponatremia on admission - resolved   - Na corrected for Glucose was 141 on admiossion likely secondary to hyperglycemia    # HTN - BP low, holding home meds for now    # VALDEZ on CKD - improved fluids, likely prerenal    # Obesity    DVT PPx: heparin sq  GI ppx: not indicated  Diet: npo for sx   Activity: IAT   61 yo M PMHx uncontrolled DM2, diabetic retinopathy, and neuropathy, HTN, morbid obesity presented to ED after fall. Admitted with diagnosis of DKA 2/2 sepsis 2/2 necrotizing pubic abscess    # Sepsis 2/2 necrotizing pubic abscess  - Sepsis resolved  - s/p 3 debridements on 4/8, 4/10 and 4/12, has drain in  - Bcx 4/7 show NGT   - Abscess Cx grew S.aureus, enterococcus feacalis and candida  - s/p zyvox, cefepime, flagyl and diflucan  - c/w unasyn 3 gram q6 IV   - will d/c on PO augmentin 875 mg for 10 days  - outpatient burn follow up, no further debridements this admission  - ID and burn following    # Depression with active suicidal ideation   - As per patient's sister, patient was depressed and not taking care of himself.  - Evaluated by psych and placed on a 1:1 sit.   - As per psych pt is deemed dangerous to himself and others, will need hospitalizations once medically cleared    # Metabolic acidosis secondary to DKA   - on admission PH 7.17 > 7.08, HCO3 8 > 5, B-OH>9.0   - HbA1c >15.5% possible medication noncompliance.  - follows with Dr. Vick OP   - s/p insulin gtt.   - c/w insulin 50/20/20/20 and tolerating diet     # Pseudo hyponatremia on admission  - resolved   - Na corrected for Glucose was 141 on admission likely secondary to hyperglycemia    # HTN   - BP borderline low,   - holding home meds for now    # VALDEZ on CKD  - Resolved  - Cr. 2.8 on admission. Baseline 1.6  - improved fluids, likely prerenal    # Obesity    # Misc  DVT ppx: heparin   GI ppx: not indicated  Diet: Carb consistent  Activity: IAT  Code status: Full  Dispo: inpatient psych

## 2021-04-13 NOTE — PROGRESS NOTE ADULT - SUBJECTIVE AND OBJECTIVE BOX
SUBJECTIVE:    Patient is a 60y old Male who presents with a chief complaint of DKA (12 Apr 2021 19:14)    Currently admitted to medicine with the primary diagnosis of Diabetic ketoacidosis       Today is hospital day 6d. This morning he is resting comfortably in bed and reports no new issues or overnight events.     PAST MEDICAL & SURGICAL HISTORY  Diabetes mellitus    Dyslipidemia    Hypertension    No significant past surgical history    ALLERGIES:  No Known Allergies    MEDICATIONS:  STANDING MEDICATIONS  ampicillin/sulbactam  IVPB 3 Gram(s) IV Intermittent every 6 hours  atorvastatin 40 milliGRAM(s) Oral at bedtime  calcium carbonate    500 mG (Tums) Chewable 1 Tablet(s) Chew at bedtime  chlorhexidine 4% Liquid 1 Application(s) Topical <User Schedule>  dextrose 40% Gel 15 Gram(s) Oral once  dextrose 40% Gel 15 Gram(s) Oral once  dextrose 5%. 1000 milliLiter(s) IV Continuous <Continuous>  dextrose 5%. 1000 milliLiter(s) IV Continuous <Continuous>  dextrose 50% Injectable 25 Gram(s) IV Push once  dextrose 50% Injectable 12.5 Gram(s) IV Push once  dextrose 50% Injectable 25 Gram(s) IV Push once  dextrose 50% Injectable 25 Gram(s) IV Push once  dextrose 50% Injectable 12.5 Gram(s) IV Push once  dextrose 50% Injectable 25 Gram(s) IV Push once  fluconAZOLE IVPB 400 milliGRAM(s) IV Intermittent every 24 hours  glucagon  Injectable 1 milliGRAM(s) IntraMuscular once  glucagon  Injectable 1 milliGRAM(s) IntraMuscular once  heparin   Injectable 5000 Unit(s) SubCutaneous every 8 hours  insulin glargine Injectable (LANTUS) 50 Unit(s) SubCutaneous at bedtime  insulin lispro (ADMELOG) corrective regimen sliding scale   SubCutaneous three times a day before meals  insulin lispro (ADMELOG) corrective regimen sliding scale   SubCutaneous three times a day before meals  insulin lispro Injectable (ADMELOG) 20 Unit(s) SubCutaneous three times a day before meals  melatonin 5 milliGRAM(s) Oral at bedtime    PRN MEDICATIONS  lactulose Syrup 10 Gram(s) Oral every 6 hours PRN  ondansetron Injectable 4 milliGRAM(s) IV Push once PRN  ondansetron Injectable 4 milliGRAM(s) IV Push once PRN    VITALS:   T(F): 98  HR: 70  BP: 120/59  RR: 18  SpO2: 98%    LABS:                        12.0   8.47  )-----------( 293      ( 12 Apr 2021 05:23 )             37.1     04-12    142  |  104  |  16  ----------------------------<  117<H>  5.1<H>   |  26  |  1.3    Ca    9.1      12 Apr 2021 05:23  Phos  3.1     04-12  Mg     1.8     04-12    TPro  5.1<L>  /  Alb  2.8<L>  /  TBili  0.5  /  DBili  x   /  AST  70<H>  /  ALT  40  /  AlkPhos  137<H>  04-12              Culture - Other (collected 10 Apr 2021 14:00)  Source: .Other pubis wound  Final Report (12 Apr 2021 12:48):    Few Candida albicans "Susceptibilities not performed"          RADIOLOGY:    PHYSICAL EXAM:  GEN: No acute distress  LUNGS: Clear to auscultation bilaterally   HEART: Regular  ABD: Soft, non-tender, non-distended.  EXT: NC/NC/NE/2+PP/TORREZ/Skin Intact.   NEURO: AAOX3    Intravenous access:   NG tube:   Latif Catheter:   Indwelling Urethral Catheter:     Connect To:  Straight Drainage/[place]    Indication:  Urinary Retention / Obstruction (04-12-21 @ 08:21) (not performed) [Active]       SUBJECTIVE:    Patient is a 60y old Male who presents with a chief complaint of DKA (12 Apr 2021 19:14)    Currently admitted to medicine with the primary diagnosis of Diabetic ketoacidosis       Today is hospital day 6d. This morning he is resting comfortably in bed and reports no new issues or overnight events.     PAST MEDICAL & SURGICAL HISTORY  Diabetes mellitus    Dyslipidemia    Hypertension    No significant past surgical history    ALLERGIES:  No Known Allergies    MEDICATIONS:  STANDING MEDICATIONS  ampicillin/sulbactam  IVPB 3 Gram(s) IV Intermittent every 6 hours  atorvastatin 40 milliGRAM(s) Oral at bedtime  calcium carbonate    500 mG (Tums) Chewable 1 Tablet(s) Chew at bedtime  chlorhexidine 4% Liquid 1 Application(s) Topical <User Schedule>  dextrose 40% Gel 15 Gram(s) Oral once  dextrose 40% Gel 15 Gram(s) Oral once  dextrose 5%. 1000 milliLiter(s) IV Continuous <Continuous>  dextrose 5%. 1000 milliLiter(s) IV Continuous <Continuous>  dextrose 50% Injectable 25 Gram(s) IV Push once  dextrose 50% Injectable 12.5 Gram(s) IV Push once  dextrose 50% Injectable 25 Gram(s) IV Push once  dextrose 50% Injectable 25 Gram(s) IV Push once  dextrose 50% Injectable 12.5 Gram(s) IV Push once  dextrose 50% Injectable 25 Gram(s) IV Push once  fluconAZOLE IVPB 400 milliGRAM(s) IV Intermittent every 24 hours  glucagon  Injectable 1 milliGRAM(s) IntraMuscular once  glucagon  Injectable 1 milliGRAM(s) IntraMuscular once  heparin   Injectable 5000 Unit(s) SubCutaneous every 8 hours  insulin glargine Injectable (LANTUS) 50 Unit(s) SubCutaneous at bedtime  insulin lispro (ADMELOG) corrective regimen sliding scale   SubCutaneous three times a day before meals  insulin lispro (ADMELOG) corrective regimen sliding scale   SubCutaneous three times a day before meals  insulin lispro Injectable (ADMELOG) 20 Unit(s) SubCutaneous three times a day before meals  melatonin 5 milliGRAM(s) Oral at bedtime    PRN MEDICATIONS  lactulose Syrup 10 Gram(s) Oral every 6 hours PRN  ondansetron Injectable 4 milliGRAM(s) IV Push once PRN  ondansetron Injectable 4 milliGRAM(s) IV Push once PRN    VITALS:   T(F): 98  HR: 70  BP: 120/59  RR: 18  SpO2: 98%    LABS:                        12.0   8.47  )-----------( 293      ( 12 Apr 2021 05:23 )             37.1     04-12    142  |  104  |  16  ----------------------------<  117<H>  5.1<H>   |  26  |  1.3    Ca    9.1      12 Apr 2021 05:23  Phos  3.1     04-12  Mg     1.8     04-12    TPro  5.1<L>  /  Alb  2.8<L>  /  TBili  0.5  /  DBili  x   /  AST  70<H>  /  ALT  40  /  AlkPhos  137<H>  04-12              Culture - Other (collected 10 Apr 2021 14:00)  Source: .Other pubis wound  Final Report (12 Apr 2021 12:48):    Few Candida albicans "Susceptibilities not performed"          RADIOLOGY:  < from: CT Abdomen and Pelvis w/ IV Cont (04.07.21 @ 16:05) >  Edema/inflammatory changes in the soft tissues of the anterior pelvis involving the left groin and pubic region. Small 2.1 cm fluid pocket along the skin surface. No soft tissue gas identified.    < end of copied text >    < from: Xray Chest 1 View- PORTABLE-Urgent (Xray Chest 1 View- PORTABLE-Urgent .) (04.07.21 @ 19:10) >  No radiographic evidence of acute cardiopulmonary disease.    < end of copied text >      PHYSICAL EXAM:  GEN: No acute distress  LUNGS: Clear to auscultation bilaterally   HEART: Regular  ABD: Soft, non-tender, non-distended.  EXT: No edema  NEURO: AAOX3      Indwelling Urethral Catheter:     Connect To:  Straight Drainage/[place]    Indication:  Urinary Retention / Obstruction (04-12-21 @ 08:21) (not performed) [Active]

## 2021-04-13 NOTE — PROGRESS NOTE ADULT - ASSESSMENT
OR 4/12- debridement   OR 04/09 - debridement  OR 04/08 - debridement     - large dressing change today  -staples, FELIPE drain  still in place, will continue to follow   -Continue wound care/ dressing changes -WTD kerlix, cover with ABD pad/tape twice a day.   - Wound cx results- 04/08; 04/09; 04/10 - Candida Albicans  -Unasyn 3 gm iv q6h - per ID can change to PO abx now that wound is closed  - Medical management per Primary team   -Will continue to follow    OR 4/12- debridement   OR 04/09 - debridement  OR 04/08 - debridement     - large dressing change today  -staples, FELIPE drain  still in place, will continue to follow   -Continue wound care/ dressing changes -dry ABD over incision site, secure with tape  - Wound cx results- 04/08; 04/09; 04/10 - Candida Albicans  -Unasyn 3 gm iv q6h - per ID can change to PO abx now that wound is closed  - Medical management per Primary team   -Will continue to follow

## 2021-04-13 NOTE — PROGRESS NOTE BEHAVIORAL HEALTH - RISK ASSESSMENT
Risk factors: questionable suicide attempt, current passive suicidal ideation, acute on chronic medical conditions, poor support, current depressed mood.  Protective factors: will to live, insight in need for help, supportive friend.

## 2021-04-13 NOTE — PROGRESS NOTE BEHAVIORAL HEALTH - NSBHFUPINTERVALHXFT_PSY_A_CORE
Chart reviewed, overnight there were no events or PRNs given. Per chart review pt is POD #3 s/p debridement of pubis, and POD #1 s/p second debridement and closure. Staff report the pt has been calm and cooperative. Staff do not report any suicidal comments overnight. The pt states "each day is better" described as being thankful he is no longer dealing with an infection and feeling hopeful about physical therapy. He reports sleeping poorly last night due to his "apnea" and states he has never used a sleep apnea machine. The pt states he is no longer suicidal saying "I was in [place]" about his finances and homelessness. He reports that he does not know what he would do if he felt suicidal again, going on to say "I just need (physical) therapy and housing". The pt reports that he still feels "down" because his children wont talk to him, he cant see his grandchildren, "loneliness", he's worrying about his finances, and feels hopeless about finding affordable housing. He reports last speaking to his sister and brother in law yesterday and is happy to be back in contact with them.     Collateral obtained from the pt's BRAN (elida) and sister (Emily), [phone]. They report they have not spoken to the pt for 5 years prior to this admission. They report this is due to the pt's own self-isolation after the pt's wife left him. They report there is no known psychiatric history in the family. They report the pt has no past psychiatric history, and no past suicide attempts. they report the pt does not like to drink alcohol at baseline.

## 2021-04-13 NOTE — PHYSICAL THERAPY INITIAL EVALUATION ADULT - GENERAL OBSERVATIONS, REHAB EVAL
Chart reviewed, spoke with  re:OOB orders, as per MD PT to HOLD IE today 2* pt going to OR later today, PT to attempt IE tomorrow as patrick, MD aware to update Activity orders when safe.
13:15-13:45 pt encountered ambulating to bathroom with assistance of PCA. Pt on 1:1 obs.

## 2021-04-13 NOTE — ANESTHESIA FOLLOW-UP NOTE - NSEVALATION_GEN_ALL_CORE
No apparent complications or complaints regarding anesthesia care at this time/All questions were answered
No apparent complications or complaints regarding anesthesia care at this time
No apparent complications or complaints regarding anesthesia care at this time/All questions were answered

## 2021-04-13 NOTE — PROGRESS NOTE ADULT - SUBJECTIVE AND OBJECTIVE BOX
Patient is a 60y old  Male who presents with a chief complaint of DKA (12 Apr 2021 06:35)    Patient was seen and examined.  S/p  debridement of left pubic abscess  on 4/12/21  Pt on 1:1 for monitoring for high suicidal risk  Denies any new complaints.  All systems reviewed positive history as mentioned above.    PAST MEDICAL & SURGICAL HISTORY:  Diabetes mellitus  Dyslipidemia  Hypertension    No significant past surgical history      Allergies  No Known Allergies    Home Medications:  atorvastatin 40 mg oral tablet: 1 tab(s) orally once a day (07 Apr 2021 19:05)  gabapentin 600 mg oral tablet: 1 tab(s) orally 3 times a day (07 Apr 2021 19:05)  Jardiance 10 mg oral tablet: 1 tab(s) orally once a day (in the morning) (07 Apr 2021 19:05)  lisinopril 20 mg oral tablet: 1 tab(s) orally once a day (07 Apr 2021 19:05)  pioglitazone 30 mg oral tablet: 1 tab(s) orally once a day (07 Apr 2021 19:05)  Victoza 18 mg/3 mL subcutaneous solution:  (07 Apr 2021 19:05)    MEDICATIONS  (STANDING):  ampicillin/sulbactam  IVPB 3 Gram(s) IV Intermittent every 6 hours  atorvastatin 40 milliGRAM(s) Oral at bedtime  calcium carbonate    500 mG (Tums) Chewable 1 Tablet(s) Chew at bedtime  fluconAZOLE IVPB 400 milliGRAM(s) IV Intermittent every 24 hours  heparin   Injectable 5000 Unit(s) SubCutaneous every 8 hours  insulin glargine Injectable (LANTUS) 50 Unit(s) SubCutaneous at bedtime  insulin lispro (ADMELOG) corrective regimen sliding scale   SubCutaneous three times a day before meals  insulin lispro (ADMELOG) corrective regimen sliding scale   SubCutaneous three times a day before meals  insulin lispro Injectable (ADMELOG) 20 Unit(s) SubCutaneous three times a day before meals  melatonin 5 milliGRAM(s) Oral at bedtime    MEDICATIONS  (PRN):  lactulose Syrup 10 Gram(s) Oral every 6 hours PRN until BM  ondansetron Injectable 4 milliGRAM(s) IV Push once PRN Nausea and/or Vomiting  ondansetron Injectable 4 milliGRAM(s) IV Push once PRN Nausea and/or Vomiting    O/E:  Awake, alert, not in distress.  HEENT: atraumatic, EOMI.  Chest: clear.  CVS: SIS2 +, no murmur.  P/A: obese, BS+, left groin dressing+  CNS: non focal.  Ext: no edema feet.  Skin: no rash, no ulcers.  All systems reviewed positive findings as above.                          12.4<L>  11.28<H> )-----------( 300      ( 13 Apr 2021 07:38 )             37.9<L>                        12.0<L>  8.47  )-----------( 293      ( 12 Apr 2021 05:23 )             37.1<L>  04-13    136  |  99  |  24<H>  ----------------------------<  312<H>  4.3   |  24  |  1.5    Ca    8.7      13 Apr 2021 07:38  Phos  3.1     04-12  Mg     1.6     04-13    TPro  5.5<L>  /  Alb  3.0<L>  /  TBili  0.5  /  DBili  x   /  AST  62<H>  /  ALT  61<H>  /  AlkPhos  161<H>  04-13

## 2021-04-14 ENCOUNTER — TRANSCRIPTION ENCOUNTER (OUTPATIENT)
Age: 61
End: 2021-04-14

## 2021-04-14 LAB
ANION GAP SERPL CALC-SCNC: 12 MMOL/L — SIGNIFICANT CHANGE UP (ref 7–14)
BUN SERPL-MCNC: 21 MG/DL — HIGH (ref 10–20)
CALCIUM SERPL-MCNC: 8.9 MG/DL — SIGNIFICANT CHANGE UP (ref 8.5–10.1)
CHLORIDE SERPL-SCNC: 98 MMOL/L — SIGNIFICANT CHANGE UP (ref 98–110)
CO2 SERPL-SCNC: 25 MMOL/L — SIGNIFICANT CHANGE UP (ref 17–32)
CREAT SERPL-MCNC: 1.2 MG/DL — SIGNIFICANT CHANGE UP (ref 0.7–1.5)
GLUCOSE BLDC GLUCOMTR-MCNC: 152 MG/DL — HIGH (ref 70–99)
GLUCOSE BLDC GLUCOMTR-MCNC: 213 MG/DL — HIGH (ref 70–99)
GLUCOSE BLDC GLUCOMTR-MCNC: 327 MG/DL — HIGH (ref 70–99)
GLUCOSE BLDC GLUCOMTR-MCNC: 359 MG/DL — HIGH (ref 70–99)
GLUCOSE SERPL-MCNC: 356 MG/DL — HIGH (ref 70–99)
HCT VFR BLD CALC: 40.8 % — LOW (ref 42–52)
HGB BLD-MCNC: 13.4 G/DL — LOW (ref 14–18)
MAGNESIUM SERPL-MCNC: 1.8 MG/DL — SIGNIFICANT CHANGE UP (ref 1.8–2.4)
MCHC RBC-ENTMCNC: 27.9 PG — SIGNIFICANT CHANGE UP (ref 27–31)
MCHC RBC-ENTMCNC: 32.8 G/DL — SIGNIFICANT CHANGE UP (ref 32–37)
MCV RBC AUTO: 84.8 FL — SIGNIFICANT CHANGE UP (ref 80–94)
NRBC # BLD: 0 /100 WBCS — SIGNIFICANT CHANGE UP (ref 0–0)
PLATELET # BLD AUTO: 354 K/UL — SIGNIFICANT CHANGE UP (ref 130–400)
POTASSIUM SERPL-MCNC: 4.4 MMOL/L — SIGNIFICANT CHANGE UP (ref 3.5–5)
POTASSIUM SERPL-SCNC: 4.4 MMOL/L — SIGNIFICANT CHANGE UP (ref 3.5–5)
RBC # BLD: 4.81 M/UL — SIGNIFICANT CHANGE UP (ref 4.7–6.1)
RBC # FLD: 12.9 % — SIGNIFICANT CHANGE UP (ref 11.5–14.5)
SARS-COV-2 RNA SPEC QL NAA+PROBE: SIGNIFICANT CHANGE UP
SODIUM SERPL-SCNC: 135 MMOL/L — SIGNIFICANT CHANGE UP (ref 135–146)
WBC # BLD: 11.07 K/UL — HIGH (ref 4.8–10.8)
WBC # FLD AUTO: 11.07 K/UL — HIGH (ref 4.8–10.8)

## 2021-04-14 PROCEDURE — 99232 SBSQ HOSP IP/OBS MODERATE 35: CPT

## 2021-04-14 PROCEDURE — 99231 SBSQ HOSP IP/OBS SF/LOW 25: CPT

## 2021-04-14 PROCEDURE — 99233 SBSQ HOSP IP/OBS HIGH 50: CPT

## 2021-04-14 RX ORDER — LISINOPRIL 2.5 MG/1
1 TABLET ORAL
Qty: 0 | Refills: 0 | DISCHARGE

## 2021-04-14 RX ORDER — LIRAGLUTIDE 6 MG/ML
0 INJECTION SUBCUTANEOUS
Qty: 0 | Refills: 0 | DISCHARGE

## 2021-04-14 RX ORDER — INSULIN GLARGINE 100 [IU]/ML
70 INJECTION, SOLUTION SUBCUTANEOUS AT BEDTIME
Refills: 0 | Status: DISCONTINUED | OUTPATIENT
Start: 2021-04-14 | End: 2021-04-15

## 2021-04-14 RX ORDER — SERTRALINE 25 MG/1
50 TABLET, FILM COATED ORAL DAILY
Refills: 0 | Status: DISCONTINUED | OUTPATIENT
Start: 2021-04-14 | End: 2021-04-15

## 2021-04-14 RX ORDER — INSULIN GLARGINE 100 [IU]/ML
70 INJECTION, SOLUTION SUBCUTANEOUS
Qty: 0 | Refills: 0 | DISCHARGE
Start: 2021-04-14

## 2021-04-14 RX ORDER — SERTRALINE 25 MG/1
1 TABLET, FILM COATED ORAL
Qty: 0 | Refills: 0 | DISCHARGE
Start: 2021-04-14

## 2021-04-14 RX ORDER — INSULIN LISPRO 100/ML
30 VIAL (ML) SUBCUTANEOUS
Refills: 0 | Status: DISCONTINUED | OUTPATIENT
Start: 2021-04-14 | End: 2021-04-15

## 2021-04-14 RX ORDER — INSULIN LISPRO 100/ML
2 VIAL (ML) SUBCUTANEOUS
Qty: 0 | Refills: 0 | DISCHARGE
Start: 2021-04-14

## 2021-04-14 RX ORDER — EMPAGLIFLOZIN 10 MG/1
1 TABLET, FILM COATED ORAL
Qty: 0 | Refills: 0 | DISCHARGE

## 2021-04-14 RX ORDER — INSULIN LISPRO 100/ML
30 VIAL (ML) SUBCUTANEOUS
Qty: 0 | Refills: 0 | DISCHARGE
Start: 2021-04-14

## 2021-04-14 RX ORDER — GABAPENTIN 400 MG/1
1 CAPSULE ORAL
Qty: 0 | Refills: 0 | DISCHARGE

## 2021-04-14 RX ADMIN — Medication 10: at 17:49

## 2021-04-14 RX ADMIN — INSULIN GLARGINE 70 UNIT(S): 100 INJECTION, SOLUTION SUBCUTANEOUS at 21:25

## 2021-04-14 RX ADMIN — Medication 1 TABLET(S): at 21:25

## 2021-04-14 RX ADMIN — ATORVASTATIN CALCIUM 40 MILLIGRAM(S): 80 TABLET, FILM COATED ORAL at 21:25

## 2021-04-14 RX ADMIN — HEPARIN SODIUM 5000 UNIT(S): 5000 INJECTION INTRAVENOUS; SUBCUTANEOUS at 05:17

## 2021-04-14 RX ADMIN — AMPICILLIN SODIUM AND SULBACTAM SODIUM 200 GRAM(S): 250; 125 INJECTION, POWDER, FOR SUSPENSION INTRAMUSCULAR; INTRAVENOUS at 05:17

## 2021-04-14 RX ADMIN — Medication 4: at 07:53

## 2021-04-14 RX ADMIN — Medication 1 TABLET(S): at 18:37

## 2021-04-14 RX ADMIN — Medication 5 MILLIGRAM(S): at 21:25

## 2021-04-14 RX ADMIN — HEPARIN SODIUM 5000 UNIT(S): 5000 INJECTION INTRAVENOUS; SUBCUTANEOUS at 13:07

## 2021-04-14 RX ADMIN — AMPICILLIN SODIUM AND SULBACTAM SODIUM 200 GRAM(S): 250; 125 INJECTION, POWDER, FOR SUSPENSION INTRAMUSCULAR; INTRAVENOUS at 12:05

## 2021-04-14 RX ADMIN — Medication 8: at 12:04

## 2021-04-14 RX ADMIN — Medication 30 UNIT(S): at 17:50

## 2021-04-14 RX ADMIN — SERTRALINE 50 MILLIGRAM(S): 25 TABLET, FILM COATED ORAL at 12:05

## 2021-04-14 RX ADMIN — CHLORHEXIDINE GLUCONATE 1 APPLICATION(S): 213 SOLUTION TOPICAL at 05:17

## 2021-04-14 RX ADMIN — HEPARIN SODIUM 5000 UNIT(S): 5000 INJECTION INTRAVENOUS; SUBCUTANEOUS at 21:25

## 2021-04-14 NOTE — PROGRESS NOTE ADULT - ASSESSMENT
· Assessment	  60 years old male known to have uncontrolled DM2, diabetic retinopathy and neuropathy, HTN, morbid obesity, CKD 3, who called EMS today after falling at home yesterday and was unable to get up.   He was noted in the ED to be tachypneic with increased respiratory effort. In the ED the FS was initially undetectable, then > 600. Labs grossly deranged, including glucose of 687, WBC of 19.2, hyponatremic to 129, BUN/Cr 38/2.2. Beta-hydroxybuturate >9, consistent with DKA. HCO3 8 > 3 s/p 3 bicarbonate iv pushes, started on bicarbonate drip. ABGs showed PH 7.17 > 7.08. Pt received 3 L NS.  The ED was unable to obtain a temperature on arrival, he was bradycardic to 50 bpm, 130/70, saturating 95% on 6L NC.  CT scan demonstrated edema/inflammatory changes in the soft tissues of the anterior pelvis involving the left groin and pubic region. Small 2.1 cm fluid pocket along the skin surface. No soft tissue gas identified.     IMPRESSION;  Resolved Sepsis secondary to a complex multiloculated abscess pelvic area/ left groin  4/8 S/p dmultiple drainage of abscess  Presently wound with no evidence of ongoing infection. S/p closure  Gm stain : MAYITO, E fecalis, CHNS, C albicans, GNRs. All colonizers and have no therapeutic implications  WBC 20.5>11.7>8.4  Scrotum not infected  Perineum with no infection  Renal failure : resolved  BCx 4/7 NGTD    RECOMMENDATIONS;  po Augmentin 875 mg q12h for 8 more days  recall prn please

## 2021-04-14 NOTE — DIETITIAN INITIAL EVALUATION ADULT. - OTHER CALCULATIONS
wt used: 116.6kg CBW 78kg IBW kcal: 2000-2200kcal (MSJ x 1-1.1AF) -- bmi>30 protein: 78-94g (1-1.2g/kg IBW) -- bmi>30 fluids: 1ml/kcal or per LIP

## 2021-04-14 NOTE — DIETITIAN INITIAL EVALUATION ADULT. - PERTINENT LABORATORY DATA
(4/13) H/H 12.4/37.9, BUN 24, , eGFR 50, Mg 1.6, A1c >15.5% (4/8)   CAPILLARY BLOOD GLUCOSE  POCT Blood Glucose.: 213 mg/dL (14 Apr 2021 07:20)  POCT Blood Glucose.: 321 mg/dL (13 Apr 2021 21:06)  POCT Blood Glucose.: 245 mg/dL (13 Apr 2021 11:48)

## 2021-04-14 NOTE — PROGRESS NOTE BEHAVIORAL HEALTH - OTHER
Deferred due to leg pain.

## 2021-04-14 NOTE — DIETITIAN INITIAL EVALUATION ADULT. - NAME AND PHONE
Nutrition Interventions: meals and snacks RD to monitor diet order, energy intake, body composition, NFPF, glucose profile Nutrition Goal: Pt to meet greater than 75% of estimated needs within 7 days

## 2021-04-14 NOTE — PROGRESS NOTE ADULT - ASSESSMENT
59 yo M PMHx uncontrolled DM2, diabetic retinopathy, and neuropathy, HTN, morbid obesity presented to ED after fall. Admitted with diagnosis of DKA 2/2 sepsis 2/2 necrotizing pubic abscess    # Sepsis 2/2 necrotizing pubic abscess  - Sepsis resolved  - s/p 3 debridements on 4/8, 4/10 and 4/12, has drain in  - Bcx 4/7 show NGT   - Abscess Cx grew S.aureus, enterococcus feacalis and candida  - s/p zyvox, cefepime, flagyl and diflucan  - c/w unasyn 3 gram q6 IV   - will d/c on PO augmentin 875 mg for 10 days  - outpatient burn follow up, no further debridements this admission  - ID and burn following    # Depression with active suicidal ideation   - As per patient's sister, patient was depressed and not taking care of himself.  - Evaluated by psych and placed on a 1:1 sit.   - As per psych pt is deemed dangerous to himself and others, will need hospitalizations once medically cleared    # Metabolic acidosis secondary to DKA   - on admission PH 7.17 > 7.08, HCO3 8 > 5, B-OH>9.0   - HbA1c >15.5% possible medication noncompliance.  - follows with Dr. Vick OP   - s/p insulin gtt.   - c/w insulin 50/20/20/20 and tolerating diet     # Pseudo hyponatremia on admission  - resolved   - Na corrected for Glucose was 141 on admission likely secondary to hyperglycemia    # HTN   - BP borderline low,   - holding home meds for now    # VALDEZ on CKD  - Resolved  - Cr. 2.8 on admission. Baseline 1.6  - improved fluids, likely prerenal    # Obesity    # Misc  DVT ppx: heparin   GI ppx: not indicated  Diet: Carb consistent  Activity: IAT  Code status: Full  Dispo: inpatient psych     61 yo M PMHx uncontrolled DM2, diabetic retinopathy, and neuropathy, HTN, morbid obesity presented to ED after fall. Admitted with diagnosis of DKA 2/2 sepsis 2/2 necrotizing pubic abscess    # Sepsis 2/2 necrotizing pubic abscess  - Sepsis resolved  - s/p 3 debridements on 4/8, 4/10 and 4/12, drain removed 4/13  - Bcx 4/7 show NGT   - Abscess Cx grew S.aureus, enterococcus feacalis and candida  - s/p zyvox, cefepime, flagyl and diflucan  - c/w unasyn 3 gram q6 IV   - will d/c on PO augmentin 875 mg for 10 days  - outpatient burn follow up, no further debridements this admission  - ID and burn following    # Depression with active suicidal ideation   - As per patient's sister, patient was depressed and not taking care of himself.  - Evaluated by psych and placed on a 1:1 sit.   - As per psych pt is deemed dangerous to himself and others, will need hospitalizations once medically cleared  - started zoloft 50 mg daily     # Metabolic acidosis secondary to DKA   - on admission PH 7.17 > 7.08, HCO3 8 > 5, B-OH>9.0   - HbA1c >15.5% possible medication noncompliance.  - follows with Dr. Vick OP   - s/p insulin gtt.   - c/w insulin 50/20/20/20 and tolerating diet     # Pseudo hyponatremia on admission  - resolved   - Na corrected for Glucose was 141 on admission likely secondary to hyperglycemia    # HTN   - BP borderline low,   - holding home meds for now    # VALDEZ on CKD  - Resolved  - Cr. 2.8 on admission. Baseline 1.6  - improved fluids, likely prerenal    # Obesity    # Misc  DVT ppx: heparin   GI ppx: not indicated  Diet: Carb consistent  Activity: IAT  Code status: Full  Dispo: inpatient psych once bed available

## 2021-04-14 NOTE — PROGRESS NOTE ADULT - SUBJECTIVE AND OBJECTIVE BOX
SUBJECTIVE:    Patient is a 60y old Male who presents with a chief complaint of DKA (13 Apr 2021 11:43)    Currently admitted to medicine with the primary diagnosis of Diabetic ketoacidosis       Today is hospital day 7d. This morning he is resting comfortably in bed and reports no new issues or overnight events.     PAST MEDICAL & SURGICAL HISTORY  Diabetes mellitus    Dyslipidemia    Hypertension    No significant past surgical history    ALLERGIES:  No Known Allergies    MEDICATIONS:  STANDING MEDICATIONS  ampicillin/sulbactam  IVPB 3 Gram(s) IV Intermittent every 6 hours  atorvastatin 40 milliGRAM(s) Oral at bedtime  calcium carbonate    500 mG (Tums) Chewable 1 Tablet(s) Chew at bedtime  chlorhexidine 4% Liquid 1 Application(s) Topical <User Schedule>  dextrose 40% Gel 15 Gram(s) Oral once  dextrose 40% Gel 15 Gram(s) Oral once  dextrose 5%. 1000 milliLiter(s) IV Continuous <Continuous>  dextrose 5%. 1000 milliLiter(s) IV Continuous <Continuous>  dextrose 50% Injectable 25 Gram(s) IV Push once  dextrose 50% Injectable 12.5 Gram(s) IV Push once  dextrose 50% Injectable 25 Gram(s) IV Push once  dextrose 50% Injectable 25 Gram(s) IV Push once  dextrose 50% Injectable 12.5 Gram(s) IV Push once  dextrose 50% Injectable 25 Gram(s) IV Push once  glucagon  Injectable 1 milliGRAM(s) IntraMuscular once  glucagon  Injectable 1 milliGRAM(s) IntraMuscular once  heparin   Injectable 5000 Unit(s) SubCutaneous every 8 hours  insulin glargine Injectable (LANTUS) 55 Unit(s) SubCutaneous at bedtime  insulin lispro (ADMELOG) corrective regimen sliding scale   SubCutaneous three times a day before meals  insulin lispro (ADMELOG) corrective regimen sliding scale   SubCutaneous three times a day before meals  insulin lispro Injectable (ADMELOG) 25 Unit(s) SubCutaneous once  melatonin 5 milliGRAM(s) Oral at bedtime    PRN MEDICATIONS  lactulose Syrup 10 Gram(s) Oral every 6 hours PRN  ondansetron Injectable 4 milliGRAM(s) IV Push once PRN  ondansetron Injectable 4 milliGRAM(s) IV Push once PRN    VITALS:   T(F): 98  HR: 72  BP: 113/55  RR: 19  SpO2: --    LABS:                        12.4   11.28 )-----------( 300      ( 13 Apr 2021 07:38 )             37.9     04-13    136  |  99  |  24<H>  ----------------------------<  312<H>  4.3   |  24  |  1.5    Ca    8.7      13 Apr 2021 07:38  Mg     1.6     04-13    TPro  5.5<L>  /  Alb  3.0<L>  /  TBili  0.5  /  DBili  x   /  AST  62<H>  /  ALT  61<H>  /  AlkPhos  161<H>  04-13              Culture - Acid Fast - Other w/Smear (collected 12 Apr 2021 17:05)  Source: .Other pubis          RADIOLOGY:  < from: CT Abdomen and Pelvis w/ IV Cont (04.07.21 @ 16:05) >  Edema/inflammatory changes in the soft tissues of the anterior pelvis involving the left groin and pubic region. Small 2.1 cm fluid pocket along the skin surface. No soft tissue gas identified.    < end of copied text >    < from: Xray Chest 1 View- PORTABLE-Urgent (Xray Chest 1 View- PORTABLE-Urgent .) (04.07.21 @ 19:10) >  No radiographic evidence of acute cardiopulmonary disease.    < end of copied text >      PHYSICAL EXAM:  GEN: No acute distress  LUNGS: Clear to auscultation bilaterally   HEART: Regular  ABD: Soft, non-tender, non-distended.  EXT: No edema  NEURO: AAOX3      Intravenous access:   NG tube:   Latif Catheter:   Indwelling Urethral Catheter:     Connect To:  Straight Drainage/[place]    Indication:  Urinary Retention / Obstruction (04-12-21 @ 08:21) (not performed) [Active]       SUBJECTIVE:    Patient is a 60y old Male who presents with a chief complaint of DKA (13 Apr 2021 11:43)    Currently admitted to medicine with the primary diagnosis of Diabetic ketoacidosis       Today is hospital day 7d. This morning he is resting comfortably in bed and reports no new issues or overnight events. Pt eating his breakfast happily. PCA at bedside    PAST MEDICAL & SURGICAL HISTORY  Diabetes mellitus    Dyslipidemia    Hypertension    No significant past surgical history    ALLERGIES:  No Known Allergies    MEDICATIONS:  STANDING MEDICATIONS  ampicillin/sulbactam  IVPB 3 Gram(s) IV Intermittent every 6 hours  atorvastatin 40 milliGRAM(s) Oral at bedtime  calcium carbonate    500 mG (Tums) Chewable 1 Tablet(s) Chew at bedtime  chlorhexidine 4% Liquid 1 Application(s) Topical <User Schedule>  dextrose 40% Gel 15 Gram(s) Oral once  dextrose 40% Gel 15 Gram(s) Oral once  dextrose 5%. 1000 milliLiter(s) IV Continuous <Continuous>  dextrose 5%. 1000 milliLiter(s) IV Continuous <Continuous>  dextrose 50% Injectable 25 Gram(s) IV Push once  dextrose 50% Injectable 12.5 Gram(s) IV Push once  dextrose 50% Injectable 25 Gram(s) IV Push once  dextrose 50% Injectable 25 Gram(s) IV Push once  dextrose 50% Injectable 12.5 Gram(s) IV Push once  dextrose 50% Injectable 25 Gram(s) IV Push once  glucagon  Injectable 1 milliGRAM(s) IntraMuscular once  glucagon  Injectable 1 milliGRAM(s) IntraMuscular once  heparin   Injectable 5000 Unit(s) SubCutaneous every 8 hours  insulin glargine Injectable (LANTUS) 55 Unit(s) SubCutaneous at bedtime  insulin lispro (ADMELOG) corrective regimen sliding scale   SubCutaneous three times a day before meals  insulin lispro (ADMELOG) corrective regimen sliding scale   SubCutaneous three times a day before meals  insulin lispro Injectable (ADMELOG) 25 Unit(s) SubCutaneous once  melatonin 5 milliGRAM(s) Oral at bedtime    PRN MEDICATIONS  lactulose Syrup 10 Gram(s) Oral every 6 hours PRN  ondansetron Injectable 4 milliGRAM(s) IV Push once PRN  ondansetron Injectable 4 milliGRAM(s) IV Push once PRN    VITALS:   T(F): 98  HR: 72  BP: 113/55  RR: 19  SpO2: --    LABS:                        12.4   11.28 )-----------( 300      ( 13 Apr 2021 07:38 )             37.9     04-13    136  |  99  |  24<H>  ----------------------------<  312<H>  4.3   |  24  |  1.5    Ca    8.7      13 Apr 2021 07:38  Mg     1.6     04-13    TPro  5.5<L>  /  Alb  3.0<L>  /  TBili  0.5  /  DBili  x   /  AST  62<H>  /  ALT  61<H>  /  AlkPhos  161<H>  04-13              Culture - Acid Fast - Other w/Smear (collected 12 Apr 2021 17:05)  Source: .Other pubis          RADIOLOGY:  < from: CT Abdomen and Pelvis w/ IV Cont (04.07.21 @ 16:05) >  Edema/inflammatory changes in the soft tissues of the anterior pelvis involving the left groin and pubic region. Small 2.1 cm fluid pocket along the skin surface. No soft tissue gas identified.    < end of copied text >    < from: Xray Chest 1 View- PORTABLE-Urgent (Xray Chest 1 View- PORTABLE-Urgent .) (04.07.21 @ 19:10) >  No radiographic evidence of acute cardiopulmonary disease.    < end of copied text >      PHYSICAL EXAM:  GEN: No acute distress  LUNGS: Clear to auscultation bilaterally   HEART: Regular  ABD: Soft, non-tender, non-distended.  EXT: No edema  NEURO: AAOX3      Intravenous access:   NG tube:   Latif Catheter:   Indwelling Urethral Catheter:     Connect To:  Straight Drainage/[place]    Indication:  Urinary Retention / Obstruction (04-12-21 @ 08:21) (not performed) [Active]

## 2021-04-14 NOTE — PROGRESS NOTE ADULT - ASSESSMENT
OR 4/12- debridement   OR 04/09 - debridement  OR 04/08 - debridement   4/12 debridment with primary closure    - large dressing change today  -staples, FELIPE drain removed at bedside. Okay to DC from burn perspective; F/u in clinic within 1 week of d/c  -Continue wound care/ dressing changes -dry ABD over incision site, secure with tape  - Wound cx results- 04/08; 04/09; 04/10 - Candida Albicans  -Unasyn 3 gm iv q6h - per ID can change to PO abx now that wound is closed  - Medical management per Primary team     Recall PRN

## 2021-04-14 NOTE — DISCHARGE NOTE PROVIDER - NSDCFUADDINST_GEN_ALL_CORE_FT
- Please follow up with burn in 1 week from today 4/14  - Continue pubic wound care/ dressing changes -dry ABD over incision site, secure with tape  - Please follow with endocrinology Dr. Cormier 1-2 days after discharge from psychiatry hospital   You will continue on insulin 70 at bedtime and 30 units before meals along with pioglizatone 30 mg daily while in psychiatry hospital.  When going home you will take pioglizatone 30, Victoza and Jardiance 10 mg daily and follow up with Dr. Cormier immediately after discharge.   - Please follow up with burn in 1 week from today 4/14  - Continue pubic wound care/ dressing changes -dry ABD over incision site, secure with tape  - Please follow with endocrinology Dr. Cormier 1-2 days after discharge from psychiatry hospital   You will continue on insulin 70 at bedtime and 30 units before meals along with pioglizatone 30 mg daily while in psychiatry hospital.  When going home you will take pioglizatone 30, Victoza and Jardiance 10 mg daily and follow up with Dr. Cormier immediately after discharge.    ** Pt's medication lisinopril held ----> can be restarted once BP improves.   - Please follow up with burn in 1 week from today 4/14  - Continue pubic wound care/ dressing changes -dry ABD over incision site, secure with tape  - Please follow with endocrinology Dr. Cormier 1-2 days after discharge from psychiatry hospital   You will continue on insulin 70 at bedtime and 30 units before meals while in psychiatry hospital.  When going home you will take pioglizatone 30, Victoza and Jardiance 10 mg daily and follow up with Dr. Cormier immediately after discharge.    ** Pt's medication lisinopril held ----> can be restarted once BP improves.

## 2021-04-14 NOTE — DIETITIAN INITIAL EVALUATION ADULT. - OTHER INFO
61 yo M with PMH uncontrolled DM2, diabetic retinopathy, and neuropathy, HTN, morbid obesity presented to ED after fall. Admitted with diagnosis of DKA 2/2 sepsis 2/2 necrotizing pubic abscess. POD #2 s/p debridement  of pubis and primary closure with FELIPE drain. Per psych 1:1 sit as high suicide risk. Dispo in-patient psych once bed available.     Pt reports good appetite and po intake PTA. Eats take out, mostly Italian such as pasta and pizza. Does not follow any dietary restrictions as food options are limited, pt with no home, money or ability to transport. Denies use of oral nutrition supplements, vitamins, or minerals. Confirms NKFA. No Advent or cultural food preferences. Denies difficulties chewing or swallowing. Has some teeth missing, poor dentition but does not impact his intake.     In house has 1:1 sit, new to pt but reports ate 100% of his breakfast. Pt reports appetite has been good, eating all of his meals. Pt asking why he's getting pureed foods. No s/s eval noted in EMR. No c/o N+V, constipation or diarrhea. Bowel regimen in place.     Unsure of UBW, unable to weigh self. No previous adms or RD notes. No apparent signs of muscle wasting or fat loss.     Ht:  70"   Wt:  257lbs   IBW:  166lbs +/-10%    BMI: 36.9  kg/m2    Skin: No pressure injuries per RN flow sheets  Edema: +1 generalized edema noted per RN flow sheets

## 2021-04-14 NOTE — PROGRESS NOTE ADULT - SUBJECTIVE AND OBJECTIVE BOX
Patient is a 60y old  pmhx of  uncontrolled DM2, diabetic retinopathy and neuropathy, HTN, morbid obesity, CKD 3, admitted with a chief complaint of DKA, found to have pelvic abscess . POD #2 s/p debridement  of pubis and primary closure with FELIPE drain    AM rounds :   - no events o/n  - pt states able to urinate without difficulty        PHYSICAL EXAM    Vital Signs Last 24 Hrs  T(C): 36.2 (14 Apr 2021 07:50), Max: 36.7 (13 Apr 2021 15:36)  T(F): 97.2 (14 Apr 2021 07:50), Max: 98.1 (13 Apr 2021 15:36)  HR: 66 (14 Apr 2021 07:50) (66 - 75)  BP: 114/56 (14 Apr 2021 07:50) (106/59 - 114/56)  BP(mean): 76 (13 Apr 2021 15:36) (76 - 76)  RR: 20 (14 Apr 2021 07:50) (18 - 20)        PHYSICAL EXAM:  GENERAL: NAD,   HEAD:  Atraumatic, Normocephalic  CHEST/LUNG: breathing comfortably on room air, speaking in full sentences w/o difficulty.   HEART: In no acute cardiopulmonary distress.   SKIN: Pubis: wound, s/p debridement, with staples in place, incision is clean and dry , no purulence noted. no malodor. no active bleeding   FELIPE drain with ~5cc drainage

## 2021-04-14 NOTE — PROGRESS NOTE ADULT - SUBJECTIVE AND OBJECTIVE BOX
Reason for Endocrinology Consult: Diabetes    HPI: 60y Male    Outpatient Endocrinologist?    PAST MEDICAL & SURGICAL HISTORY:  Diabetes mellitus    Dyslipidemia    Hypertension    No significant past surgical history      FAMILY HISTORY:      SH:  Smoking  Etoh:  Recreational Drugs:    Home Medications:  atorvastatin 40 mg oral tablet: 1 tab(s) orally once a day (07 Apr 2021 19:05)  gabapentin 600 mg oral tablet: 1 tab(s) orally 3 times a day (07 Apr 2021 19:05)  Jardiance 10 mg oral tablet: 1 tab(s) orally once a day (in the morning) (07 Apr 2021 19:05)  lisinopril 20 mg oral tablet: 1 tab(s) orally once a day (07 Apr 2021 19:05)  pioglitazone 30 mg oral tablet: 1 tab(s) orally once a day (07 Apr 2021 19:05)  Victoza 18 mg/3 mL subcutaneous solution:  (07 Apr 2021 19:05)      Current (Non-Endocrine) Meds:  amoxicillin  875 milliGRAM(s)/clavulanate 1 Tablet(s) Oral every 12 hours  calcium carbonate    500 mG (Tums) Chewable 1 Tablet(s) Chew at bedtime  chlorhexidine 4% Liquid 1 Application(s) Topical <User Schedule>  dextrose 5%. 1000 milliLiter(s) IV Continuous <Continuous>  dextrose 5%. 1000 milliLiter(s) IV Continuous <Continuous>  heparin   Injectable 5000 Unit(s) SubCutaneous every 8 hours  lactulose Syrup 10 Gram(s) Oral every 6 hours PRN  melatonin 5 milliGRAM(s) Oral at bedtime  ondansetron Injectable 4 milliGRAM(s) IV Push once PRN  ondansetron Injectable 4 milliGRAM(s) IV Push once PRN  sertraline 50 milliGRAM(s) Oral daily      Current Endocrine Meds:   atorvastatin 40 milliGRAM(s) Oral at bedtime  dextrose 40% Gel 15 Gram(s) Oral once  dextrose 40% Gel 15 Gram(s) Oral once  dextrose 50% Injectable 25 Gram(s) IV Push once  dextrose 50% Injectable 12.5 Gram(s) IV Push once  dextrose 50% Injectable 25 Gram(s) IV Push once  dextrose 50% Injectable 25 Gram(s) IV Push once  dextrose 50% Injectable 12.5 Gram(s) IV Push once  dextrose 50% Injectable 25 Gram(s) IV Push once  glucagon  Injectable 1 milliGRAM(s) IntraMuscular once  glucagon  Injectable 1 milliGRAM(s) IntraMuscular once  insulin glargine Injectable (LANTUS) 55 Unit(s) SubCutaneous at bedtime  insulin lispro (ADMELOG) corrective regimen sliding scale   SubCutaneous three times a day before meals  insulin lispro (ADMELOG) corrective regimen sliding scale   SubCutaneous three times a day before meals  insulin lispro Injectable (ADMELOG) 25 Unit(s) SubCutaneous once      Allergies:  No Known Allergies      ROS:  Denies the following except as indicated.    General: weight loss/weight gain, decreased appetite, fatigue, fever  Eyes: blurry vision, double vision  ENT: neck swelling, dysphagia, voice changes   CV: palpitations, SOB, chest pain, cough  GI: nausea, vomiting, diarrhea, constipation, abdominal pain  : nocturia,  polyuria, dysuria  Endo: decreased libido, heat/cold intolerance, jitteriness  MSK: arthralgias, myalgias  Skin: rash, dryness, diaphoresis  Neuro: pedal numbness,pedal paresthesias, pedal pain    Height (cm): 177.8 (04-14 @ 11:24)  Weight (kg): 116.6 (04-12 @ 16:30)  BMI (kg/m2): 36.9 (04-14 @ 11:24)    Vital Signs Last 24 Hrs  T(C): 36.2 (14 Apr 2021 07:50), Max: 36.7 (13 Apr 2021 15:36)  T(F): 97.2 (14 Apr 2021 07:50), Max: 98.1 (13 Apr 2021 15:36)  HR: 66 (14 Apr 2021 07:50) (66 - 75)  BP: 114/56 (14 Apr 2021 07:50) (106/59 - 114/56)  BP(mean): 76 (13 Apr 2021 15:36) (76 - 76)  RR: 20 (14 Apr 2021 07:50) (18 - 20)  SpO2: --  Constitutional: WN/WD in NAD.   Neck: no thyromegaly or palpable thyroid nodules   Respiratory: lungs CTAB.  Cardiovascular: regular rate and rhythm, normal S1 and S2, no audible murmurs  GI: soft, NT/ND, no masses/HSM appreciated.  Ext: no edema, no ulcers, pedal pulses palpable bilaterally  Neurology: no tremor, monofilament sensation intact in feet  Psychiatric: A&O x 3, normal affect/mood.        LABS:                        13.4   11.07 )-----------( 354      ( 14 Apr 2021 11:36 )             40.8     04-14    135  |  98  |  21<H>  ----------------------------<  356<H>  4.4   |  25  |  1.2    Ca    8.9      14 Apr 2021 11:36  Mg     1.8     04-14    TPro  5.5<L>  /  Alb  3.0<L>  /  TBili  0.5  /  DBili  x   /  AST  62<H>  /  ALT  61<H>  /  AlkPhos  161<H>  04-13                               Thyroid Stimulating Hormone, Serum: 0.30 (04-11 @ 07:30)          RADIOLOGY & ADDITIONAL STUDIES:    A/P:60yMale    1.  DM  For now:  Glargine-    units at bedtime  Lispro-    units three times a day before meals   Will continue to monitor     At discharge, recommend:      Pt can follow up at discharge with:    Above discussed with resident.

## 2021-04-14 NOTE — DISCHARGE NOTE PROVIDER - CARE PROVIDER_API CALL
Ruben Mckeon)  Plastic Surgery  [address] [place] [address]  Phone: [phone]  Fax: [phone]  Follow Up Time: 1 week    [place] Cormier  INTERNAL MEDICINE  [address] [place] [address]  Phone: [phone]  Fax: [phone]  Follow Up Time: 1 week    Leatha Rivera  INTERNAL MEDICINE  [address] [place] [id number]  Phone: [phone]  Fax: [phone]  Follow Up Time: 2 weeks

## 2021-04-14 NOTE — PROGRESS NOTE BEHAVIORAL HEALTH - NSBHFUPINTERVALHXFT_PSY_A_CORE
Patient seen and interviewed , 1 to 1 staff at bedside. Patient seen and interviewed , 1 to 1 staff at bedside.  Patient reports that he is feeling better and is worried about not receiving physical therapy on the psychiatry floor. he reports that he is depressed but no longer has suicidal thoughts. patient was informed that he will be receiving physical therapy when he gets to the inpatient psychiatry unit. According to the medical team, patient is medically cleared and his antibiotics will be converted to the intravenous formulation.

## 2021-04-14 NOTE — DIETITIAN INITIAL EVALUATION ADULT. - PERSON TAUGHT/METHOD
Offered to pt nutrition education, verbally and written handouts. Pt declined, states he is unable to follow any diet d/t financial concerns. Eats what he can get. Offered glucerna coupons, pt declined. No money to pay for them/unable to get himself to the grocery store to buy oral nutrition supplements. At this time nutrition education/discharge materials inappropriate d/t housing/financial concern/lack of interest/motivation to learn./verbal instruction/patient instructed

## 2021-04-14 NOTE — DISCHARGE NOTE PROVIDER - PROVIDER TOKENS
PROVIDER:[TOKEN:[[id number]],FOLLOWUP:[1 week]],PROVIDER:[TOKEN:[[id number]],FOLLOWUP:[1 week]],PROVIDER:[TOKEN:[[id number]],FOLLOWUP:[2 weeks]]

## 2021-04-14 NOTE — DISCHARGE NOTE PROVIDER - HOSPITAL COURSE
61 yo M PMHx uncontrolled DM2, diabetic retinopathy, and neuropathy, HTN, morbid obesity presented to ED after fall. Admitted with diagnosis of DKA 2/2 sepsis 2/2 necrotizing pubic abscess  DKA resolved. Underwent 3 debridements on 4/8, 4/10 and 4/12. Will d/c on augmentin PO for 8 days with burn and ID follow up.  Pt expressed suicidal ideation, psych evaluated him, will be transferred to inpatient psych    61 yo M PMHx uncontrolled DM2, diabetic retinopathy, and neuropathy, HTN, morbid obesity presented to ED after fall. Admitted with diagnosis of DKA 2/2 sepsis 2/2 necrotizing pubic abscess  DKA resolved. Underwent 3 debridements on 4/8, 4/10 and 4/12. Will d/c on augmentin PO for 8 days with burn and ID follow up.  Pt expressed suicidal ideation, psych evaluated him, will be transferred to inpatient psych     # Sepsis POA resolved  # Left pubic abscess  -  CT Abdomen and Pelvis w/ IV Cont (04.07.21 @ 16:05) >Edema/inflammatory changes in the soft tissues of the anterior pelvis involving the left groin and pubic region. Small 2.1 cm fluid pocket along the skin surface. No soft tissue gas identified.  - blood Culture Results: No growth to date. (04.07.21 @ 12:08)  - wound Culture Results: Few Candida albicans "Susceptibilities not performed" (04.10.21 @ 14:00)  -  s/p debridement on 4/8, 410, 4/12 debridment with primary closure  - Burn F/u: staples, FELIPE drain removed at bedside. Okay to DC from burn perspective; F/u in clinic within 1 week of d/c  -Continue wound care/ dressing changes -dry ABD over incision site, secure with tape  - S/p unasyn. po Augmentin 875 mg q12h for 8 more days    # Depression with active suicidal ideation   - evaluated by psychiatry: depression with active suicidal ideation. This is in the context of social stressors. At this time, patient is considered a danger to himself and others and would benefit from inpatient psychiatric hospitalization upon medical clearance and bed availability. There is no acute indication for starting any psychotropic medication  - c/w 1:1 monitoring for high suicide risk  -  Discharge to to IPP     # DKA-resolved  # DM type2  - A1C with Estimated Average Glucose Result: >15.5: % (04.08.21 @ 04:53)  - evaluated by endocrine: add pioglitazone 30mg. daily, and increase glargine dosage to 70 units daily, his other diabetes meds are non formulary, and can be resumed as an outpatient.  - increase lantus, lispro, actos      # Acute kidney injury -resolved  # CKD stage 2 -stable  - hold lisinopril    # Hypomagnesemia-resolved    # Hypertension- stable  - holding lisinopril    # Dyslipidemia  - c/w statin    # Obesity  - BMI: 36     59 yo M PMHx uncontrolled DM2, diabetic retinopathy, and neuropathy, HTN, morbid obesity presented to ED after fall. Admitted with diagnosis of DKA 2/2 sepsis 2/2 necrotizing pubic abscess  DKA resolved. Underwent 3 debridements on 4/8, 4/10 and 4/12. Will d/c on augmentin PO for 8 days with burn and ID follow up.  Pt expressed suicidal ideation, psych evaluated him, will be transferred to inpatient psych     # Sepsis POA resolved  # Left pubic abscess  -  CT Abdomen and Pelvis w/ IV Cont (04.07.21 @ 16:05) >Edema/inflammatory changes in the soft tissues of the anterior pelvis involving the left groin and pubic region. Small 2.1 cm fluid pocket along the skin surface. No soft tissue gas identified.  - blood Culture Results: No growth to date. (04.07.21 @ 12:08)  - wound Culture Results: Few Candida albicans "Susceptibilities not performed" (04.10.21 @ 14:00)  -  s/p debridement on 4/8, 410, 4/12 debridment with primary closure  - Burn F/u: staples, FELIPE drain removed at bedside. Okay to DC from burn perspective; F/u in clinic within 1 week of d/c  -Continue wound care/ dressing changes -dry ABD over incision site, secure with tape  - S/p unasyn. po Augmentin 875 mg q12h for 8 more days    # Depression with active suicidal ideation   - evaluated by psychiatry: depression with active suicidal ideation. This is in the context of social stressors. At this time, patient is considered a danger to himself and others and would benefit from inpatient psychiatric hospitalization upon medical clearance and bed availability. There is no acute indication for starting any psychotropic medication  - c/w 1:1 monitoring for high suicide risk  -  Discharge to to IPP     # DKA-resolved  # DM type2  - A1C with Estimated Average Glucose Result: >15.5: % (04.08.21 @ 04:53)  - evaluated by endocrine: add pioglitazone 30mg. daily, and increase glargine dosage to 70 units daily, his other diabetes meds are non formulary, and can be resumed as an outpatient.  - increase lantus, lispro,      # Acute kidney injury -resolved  # CKD stage 2 -stable  - hold lisinopril    # Hypomagnesemia-resolved    # Hypertension- stable  - holding lisinopril    # Dyslipidemia  - c/w statin    # Obesity  - BMI: 36

## 2021-04-14 NOTE — DISCHARGE NOTE PROVIDER - NSDCCPCAREPLAN_GEN_ALL_CORE_FT
PRINCIPAL DISCHARGE DIAGNOSIS  Diagnosis: Diabetic ketoacidosis  Assessment and Plan of Treatment: You came in with very high glucose levels. You were admitted to ICU and required intravenous insulin. Your blood sugar was then better controlled. You will continus on insulin 70 at bedtime and 30 units before meals along with pioglizatone while in psychiatry hospital. When going home you will take pioglizatone, victoza and jardiance and follow up with Dr. Cormier immediately after discharge.      SECONDARY DISCHARGE DIAGNOSES  Diagnosis: Necrotizing soft tissue infection  Assessment and Plan of Treatment: You had an infection in your pubic area. You underwent 3 debridements with burn team. They removed the drain on 4/14. You were treated with intraenous antibiotics. You will be discharged on antibiotic pills for 8 more days. Please take your medications as prescribed and follow up with burn team in a week after discharge.    Diagnosis: Depression  Assessment and Plan of Treatment: You expressed suicial ideation this admission and was diagnosed with depression. You were evaluated by psychiatry team and they recommended starting you on a medication. You will be transferred to psychiatry hospital.     PRINCIPAL DISCHARGE DIAGNOSIS  Diagnosis: Diabetic ketoacidosis  Assessment and Plan of Treatment: You came in with very high glucose levels. You were admitted to ICU and required intravenous insulin. Your blood sugar was then better controlled. You will continus on insulin 70 at bedtime and 30 units before meals while in psychiatry hospital. When going home you will take pioglizatone, victoza and jardiance your usual home dose and follow up with Dr. Cormier immediately after discharge.      SECONDARY DISCHARGE DIAGNOSES  Diagnosis: Necrotizing soft tissue infection  Assessment and Plan of Treatment: You had an infection in your pubic area. You underwent 3 debridements with burn team. They removed the drain on 4/14. You were treated with intraenous antibiotics. You will be discharged on antibiotic pills for 8 more days. Please take your medications as prescribed and follow up with burn team in a week after discharge.    Diagnosis: Depression  Assessment and Plan of Treatment: You expressed suicial ideation this admission and was diagnosed with depression. You were evaluated by psychiatry team and they recommended starting you on a medication. You will be transferred to psychiatry hospital.

## 2021-04-14 NOTE — PROGRESS NOTE ADULT - GENITOURINARY COMMENTS
Sx site looks remarkably clean, good granulation tissue, no necrosis
wound no necrosis/erythema, approximated well
pelvic area packed

## 2021-04-14 NOTE — PROGRESS NOTE ADULT - ASSESSMENT
uncontrolled type 2 diabetes, please add pioglitazone 30mg. daily, and increase glargine dosage to 70 units daily, his other diabetes meds are non formulary, and can be resumed as an outpatient.

## 2021-04-14 NOTE — PROGRESS NOTE ADULT - ASSESSMENT
60 years old male known to have uncontrolled DM2, diabetic retinopathy and neuropathy, HTN, morbid obesity, CKD 3, who called EMS after falling at home  and was unable to get up.       # Sepsis POA resolved  # Left pubic abscess  -  CT Abdomen and Pelvis w/ IV Cont (04.07.21 @ 16:05) >Edema/inflammatory changes in the soft tissues of the anterior pelvis involving the left groin and pubic region. Small 2.1 cm fluid pocket along the skin surface. No soft tissue gas identified.  - blood Culture Results: No growth to date. (04.07.21 @ 12:08)  - wound Culture Results: Few Candida albicans "Susceptibilities not performed" (04.10.21 @ 14:00)  -  s/p debridement on 4/8, 410, 4/12 debridment with primary closure  - Burn F/u: staples, FELIPE drain removed at bedside. Okay to DC from burn perspective; F/u in clinic within 1 week of d/c  -Continue wound care/ dressing changes -dry ABD over incision site, secure with tape  - S/p unasyn. po Augmentin 875 mg q12h for 8 more days    # Depression with active suicidal ideation   - evaluated by psychiatry: depression with active suicidal ideation. This is in the context of social stressors. At this time, patient is considered a danger to himself and others and would benefit from inpatient psychiatric hospitalization upon medical clearance and bed availability. There is no acute indication for starting any psychotropic medication  - c/w 1:1 monitoring for high suicide risk  - Psych F/u for IPP    # DKA-resolved  # DM type2  - A1C with Estimated Average Glucose Result: >15.5: % (04.08.21 @ 04:53)  - evaluated by endocrine: add pioglitazone 30mg. daily, and increase glargine dosage to 70 units daily, his other diabetes meds are non formulary, and can be resumed as an outpatient.  - increase lantus, lispro      # Acute kidney injury -resolved  # CKD stage 2 -stable  - hold lisinopril    # Hypomagnesemia-resolved    # Hypertension- stable  - holding lisinopril    # Dyslipidemia  - c/w statin    # Obesity  - BMI: 36    # DVT prophylaxis    # Full code    # Pending: Pt medically cleared for discharge to [facility], awaiting bed  # Discussed plan of care with patient  # Disposition: inpatient psychiatry

## 2021-04-14 NOTE — DISCHARGE NOTE PROVIDER - CARE PROVIDERS DIRECT ADDRESSES
[email or web address]

## 2021-04-14 NOTE — DIETITIAN INITIAL EVALUATION ADULT. - ADD RECOMMEND
Change diet to regular as pt with no issues chewing/swallowing. Continue with DASH/TLC, consistent carbohydrate diet modifiers.

## 2021-04-14 NOTE — PROGRESS NOTE ADULT - SUBJECTIVE AND OBJECTIVE BOX
THOMAS HARDIN  60y, Male    All available historical data reviewed    OVERNIGHT EVENTS:  no fevers  feels well and has no complaints      ROS:  General: Denies rigors, nightsweats  HEENT: Denies headache, rhinorrhea, sore throat, eye pain  CV: Denies CP, palpitations  PULM: Denies wheezing, hemoptysis  GI: Denies hematemesis, hematochezia, melena  : Denies discharge, hematuria  MSK: Denies arthralgias, myalgias  SKIN: Denies rash, lesions  NEURO: Denies paresthesias, weakness  PSYCH: Denies depression, anxiety    VITALS:  T(F): 97.2, Max: 98.1 (04-13-21 @ 15:36)  HR: 66  BP: 114/56  RR: 20Vital Signs Last 24 Hrs  T(C): 36.2 (14 Apr 2021 07:50), Max: 36.7 (13 Apr 2021 15:36)  T(F): 97.2 (14 Apr 2021 07:50), Max: 98.1 (13 Apr 2021 15:36)  HR: 66 (14 Apr 2021 07:50) (66 - 75)  BP: 114/56 (14 Apr 2021 07:50) (106/59 - 114/56)  BP(mean): 76 (13 Apr 2021 15:36) (76 - 76)  RR: 20 (14 Apr 2021 07:50) (18 - 20)  SpO2: --    TESTS & MEASUREMENTS:                        12.4   11.28 )-----------( 300      ( 13 Apr 2021 07:38 )             37.9     04-13    136  |  99  |  24<H>  ----------------------------<  312<H>  4.3   |  24  |  1.5    Ca    8.7      13 Apr 2021 07:38  Mg     1.6     04-13    TPro  5.5<L>  /  Alb  3.0<L>  /  TBili  0.5  /  DBili  x   /  AST  62<H>  /  ALT  61<H>  /  AlkPhos  161<H>  04-13    LIVER FUNCTIONS - ( 13 Apr 2021 07:38 )  Alb: 3.0 g/dL / Pro: 5.5 g/dL / ALK PHOS: 161 U/L / ALT: 61 U/L / AST: 62 U/L / GGT: x             Culture - Acid Fast - Other w/Smear (collected 04-12-21 @ 17:05)  Source: .Other pubis    Culture - Other (collected 04-10-21 @ 14:00)  Source: .Other pubis wound  Final Report (04-12-21 @ 12:48):    Few Liss albicans "Susceptibilities not performed"    Culture - Surgical Swab (collected 04-09-21 @ 17:30)  Source: .Surgical Swab None  Preliminary Report (04-11-21 @ 11:34):    Few Liss albicans "Susceptibilities not performed"    Culture - Surgical Swab (collected 04-09-21 @ 17:30)  Source: .Surgical Swab None  Preliminary Report (04-11-21 @ 11:35):    Numerous Liss albicans "Susceptibilities not performed"    Culture - Fungal, Tissue (collected 04-08-21 @ 17:14)  Source: .Tissue PUBIS  Preliminary Report (04-12-21 @ 12:46):    Moderate Candida albicans    Culture - Tissue with Gram Stain (collected 04-08-21 @ 17:14)  Source: .Tissue PUBIS  Gram Stain (04-09-21 @ 20:52):    Upon re-evaluation of gram stain:    Few polymorphonuclear leukocytes seen per low power field    Numerous Gram Variable Rods seen per oil power field    Rare Yeast like cells seen per oil power field  Final Report (04-13-21 @ 19:03):    Moderate Liss albicans "Susceptibilities not performed"    Moderate Lactobacillus species "Susceptibilities not performed"    Culture - Fungal, Tissue (collected 04-08-21 @ 17:13)  Source: .Tissue PUBIS  Preliminary Report (04-12-21 @ 12:45):    Numerous Candida albicans    Culture - Tissue with Gram Stain (collected 04-08-21 @ 17:13)  Source: .Tissue PUBIS  Gram Stain (04-10-21 @ 18:54):    Upon re-evaluation of gram stain:    Numerous polymorphonuclear leukocytes seen per low power field    Numerous Gram Variable Rods seen per oil power field    Rare Gram positive cocci in pairs seen per oil power field    Moderate Yeast like cells seen per oil power field  Final Report (04-13-21 @ 19:18):    Moderate Enterococcus faecalis    Moderate Staphylococcus epidermidis    Moderate Liss albicans "Susceptibilities not performed"    Numerous Lactobacillus species "Susceptibilities not performed"  Organism: Enterococcus faecalis  Staphylococcus epidermidis (04-13-21 @ 19:18)  Organism: Staphylococcus epidermidis (04-13-21 @ 19:18)      -  Ampicillin/Sulbactam: S <=8/4      -  Cefazolin: S <=4      -  Clindamycin: S <=0.25      -  Erythromycin: R >4      -  Gentamicin: S <=1 Should not be used as monotherapy      -  Oxacillin: S <=0.25      -  Penicillin: R 0.25      -  RIF- Rifampin: S <=1 Should not be used as monotherapy      -  Tetra/Doxy: S <=1      -  Trimethoprim/Sulfamethoxazole: S <=0.5/9.5      -  Vancomycin: S 1      Method Type: ISIAH  Organism: Enterococcus faecalis (04-13-21 @ 19:18)      -  Ampicillin: S <=2 Predicts results to ampicillin/sulbactam, amoxacillin-clavulanate and  piperacillin-tazobactam.      -  Tetra/Doxy: R >8      -  Vancomycin: S 2      Method Type: ISIAH    Culture - Abscess with Gram Stain (collected 04-07-21 @ 14:07)  Source: .Abscess suprapubic region  Final Report (04-10-21 @ 19:43):    Culture yields >4 types of aerobic and/or anaerobic bacteria    Call client services within 7 days if further workup is clinically    indicated.    Culture includes    Few Staphylococcus aureus  Organism: Staphylococcus aureus (04-10-21 @ 19:43)  Organism: Staphylococcus aureus (04-10-21 @ 19:43)      -  Ampicillin/Sulbactam: S <=8/4      -  Cefazolin: S <=4      -  Clindamycin: S 0.5      -  Erythromycin: S <=0.25      -  Gentamicin: S <=1 Should not be used as monotherapy      -  Oxacillin: S <=0.25      -  Penicillin: R 8      -  RIF- Rifampin: S <=1 Should not be used as monotherapy      -  Tetra/Doxy: S <=1      -  Trimethoprim/Sulfamethoxazole: S <=0.5/9.5      -  Vancomycin: S 1      Method Type: ISIAH    Culture - Blood (collected 04-07-21 @ 12:08)  Source: .Blood Blood  Final Report (04-12-21 @ 22:00):    No Growth Final    Culture - Blood (collected 04-07-21 @ 12:08)  Source: .Blood Blood  Final Report (04-12-21 @ 22:00):    No Growth Final            RADIOLOGY & ADDITIONAL TESTS:  Personal review of radiological diagnostics performed  Echo and EKG results noted when applicable.     MEDICATIONS:  ampicillin/sulbactam  IVPB 3 Gram(s) IV Intermittent every 6 hours  atorvastatin 40 milliGRAM(s) Oral at bedtime  calcium carbonate    500 mG (Tums) Chewable 1 Tablet(s) Chew at bedtime  chlorhexidine 4% Liquid 1 Application(s) Topical <User Schedule>  dextrose 40% Gel 15 Gram(s) Oral once  dextrose 40% Gel 15 Gram(s) Oral once  dextrose 5%. 1000 milliLiter(s) IV Continuous <Continuous>  dextrose 5%. 1000 milliLiter(s) IV Continuous <Continuous>  dextrose 50% Injectable 25 Gram(s) IV Push once  dextrose 50% Injectable 12.5 Gram(s) IV Push once  dextrose 50% Injectable 25 Gram(s) IV Push once  dextrose 50% Injectable 25 Gram(s) IV Push once  dextrose 50% Injectable 12.5 Gram(s) IV Push once  dextrose 50% Injectable 25 Gram(s) IV Push once  glucagon  Injectable 1 milliGRAM(s) IntraMuscular once  glucagon  Injectable 1 milliGRAM(s) IntraMuscular once  heparin   Injectable 5000 Unit(s) SubCutaneous every 8 hours  insulin glargine Injectable (LANTUS) 55 Unit(s) SubCutaneous at bedtime  insulin lispro (ADMELOG) corrective regimen sliding scale   SubCutaneous three times a day before meals  insulin lispro (ADMELOG) corrective regimen sliding scale   SubCutaneous three times a day before meals  insulin lispro Injectable (ADMELOG) 25 Unit(s) SubCutaneous once  lactulose Syrup 10 Gram(s) Oral every 6 hours PRN  melatonin 5 milliGRAM(s) Oral at bedtime  ondansetron Injectable 4 milliGRAM(s) IV Push once PRN  ondansetron Injectable 4 milliGRAM(s) IV Push once PRN      ANTIBIOTICS:  ampicillin/sulbactam  IVPB 3 Gram(s) IV Intermittent every 6 hours

## 2021-04-14 NOTE — DISCHARGE NOTE PROVIDER - NSDCMRMEDTOKEN_GEN_ALL_CORE_FT
amoxicillin-clavulanate 875 mg-125 mg oral tablet: 1 tab(s) orally every 12 hours for 8 days  atorvastatin 40 mg oral tablet: 1 tab(s) orally once a day  insulin glargine: 70 unit(s) subcutaneous once a day (at bedtime)  insulin lispro 100 units/mL injectable solution: 30 unit(s) subcutaneous 3 times a day (before meals)  insulin lispro 100 units/mL injectable solution: 2 unit(s) subcutaneous once if -200  4 units if -250  6 units if FS fs 251-300  8 units if -350  10 units if -400  12 units if FS &gt;400   lisinopril 20 mg oral tablet: 1 tab(s) orally once a day  pioglitazone 30 mg oral tablet: 1 tab(s) orally once a day  sertraline 50 mg oral tablet: 1 tab(s) orally once a day   amoxicillin-clavulanate 875 mg-125 mg oral tablet: 1 tab(s) orally every 12 hours for 8 days  atorvastatin 40 mg oral tablet: 1 tab(s) orally once a day  insulin glargine: 70 unit(s) subcutaneous once a day (at bedtime)  insulin lispro 100 units/mL injectable solution: 30 unit(s) subcutaneous 3 times a day (before meals)  insulin lispro 100 units/mL injectable solution: 2 unit(s) subcutaneous once if -200  4 units if -250  6 units if FS fs 251-300  8 units if -350  10 units if -400  12 units if FS &gt;400   pioglitazone 30 mg oral tablet: 1 tab(s) orally once a day  sertraline 50 mg oral tablet: 1 tab(s) orally once a day   amoxicillin-clavulanate 875 mg-125 mg oral tablet: 1 tab(s) orally every 12 hours for 8 days  atorvastatin 40 mg oral tablet: 1 tab(s) orally once a day  insulin glargine: 70 unit(s) subcutaneous once a day (at bedtime)  insulin lispro 100 units/mL injectable solution: 30 unit(s) subcutaneous 3 times a day (before meals)  insulin lispro 100 units/mL injectable solution: 2 unit(s) subcutaneous once if -200  4 units if -250  6 units if FS fs 251-300  8 units if -350  10 units if -400  12 units if FS &gt;400   sertraline 50 mg oral tablet: 1 tab(s) orally once a day

## 2021-04-14 NOTE — DISCHARGE NOTE NURSING/CASE MANAGEMENT/SOCIAL WORK - PATIENT PORTAL LINK FT
You can access the FollowMyHealth Patient Portal offered by [facility] by registering at the following website: [email or web address]. By joining [facility]’s FollowMyHealth portal, you will also be able to view your health information using other applications (apps) compatible with our system.

## 2021-04-14 NOTE — PROGRESS NOTE ADULT - SUBJECTIVE AND OBJECTIVE BOX
Patient is a 60y old  Male who presents with a chief complaint of DKA (12 Apr 2021 06:35)    Patient was seen and examined.  Pt awaiting IPP bed  Pt on 1:1 for monitoring for high suicidal risk  Denies any new complaints.  All systems reviewed positive history as mentioned above.    PAST MEDICAL & SURGICAL HISTORY:  Diabetes mellitus  Dyslipidemia  Hypertension    No significant past surgical history    No Known Allergies      Home Medications:  atorvastatin 40 mg oral tablet: 1 tab(s) orally once a day (07 Apr 2021 19:05)  gabapentin 600 mg oral tablet: 1 tab(s) orally 3 times a day (07 Apr 2021 19:05)  Jardiance 10 mg oral tablet: 1 tab(s) orally once a day (in the morning) (07 Apr 2021 19:05)  lisinopril 20 mg oral tablet: 1 tab(s) orally once a day (07 Apr 2021 19:05)  pioglitazone 30 mg oral tablet: 1 tab(s) orally once a day (07 Apr 2021 19:05)  Victoza 18 mg/3 mL subcutaneous solution:  (07 Apr 2021 19:05)    MEDICATIONS  (STANDING):  amoxicillin  875 milliGRAM(s)/clavulanate 1 Tablet(s) Oral every 12 hours  atorvastatin 40 milliGRAM(s) Oral at bedtime  calcium carbonate    500 mG (Tums) Chewable 1 Tablet(s) Chew at bedtime  heparin   Injectable 5000 Unit(s) SubCutaneous every 8 hours  insulin glargine Injectable (LANTUS) 70 Unit(s) SubCutaneous at bedtime  insulin lispro (ADMELOG) corrective regimen sliding scale   SubCutaneous three times a day before meals  insulin lispro (ADMELOG) corrective regimen sliding scale   SubCutaneous three times a day before meals  insulin lispro Injectable (ADMELOG) 25 Unit(s) SubCutaneous once  melatonin 5 milliGRAM(s) Oral at bedtime  sertraline 50 milliGRAM(s) Oral daily    MEDICATIONS  (PRN):  lactulose Syrup 10 Gram(s) Oral every 6 hours PRN until BM  ondansetron Injectable 4 milliGRAM(s) IV Push once PRN Nausea and/or Vomiting  ondansetron Injectable 4 milliGRAM(s) IV Push once PRN Nausea and/or Vomiting    T(C): 36.2 (04-14-21 @ 07:50), Max: 36.7 (04-13-21 @ 15:36)  HR: 66 (04-14-21 @ 07:50) (66 - 75)  BP: 114/56 (04-14-21 @ 07:50) (106/59 - 114/56)  RR: 20 (04-14-21 @ 07:50) (18 - 20)  SpO2: --    O/E:  Awake, alert, not in distress.  HEENT: atraumatic, EOMI.  Chest: clear.  CVS: SIS2 +, no murmur.  P/A: obese, BS+, left groin dressing+  CNS: non focal.  Ext: no edema feet.  Skin: no rash, no ulcers.  All systems reviewed positive findings as above.                                  13.4   11.07 )-----------( 354      ( 14 Apr 2021 11:36 )             40.8   04-14    135  |  98  |  21<H>  ----------------------------<  356<H>  4.4   |  25  |  1.2    Ca    8.9      14 Apr 2021 11:36  Mg     1.8     04-14    TPro  5.5<L>  /  Alb  3.0<L>  /  TBili  0.5  /  DBili  x   /  AST  62<H>  /  ALT  61<H>  /  AlkPhos  161<H>  04-13

## 2021-04-15 ENCOUNTER — INPATIENT (INPATIENT)
Facility: HOSPITAL | Age: 61
LOS: 13 days | Discharge: NURSING, REHAB OR LONG-TERM CARE | End: 2021-04-29
Attending: PSYCHIATRY & NEUROLOGY | Admitting: PSYCHIATRY & NEUROLOGY
Payer: MEDICAID

## 2021-04-15 VITALS
SYSTOLIC BLOOD PRESSURE: 132 MMHG | TEMPERATURE: 98 F | WEIGHT: 255.96 LBS | RESPIRATION RATE: 18 BRPM | HEART RATE: 81 BPM | DIASTOLIC BLOOD PRESSURE: 85 MMHG | HEIGHT: 70 IN

## 2021-04-15 VITALS
RESPIRATION RATE: 18 BRPM | DIASTOLIC BLOOD PRESSURE: 61 MMHG | TEMPERATURE: 98 F | SYSTOLIC BLOOD PRESSURE: 108 MMHG | HEART RATE: 71 BPM

## 2021-04-15 DIAGNOSIS — F32.2 MAJOR DEPRESSIVE DISORDER, SINGLE EPISODE, SEVERE WITHOUT PSYCHOTIC FEATURES: ICD-10-CM

## 2021-04-15 DIAGNOSIS — E78.5 HYPERLIPIDEMIA, UNSPECIFIED: ICD-10-CM

## 2021-04-15 DIAGNOSIS — S31.109A UNSPECIFIED OPEN WOUND OF ABDOMINAL WALL, UNSPECIFIED QUADRANT WITHOUT PENETRATION INTO PERITONEAL CAVITY, INITIAL ENCOUNTER: ICD-10-CM

## 2021-04-15 DIAGNOSIS — F32.9 MAJOR DEPRESSIVE DISORDER, SINGLE EPISODE, UNSPECIFIED: ICD-10-CM

## 2021-04-15 DIAGNOSIS — I10 ESSENTIAL (PRIMARY) HYPERTENSION: ICD-10-CM

## 2021-04-15 DIAGNOSIS — E11.9 TYPE 2 DIABETES MELLITUS WITHOUT COMPLICATIONS: ICD-10-CM

## 2021-04-15 LAB
CULTURE RESULTS: SIGNIFICANT CHANGE UP
CULTURE RESULTS: SIGNIFICANT CHANGE UP
GLUCOSE BLDC GLUCOMTR-MCNC: 135 MG/DL — HIGH (ref 70–99)
GLUCOSE BLDC GLUCOMTR-MCNC: 154 MG/DL — HIGH (ref 70–99)
GLUCOSE BLDC GLUCOMTR-MCNC: 162 MG/DL — HIGH (ref 70–99)
SPECIMEN SOURCE: SIGNIFICANT CHANGE UP
SPECIMEN SOURCE: SIGNIFICANT CHANGE UP
SURGICAL PATHOLOGY STUDY: SIGNIFICANT CHANGE UP

## 2021-04-15 PROCEDURE — 99231 SBSQ HOSP IP/OBS SF/LOW 25: CPT

## 2021-04-15 PROCEDURE — 99239 HOSP IP/OBS DSCHRG MGMT >30: CPT

## 2021-04-15 RX ORDER — HYDROXYZINE HCL 10 MG
25 TABLET ORAL EVERY 6 HOURS
Refills: 0 | Status: DISCONTINUED | OUTPATIENT
Start: 2021-04-15 | End: 2021-04-15

## 2021-04-15 RX ORDER — HYDROXYZINE HCL 10 MG
25 TABLET ORAL EVERY 6 HOURS
Refills: 0 | Status: DISCONTINUED | OUTPATIENT
Start: 2021-04-15 | End: 2021-04-29

## 2021-04-15 RX ORDER — INSULIN GLARGINE 100 [IU]/ML
70 INJECTION, SOLUTION SUBCUTANEOUS AT BEDTIME
Refills: 0 | Status: DISCONTINUED | OUTPATIENT
Start: 2021-04-15 | End: 2021-04-22

## 2021-04-15 RX ORDER — ATORVASTATIN CALCIUM 80 MG/1
40 TABLET, FILM COATED ORAL AT BEDTIME
Refills: 0 | Status: DISCONTINUED | OUTPATIENT
Start: 2021-04-15 | End: 2021-04-29

## 2021-04-15 RX ORDER — DEXTROSE 50 % IN WATER 50 %
25 SYRINGE (ML) INTRAVENOUS ONCE
Refills: 0 | Status: DISCONTINUED | OUTPATIENT
Start: 2021-04-15 | End: 2021-04-29

## 2021-04-15 RX ORDER — ACETAMINOPHEN 500 MG
650 TABLET ORAL ONCE
Refills: 0 | Status: COMPLETED | OUTPATIENT
Start: 2021-04-15 | End: 2021-04-15

## 2021-04-15 RX ORDER — DEXTROSE 50 % IN WATER 50 %
15 SYRINGE (ML) INTRAVENOUS ONCE
Refills: 0 | Status: DISCONTINUED | OUTPATIENT
Start: 2021-04-15 | End: 2021-04-29

## 2021-04-15 RX ORDER — INSULIN LISPRO 100/ML
VIAL (ML) SUBCUTANEOUS AT BEDTIME
Refills: 0 | Status: DISCONTINUED | OUTPATIENT
Start: 2021-04-15 | End: 2021-04-29

## 2021-04-15 RX ORDER — DEXTROSE 50 % IN WATER 50 %
12.5 SYRINGE (ML) INTRAVENOUS ONCE
Refills: 0 | Status: DISCONTINUED | OUTPATIENT
Start: 2021-04-15 | End: 2021-04-29

## 2021-04-15 RX ORDER — SERTRALINE 25 MG/1
50 TABLET, FILM COATED ORAL DAILY
Refills: 0 | Status: DISCONTINUED | OUTPATIENT
Start: 2021-04-15 | End: 2021-04-19

## 2021-04-15 RX ORDER — ZOLPIDEM TARTRATE 10 MG/1
5 TABLET ORAL AT BEDTIME
Refills: 0 | Status: DISCONTINUED | OUTPATIENT
Start: 2021-04-15 | End: 2021-04-15

## 2021-04-15 RX ORDER — INSULIN LISPRO 100/ML
2 VIAL (ML) SUBCUTANEOUS ONCE
Refills: 0 | Status: DISCONTINUED | OUTPATIENT
Start: 2021-04-15 | End: 2021-04-15

## 2021-04-15 RX ORDER — PIOGLITAZONE HYDROCHLORIDE 15 MG/1
1 TABLET ORAL
Qty: 0 | Refills: 0 | DISCHARGE

## 2021-04-15 RX ORDER — GLUCAGON INJECTION, SOLUTION 0.5 MG/.1ML
1 INJECTION, SOLUTION SUBCUTANEOUS ONCE
Refills: 0 | Status: DISCONTINUED | OUTPATIENT
Start: 2021-04-15 | End: 2021-04-29

## 2021-04-15 RX ORDER — SODIUM CHLORIDE 9 MG/ML
1000 INJECTION, SOLUTION INTRAVENOUS
Refills: 0 | Status: DISCONTINUED | OUTPATIENT
Start: 2021-04-15 | End: 2021-04-29

## 2021-04-15 RX ORDER — INSULIN LISPRO 100/ML
30 VIAL (ML) SUBCUTANEOUS
Refills: 0 | Status: DISCONTINUED | OUTPATIENT
Start: 2021-04-15 | End: 2021-04-22

## 2021-04-15 RX ADMIN — ZOLPIDEM TARTRATE 5 MILLIGRAM(S): 10 TABLET ORAL at 00:14

## 2021-04-15 RX ADMIN — Medication 30 UNIT(S): at 17:39

## 2021-04-15 RX ADMIN — Medication 25 MILLIGRAM(S): at 23:46

## 2021-04-15 RX ADMIN — HEPARIN SODIUM 5000 UNIT(S): 5000 INJECTION INTRAVENOUS; SUBCUTANEOUS at 15:10

## 2021-04-15 RX ADMIN — Medication 1 TABLET(S): at 05:37

## 2021-04-15 RX ADMIN — Medication 1 TABLET(S): at 17:40

## 2021-04-15 RX ADMIN — Medication 30 UNIT(S): at 11:51

## 2021-04-15 RX ADMIN — HEPARIN SODIUM 5000 UNIT(S): 5000 INJECTION INTRAVENOUS; SUBCUTANEOUS at 05:37

## 2021-04-15 RX ADMIN — CHLORHEXIDINE GLUCONATE 1 APPLICATION(S): 213 SOLUTION TOPICAL at 05:37

## 2021-04-15 RX ADMIN — Medication 650 MILLIGRAM(S): at 11:50

## 2021-04-15 RX ADMIN — SERTRALINE 50 MILLIGRAM(S): 25 TABLET, FILM COATED ORAL at 11:50

## 2021-04-15 RX ADMIN — Medication 2: at 08:17

## 2021-04-15 RX ADMIN — Medication 30 UNIT(S): at 08:18

## 2021-04-15 RX ADMIN — Medication 2: at 17:38

## 2021-04-15 NOTE — PROGRESS NOTE BEHAVIORAL HEALTH - NSBHCONSULTRECOMMENDOTHER_PSY_A_CORE FT
Discharge Medications from medical Floor :  - amoxicillin-clavulanate 875 mg-125 mg oral tablet: 1 tab(s) orally every 12 hours for 8 days  - atorvastatin 40 mg oral tablet: 1 tab(s) orally once a day  - insulin glargine: 70 unit(s) subcutaneous once a day (at bedtime)  - insulin lispro 100 units/mL injectable solution: 30 unit(s) subcutaneous 3 times a day (before meals)  - insulin lispro 100 units/mL injectable solution: 2 unit(s) subcutaneous once if -200  4 units if -250  6 units if FS fs 251-300  8 units if -350  10 units if -400  12 units if FS &gt;400   - Wound care - Dry ABD over incision , secure with tape  ,

## 2021-04-15 NOTE — H&P ADULT - HISTORY OF PRESENT ILLNESS
60 year old man with no history of a psychiatric illness who was admitted to the medical floor following a fall, found to be in Diabetic Keto acidosis and also for the management of sepsis and groin abscess. Psychiatry consult was called for suicidal ideations as patient reportedly consumed a lot of sugar with the plan to become comatose and die. Patient continues to be depressed , hopeless and helpless .

## 2021-04-15 NOTE — PROGRESS NOTE ADULT - TIME-BASED BILLING (NON-CRITICAL CARE)
Time-based billing (NON-critical care)

## 2021-04-15 NOTE — PROGRESS NOTE BEHAVIORAL HEALTH - NSBHFUPINTERVALCCFT_PSY_A_CORE
"I feel good"
"each day is better"
" I need Physical therapy"
" I don't want to go there , I was just depressed "
"fine"

## 2021-04-15 NOTE — CHART NOTE - NSCHARTNOTESELECT_GEN_ALL_CORE
Last seen 2-19-21. Received refill request. Last appt date verified. Medication verified. Refill was given per protocol.    
PACU Admission/Event Note
Discharge
PACU admission note
Transfer Note

## 2021-04-15 NOTE — PROGRESS NOTE BEHAVIORAL HEALTH - NSBHCONSULTMEDANXIETY_PSY_A_CORE FT
Recommend Atarax 50mg p.o Q 6 hours PRN for anxiety and insomnia
Continue Atarax 50mg P.O Q 6 hrs PRN for anxiety and insomania

## 2021-04-15 NOTE — PROGRESS NOTE ADULT - ATTENDING COMMENTS
60 yr old male presented with DKA.    # DKA/ DM: secondary to most likely groin cellulitis--seen by Burn-- hydradenitis--- no drainage-- wound closure this week --dressing changed--on cefepime, linezolid and flagyl and fluconazole  has yeast like cells and few gm positive cocci in wound Cx  ID follow up    # DKA-- blood sugar high-- lantus made 50 and 20units of lispro TID  hba1c is 15.5    # hyponatremia and VALDEZ resolved.    # morbid obesity  will need Burn followup for closure this week.
Pt to return to OR today  developed anion gap last night now closed  labs ok  definitely unit candidate
stable overnight   no progression of cellulitis   continue medical management   recall as needed
dressing change --> viable tissue pubis--> local wound care--> wound closure next week
large dressing change--> healing pubis and groin wounds--> local wound care
large dressing change--.> pubic wund edges well approx--> local wound care
large dressing change--> healing pubic wound--> drain d/c --> .local

## 2021-04-15 NOTE — PROGRESS NOTE BEHAVIORAL HEALTH - AXIS III
uncontrolled DM2, diabetic retinopathy and neuropathy, HTN, morbid obesity, CKD 3

## 2021-04-15 NOTE — PROGRESS NOTE BEHAVIORAL HEALTH - NSBHATTESTSEENBY_PSY_A_CORE
attending Psychiatrist without NP/Trainee
Attending Psychiatrist supervising NP/Trainee, meeting pt...
Trainee with telephonic supervision from Attending Psychiatrist
attending Psychiatrist without NP/Trainee
Attending Psychiatrist supervising NP/Trainee, meeting pt...

## 2021-04-15 NOTE — PROGRESS NOTE ADULT - SUBJECTIVE AND OBJECTIVE BOX
Patient is a 60y old  Male who presents with a chief complaint of DKA. pt is POD#3 s/p debridement of pubis with primary closure and FELIPE drain placement.       INTERVAL HPI/OVERNIGHT EVENTS:  ICU Vital Signs Last 24 Hrs  T(C): 36.7 (15 Apr 2021 15:48), Max: 36.7 (15 Apr 2021 15:48)  T(F): 98.1 (15 Apr 2021 15:48), Max: 98.1 (15 Apr 2021 15:48)  HR: 71 (15 Apr 2021 15:48) (71 - 72)  BP: 108/61 (15 Apr 2021 15:48) (105/55 - 116/68)  RR: 18 (15 Apr 2021 15:48) (18 - 24)  SpO2: 95% (14 Apr 2021 16:39) (95% - 95%)    MEDICATIONS  (STANDING):  amoxicillin  875 milliGRAM(s)/clavulanate 1 Tablet(s) Oral every 12 hours  atorvastatin 40 milliGRAM(s) Oral at bedtime  calcium carbonate    500 mG (Tums) Chewable 1 Tablet(s) Chew at bedtime  chlorhexidine 4% Liquid 1 Application(s) Topical <User Schedule>  dextrose 40% Gel 15 Gram(s) Oral once  dextrose 40% Gel 15 Gram(s) Oral once  dextrose 5%. 1000 milliLiter(s) (100 mL/Hr) IV Continuous <Continuous>  dextrose 5%. 1000 milliLiter(s) (50 mL/Hr) IV Continuous <Continuous>  dextrose 50% Injectable 25 Gram(s) IV Push once  dextrose 50% Injectable 12.5 Gram(s) IV Push once  dextrose 50% Injectable 25 Gram(s) IV Push once  dextrose 50% Injectable 25 Gram(s) IV Push once  dextrose 50% Injectable 12.5 Gram(s) IV Push once  dextrose 50% Injectable 25 Gram(s) IV Push once  glucagon  Injectable 1 milliGRAM(s) IntraMuscular once  glucagon  Injectable 1 milliGRAM(s) IntraMuscular once  heparin   Injectable 5000 Unit(s) SubCutaneous every 8 hours  insulin glargine Injectable (LANTUS) 70 Unit(s) SubCutaneous at bedtime  insulin lispro (ADMELOG) corrective regimen sliding scale   SubCutaneous three times a day before meals  insulin lispro Injectable (ADMELOG) 30 Unit(s) SubCutaneous three times a day before meals  melatonin 5 milliGRAM(s) Oral at bedtime  sertraline 50 milliGRAM(s) Oral daily        PHYSICAL EXAM:  GENERAL: well built, well nourished  HEAD:  Atraumatic, Normocephalic  Wound: Incision site to pubic wound edge adherent. wound closed. sutures and staples in place. no signs of purulent drainage, edema or dehiscence noted. FELIPE drain removed 4/14.

## 2021-04-15 NOTE — H&P ADULT - NSHPLABSRESULTS_GEN_ALL_CORE
13.4   11.07 )-----------( 354      ( 14 Apr 2021 11:36 )             40.8       04-14    135  |  98  |  21<H>  ----------------------------<  356<H>  4.4   |  25  |  1.2    Ca    8.9      14 Apr 2021 11:36  Mg     1.8     04-14                        Lactate Trend            CAPILLARY BLOOD GLUCOSE      POCT Blood Glucose.: 154 mg/dL (15 Apr 2021 16:37)        Culture Results:   Moderate Candida albicans "Susceptibilities not performed" (04-12 @ 17:05)  Culture Results:   Culture is being performed. (04-12 @ 17:05)  Culture Results:   Few Candida albicans "Susceptibilities not performed" (04-10 @ 14:00)  Culture Results:   Numerous Liss albicans "Susceptibilities not performed" (04-09 @ 17:30)  Culture Results:   Few Candida albicans "Susceptibilities not performed" (04-09 @ 17:30)  Culture Results:   Moderate Candida albicans "Susceptibilities not performed"  Moderate Lactobacillus species "Susceptibilities not performed" (04-08 @ 17:14)  Culture Results:   Moderate Candida albicans (04-08 @ 17:14)  Culture Results:   Moderate Enterococcus faecalis  Moderate Staphylococcus epidermidis  Moderate Liss albicans "Susceptibilities not performed"  Numerous Lactobacillus species "Susceptibilities not performed" (04-08 @ 17:13)  Culture Results:   Numerous Candida albicans (04-08 @ 17:13)  Culture Results:   Culture yields >4 types of aerobic and/or anaerobic bacteria  Call client services within 7 days if further workup is clinically  indicated.  Culture includes  Few Staphylococcus aureus (04-07 @ 14:07)  Culture Results:   No Growth Final (04-07 @ 12:08)  Culture Results:   No Growth Final (04-07 @ 12:08)

## 2021-04-15 NOTE — PROGRESS NOTE ADULT - TIME BILLING
uncontrolled type 2 DM , non compliance   DKA   discussed with team
Discussed on Rounds with Housestaff.
uncontrolled type 2 DM /DKA  discussed with medical team and RN
wound care
uncontrolled type 2 DM
wound care
wound care
Discussed on Rounds with Housestaff
Discussed on Rounds with Housestaff.
wound care
wound care
Counseled patient about diagnostic testing and treatment plan. All questions answered.
Counseled patient about diagnostic testing and treatment plan. All questions answered.

## 2021-04-15 NOTE — PROGRESS NOTE ADULT - PROVIDER SPECIALTY LIST ADULT
Critical Care
Endocrinology
Internal Medicine
Burn
Critical Care
Endocrinology
Surgery
Endocrinology
Endocrinology
Internal Medicine
Pulmonology
Pulmonology
Anesthesia
Burn
Hospitalist
Burn
Internal Medicine
Internal Medicine
Pulmonology
Endocrinology
Hospitalist
Infectious Disease
Burn
Hospitalist
Infectious Disease
Infectious Disease

## 2021-04-15 NOTE — PROGRESS NOTE ADULT - ASSESSMENT
59 y/o male with pubic abscess.  pt is POD#3 s/p debridement of pubis with primary closure and FELIPE drain placement.  -FELIPE drain removed at bedside4/14.   -Okay to DC from burn perspective; F/u in clinic within 1 week of d/c  -Continue wound care/ dressing changes -dry ABD over incision site, secure with tape  - Wound cx results- 04/08; 04/09; 04/10 - Candida Albicans  -Unasyn 3 gm iv q6h - per ID can change to PO abx now that wound is closed  - Medical management per Primary team

## 2021-04-15 NOTE — PROGRESS NOTE ADULT - REASON FOR ADMISSION
DKA

## 2021-04-15 NOTE — PROGRESS NOTE BEHAVIORAL HEALTH - SUMMARY
Patient is a 60 year old  male,  from wife and children, on SSI, lives alone in house basement, pmh uncontrolled DM2, diabetic retinopathy and neuropathy, HTN, morbid obesity, and CKD 3 admitted to hospital for DKA management, psych consulted for evaluation of depression.   Patient showing signs and symptoms of depression with active suicidal ideation. This is in the context of social stressors including but not limited to perceived homelessness, isolation, and finances. Patient is at an acute heightened suicide risk, warranting continued psych monitoring and possible admission after medical clearance.     Recommendations:  - 1:1 monitoring for high suicide risk  - At this time, patient showing sxs of depression and stating active SI. Psych to follow for ongoing psych monitoring/evaluation - risk assessment and safety planning  - While pt has signs/sxs of depression, cannot rule out medical causes of depression, will hold off on psychopharm at this time until medical work up complete  - Social work/case management involvement to determine if patient will benefit from home health aid/begin process to obtain HHA for management of diabetes, kidney disease, etc.  - PT/OT when appropriate per primary team
Mr De La Cruz is a 60 year old man with no history of a psychiatric illness who was admitted to the medical floor following a fall, found to be in Diabetic Keto acidosis and also for the management of sepsis and groin abscess. Psychiatry consult was called for suicidal ideations as patient reportedly consumed a lot of sugar with the plan to become comatose and die. Patient continues to be depressed , hopeless and helpless . He seems to be minimizing his symptoms indication that his depression will be resolved once his social stressors are addressed. he denies current suicidal ideations , intent or plan. Patient seems to have poor frustration tolerance , poor impulse control , appears to have been intentionally isolated from family and not function at his reported baseline .   At this time, patient continues to be considered a danger to himself  and would benefit from inpatient psychiatric hospitalization upon medical clearance and bed availability. Patient was informed of the benefits of antidepressants and offered him Zoloft at 50mg p.o daily. patient was informed about the possible side effect of this medication and then agreed to take the medication. Patient will be transferred to the inpatient psychiatric floor upon medical clearance and bed availability.
Patient is a 60 year old  male,  from wife and children, on SSI, lives alone in house basement, pmh uncontrolled DM2, diabetic retinopathy and neuropathy, HTN, morbid obesity, and CKD 3 admitted to hospital for DKA management, psych consulted for evaluation of depression.   Patient showing signs and symptoms of depression with active suicidal ideation. This is in the context of social stressors including but not limited to perceived homelessness, isolation, and finances. Patient is at an acute heightened suicide risk, warranting continued psych monitoring and admission to inpatient psychiatry after medical clearance.     Recommendations:  - 1:1 monitoring for high suicide risk  - At this time, patient showing sxs of depression and stating active SI. Psych to follow for ongoing psych monitoring/evaluation - pt to be admitted to inpatient psychiatry after medical clearance    - While pt has signs/sxs of depression, cannot rule out medical causes of depression, will hold off on psychopharm at this time until medical work up and treatment completed  - Social work/case management involvement to determine if patient will benefit from home health aid/begin process to obtain HHA for management of diabetes, kidney disease, etc.  - PT/OT when appropriate per primary team.
Mr De La Cruz is a 60 year old man with no history of a psychiatric illness who was admitted to the medical floor following a fall, found to be in Diabetic Keto acidosis and also for the management of sepsis and groin abscess. Psychiatry consult was called for suicidal ideations as patient reportedly consumed a lot of sugar with the plan to become comatose and die. Patient continues to be depressed , hopeless and helpless . He continue to minimize his symptoms reporting that his depressed mood is because of his social stressors . He continues to deny current suicidal ideations , intent or plan. However, patient seems to have poor frustration tolerance , poor impulse control , appears to have been intentionally isolated from family and not function at his reported baseline .   At this time, patient continues to be considered a danger to himself  and would benefit from inpatient psychiatric hospitalization upon medical clearance and bed availability. We recommend continuing Zoloft 50mg p.o daily with the plan to adjust accordingly.. Patient will be transferred to the inpatient psychiatric floor upon medical clearance and bed avalability.
Patient is a 60 year old  male,  from wife and children, on SSI, lives alone in house basement, pmh uncontrolled DM2, diabetic retinopathy and neuropathy, HTN, morbid obesity, and CKD 3 admitted to hospital for DKA management, psych consulted for evaluation of depression.   Patient showing signs and symptoms of depression with active suicidal ideation. This is in the context of social stressors including but not limited to perceived homelessness, isolation, and finances. Patient is at an acute heightened suicide risk, warranting continued psych monitoring and possible admission after medical clearance.     Recommendations:  - 1:1 monitoring for high suicide risk  - At this time, patient showing sxs of depression and stating active SI. Psych to follow for ongoing psych monitoring/evaluation - pt to be admitted to inpatient psychiatry after medical clearance    - While pt has signs/sxs of depression, cannot rule out medical causes of depression, will hold off on psychopharm at this time until medical work up and treatment completed  - Social work/case management involvement to determine if patient will benefit from home health aid/begin process to obtain HHA for management of diabetes, kidney disease, etc.  - PT/OT when appropriate per primary team.

## 2021-04-15 NOTE — PROGRESS NOTE ADULT - NSICDXPILOT_GEN_ALL_CORE
[place]

## 2021-04-15 NOTE — PROGRESS NOTE BEHAVIORAL HEALTH - PRIMARY DX
Current severe episode of major depressive disorder without psychotic features, unspecified whether recurrent

## 2021-04-15 NOTE — PROGRESS NOTE BEHAVIORAL HEALTH - NSBHCHARTREVIEWLAB_PSY_A_CORE FT
12.4   11.28 )-----------( 300      ( 13 Apr 2021 07:38 )             37.9   04-13    136  |  99  |  24<H>  ----------------------------<  312<H>  4.3   |  24  |  1.5    Ca    8.7      13 Apr 2021 07:38  Phos  3.1     04-12  Mg     1.6     04-13    TPro  5.5<L>  /  Alb  3.0<L>  /  TBili  0.5  /  DBili  x   /  AST  62<H>  /  ALT  61<H>  /  AlkPhos  161<H>  04-13
13.4   11.07 )-----------( 354      ( 14 Apr 2021 11:36 )             40.8       04-14    135  |  98  |  21<H>  ----------------------------<  356<H>  4.4   |  25  |  1.2    Ca    8.9      14 Apr 2021 11:36  Mg     1.8     04-14
11.4   7.62  )-----------( 254      ( 11 Apr 2021 07:30 )             34.5    04-11    139  |  106  |  16  ----------------------------<  238<H>  4.0   |  22  |  1.2    Ca    8.3<L>      11 Apr 2021 07:30  Phos  2.4     04-11  Mg     1.8     04-11    TPro  4.8<L>  /  Alb  2.6<L>  /  TBili  0.5  /  DBili  x   /  AST  15  /  ALT  14  /  AlkPhos  106  04-11
12.0   8.47  )-----------( 293      ( 12 Apr 2021 05:23 )             37.1     04-12    142  |  104  |  16  ----------------------------<  117<H>  5.1<H>   |  26  |  1.3    Ca    9.1      12 Apr 2021 05:23  Phos  3.1     04-12  Mg     1.8     04-12    TPro  5.1<L>  /  Alb  2.8<L>  /  TBili  0.5  /  DBili  x   /  AST  70<H>  /  ALT  40  /  AlkPhos  137<H>  04-12

## 2021-04-15 NOTE — H&P ADULT - NSHPPHYSICALEXAM_GEN_ALL_CORE
GENERAL:  61y/o Male NAD, resting comfortably.  HEAD:  Atraumatic, Normocephalic  EYES: EOMI, PERRLA, conjunctiva and sclera clear  NECK: Supple, No JVD, no cervical lymphadenopathy, non-tender  CHEST/LUNG: Clear to auscultation bilaterally; No wheeze, rhonchi, or rales  HEART: Regular rate and rhythm; S1&S2  ABDOMEN: Soft, Nontender, Nondistended x 4 quadrants; Bowel sounds present  EXTREMITIES:   Peripheral Pulses Present, No clubbing, no cyanosis, or no edema, no calf tenderness  PSYCH: AAOx3, cooperative, appropriate  NEUROLOGY: WNL  SKIN: WNL GENERAL:  61y/o Male NAD, resting comfortably.  HEAD:  Atraumatic, Normocephalic  EYES: EOMI, PERRLA, conjunctiva and sclera clear  NECK: Supple, No JVD, no cervical lymphadenopathy, non-tender  CHEST/LUNG: Clear to auscultation bilaterally; No wheeze, rhonchi, or rales  HEART: Regular rate and rhythm; S1&S2  ABDOMEN: Soft, Nontender  lower abdominal wound, Nondistended x 4 quadrants; Bowel sounds present  EXTREMITIES:   Peripheral Pulses Present, No clubbing, no cyanosis, or no edema, no calf tenderness  PSYCH: AAOx3, cooperative, appropriate  NEUROLOGY: WNL  SKIN: WNL

## 2021-04-15 NOTE — H&P ADULT - NSHPREVIEWOFSYSTEMS_GEN_ALL_CORE
REVIEW OF SYSTEMS:    CONSTITUTIONAL: No weakness, fevers or chills  EYES/ENT: No visual changes;  No vertigo or throat pain   NECK: No pain or stiffness  RESPIRATORY: No cough, wheezing, hemoptysis; No shortness of breath  CARDIOVASCULAR: No chest pain or palpitations  GASTROINTESTINAL: mild  abdominal   GENITOURINARY: No dysuria, frequency or hematuria  NEUROLOGICAL: No numbness or weakness  SKIN: No itching, rashes

## 2021-04-16 DIAGNOSIS — F43.21 ADJUSTMENT DISORDER WITH DEPRESSED MOOD: ICD-10-CM

## 2021-04-16 PROBLEM — E11.9 TYPE 2 DIABETES MELLITUS WITHOUT COMPLICATIONS: Chronic | Status: ACTIVE | Noted: 2021-04-07

## 2021-04-16 PROBLEM — I10 ESSENTIAL (PRIMARY) HYPERTENSION: Chronic | Status: ACTIVE | Noted: 2021-04-07

## 2021-04-16 PROBLEM — E78.5 HYPERLIPIDEMIA, UNSPECIFIED: Chronic | Status: ACTIVE | Noted: 2021-04-07

## 2021-04-16 LAB
GLUCOSE BLDC GLUCOMTR-MCNC: 138 MG/DL — HIGH (ref 70–99)
GLUCOSE BLDC GLUCOMTR-MCNC: 275 MG/DL — HIGH (ref 70–99)
GLUCOSE BLDC GLUCOMTR-MCNC: 297 MG/DL — HIGH (ref 70–99)
GLUCOSE BLDC GLUCOMTR-MCNC: 331 MG/DL — HIGH (ref 70–99)
SURGICAL PATHOLOGY STUDY: SIGNIFICANT CHANGE UP

## 2021-04-16 PROCEDURE — 99232 SBSQ HOSP IP/OBS MODERATE 35: CPT

## 2021-04-16 PROCEDURE — 99233 SBSQ HOSP IP/OBS HIGH 50: CPT

## 2021-04-16 RX ORDER — INSULIN LISPRO 100/ML
VIAL (ML) SUBCUTANEOUS
Refills: 0 | Status: DISCONTINUED | OUTPATIENT
Start: 2021-04-16 | End: 2021-04-16

## 2021-04-16 RX ORDER — HEPARIN SODIUM 5000 [USP'U]/ML
5000 INJECTION INTRAVENOUS; SUBCUTANEOUS EVERY 8 HOURS
Refills: 0 | Status: DISCONTINUED | OUTPATIENT
Start: 2021-04-16 | End: 2021-04-29

## 2021-04-16 RX ORDER — HYDROXYZINE HCL 10 MG
25 TABLET ORAL ONCE
Refills: 0 | Status: COMPLETED | OUTPATIENT
Start: 2021-04-16 | End: 2021-04-16

## 2021-04-16 RX ORDER — INSULIN LISPRO 100/ML
5 VIAL (ML) SUBCUTANEOUS ONCE
Refills: 0 | Status: COMPLETED | OUTPATIENT
Start: 2021-04-16 | End: 2021-04-16

## 2021-04-16 RX ORDER — TRAZODONE HCL 50 MG
50 TABLET ORAL ONCE
Refills: 0 | Status: COMPLETED | OUTPATIENT
Start: 2021-04-16 | End: 2021-04-16

## 2021-04-16 RX ORDER — INSULIN LISPRO 100/ML
VIAL (ML) SUBCUTANEOUS
Refills: 0 | Status: DISCONTINUED | OUTPATIENT
Start: 2021-04-16 | End: 2021-04-29

## 2021-04-16 RX ADMIN — Medication 30 UNIT(S): at 16:29

## 2021-04-16 RX ADMIN — HEPARIN SODIUM 5000 UNIT(S): 5000 INJECTION INTRAVENOUS; SUBCUTANEOUS at 20:05

## 2021-04-16 RX ADMIN — Medication 3: at 16:29

## 2021-04-16 RX ADMIN — INSULIN GLARGINE 70 UNIT(S): 100 INJECTION, SOLUTION SUBCUTANEOUS at 20:04

## 2021-04-16 RX ADMIN — Medication 50 MILLIGRAM(S): at 23:28

## 2021-04-16 RX ADMIN — Medication 5 UNIT(S): at 08:51

## 2021-04-16 RX ADMIN — Medication 30 UNIT(S): at 08:08

## 2021-04-16 RX ADMIN — Medication 30 UNIT(S): at 11:34

## 2021-04-16 RX ADMIN — Medication 1 TABLET(S): at 08:08

## 2021-04-16 RX ADMIN — Medication 25 MILLIGRAM(S): at 20:04

## 2021-04-16 RX ADMIN — Medication 1 TABLET(S): at 20:04

## 2021-04-16 RX ADMIN — Medication 3: at 11:34

## 2021-04-16 RX ADMIN — ATORVASTATIN CALCIUM 40 MILLIGRAM(S): 80 TABLET, FILM COATED ORAL at 20:04

## 2021-04-16 RX ADMIN — SERTRALINE 50 MILLIGRAM(S): 25 TABLET, FILM COATED ORAL at 08:09

## 2021-04-16 NOTE — BH SAFETY PLAN - THE ONE THING THAT IS MOST IMPORTANT TO ME AND WORTH LIVING FOR IS:
My kids, my family, and my grandchildren  The one thing that is most important to me and worth living for would be my kids, my family, and my grandchildren.

## 2021-04-16 NOTE — CONSULT NOTE ADULT - SUBJECTIVE AND OBJECTIVE BOX
THOMAS HARDIN  60y  Male      INTERVAL HPI/OVERNIGHT EVENTS:  Patient seen and examined earlier this morning.  Lying comfortably in bed.  Denies any complaints.        REVIEW OF SYSTEMS:  CONSTITUTIONAL: No fever, weight loss, or fatigue  EYES: No eye pain, visual disturbances, or discharge  ENMT:  No difficulty hearing, tinnitus, vertigo; No sinus or throat pain  NECK: No pain or stiffness  RESPIRATORY: No cough, wheezing, chills or hemoptysis; No shortness of breath  CARDIOVASCULAR: No chest pain, palpitations, dizziness, or leg swelling  GASTROINTESTINAL: No abdominal or epigastric pain. No nausea, vomiting, or hematemesis; No diarrhea or constipation. No melena or hematochezia.  GENITOURINARY: No dysuria, frequency, hematuria, or incontinence  NEUROLOGICAL: No headaches, memory loss, loss of strength, numbness, or tremors  SKIN: No itching, burning, rashes, or lesions   LYMPH NODES: No enlarged glands  ENDOCRINE: No heat or cold intolerance; No hair loss  MUSCULOSKELETAL: No joint pain or swelling; No muscle, back, or extremity pain  PSYCHIATRIC: No depression, anxiety, mood swings, or difficulty sleeping  HEME/LYMPH: No easy bruising, or bleeding gums  ALLERY AND IMMUNOLOGIC: No hives or eczema      T(C): 36.6 (04-16-21 @ 09:43), Max: 36.9 (04-15-21 @ 20:37)  HR: 91 (04-16-21 @ 09:43) (75 - 91)  BP: 103/65 (04-16-21 @ 09:43) (103/65 - 132/85)  RR: 18 (04-16-21 @ 09:43) (16 - 18)  SpO2: --      PHYSICAL EXAM:  GENERAL: NAD, well-groomed, well-developed  HEAD:  Atraumatic, Normocephalic  EYES: EOMI, PERRLA, conjunctiva and sclera clear  ENMT: No tonsillar erythema, exudates, or enlargement; Moist mucous membranes, Good dentition, No lesions  NECK: Supple, No JVD, Normal thyroid  NERVOUS SYSTEM:  Alert & Oriented X3, Good concentration; Motor Strength 5/5 B/L upper and lower extremities; DTRs 2+ intact and symmetric  CHEST/LUNG: Clear to percussion bilaterally; No rales, rhonchi, wheezing, or rubs  HEART: Regular rate and rhythm; No murmurs, rubs, or gallops  ABDOMEN: Soft, Nontender, Nondistended; Bowel sounds present  EXTREMITIES:  2+ Peripheral Pulses, No clubbing, cyanosis, or edema  LYMPH: No lymphadenopathy noted  SKIN: No rashes or lesions    Consultant(s) Notes Reviewed:  [x ] YES  [ ] NO  Care Discussed with Consultants/Other Providers [ x] YES  [ ] NO    LAB:      Complete Blood Count (04.14.21 @ 11:36)   Nucleated RBC: 0 /100 WBCs   WBC Count: 11.07 K/uL   RBC Count: 4.81 M/uL   Hemoglobin: 13.4 g/dL   Hematocrit: 40.8 %   Mean Cell Volume: 84.8 fL   Mean Cell Hemoglobin: 27.9 pg   Mean Cell Hemoglobin Conc: 32.8 g/dL   Red Cell Distrib Width: 12.9 %   Platelet Count - Automated: 354 K/uL     Drug Dosing Weight  Height (cm): 177.8 (15 Apr 2021 20:37)  Weight (kg): 116.12 (15 Apr 2021 20:37)  BMI (kg/m2): 36.7 (15 Apr 2021 20:37)  BSA (m2): 2.32 (15 Apr 2021 20:37)          CAPILLARY BLOOD GLUCOSE  POCT Blood Glucose.: 297 mg/dL (16 Apr 2021 11:28)  POCT Blood Glucose.: 331 mg/dL (16 Apr 2021 06:37)  POCT Blood Glucose.: 154 mg/dL (15 Apr 2021 16:37)            RADIOLOGY & ADDITIONAL TESTS:  Imaging Personally Reviewed:  [x] YES  [ ] NO    HEALTH ISSUES - PROBLEM Dx:  Depression  Depression    Hypertension  Hypertension    Dyslipidemia  Dyslipidemia    Diabetes mellitus  Diabetes mellitus    Wound of abdomen  Wound of abdomen    Adjustment disorder with depressed mood            MEDS:  amoxicillin  875 milliGRAM(s)/clavulanate 1 Tablet(s) Oral every 12 hours  atorvastatin 40 milliGRAM(s) Oral at bedtime  dextrose 40% Gel 15 Gram(s) Oral once  dextrose 5%. 1000 milliLiter(s) IV Continuous <Continuous>  dextrose 5%. 1000 milliLiter(s) IV Continuous <Continuous>  dextrose 50% Injectable 25 Gram(s) IV Push once  dextrose 50% Injectable 12.5 Gram(s) IV Push once  dextrose 50% Injectable 25 Gram(s) IV Push once  glucagon  Injectable 1 milliGRAM(s) IntraMuscular once  heparin   Injectable 5000 Unit(s) SubCutaneous every 8 hours  hydrOXYzine hydrochloride 25 milliGRAM(s) Oral once  hydrOXYzine hydrochloride 25 milliGRAM(s) Oral every 6 hours PRN  insulin glargine Injectable (LANTUS) 70 Unit(s) SubCutaneous at bedtime  insulin lispro (ADMELOG) corrective regimen sliding scale   SubCutaneous at bedtime  insulin lispro (ADMELOG) corrective regimen sliding scale   SubCutaneous three times a day before meals  insulin lispro Injectable (ADMELOG) 30 Unit(s) SubCutaneous three times a day before meals  sertraline 50 milliGRAM(s) Oral daily

## 2021-04-16 NOTE — BH SAFETY PLAN - STEP 6 SAFE ENVIRONMENT
One way I would make my environment would be to express what is going on in my head to my friends and family.

## 2021-04-16 NOTE — PHYSICAL THERAPY INITIAL EVALUATION ADULT - GAIT DEVIATIONS NOTED, PT EVAL
stooped posture , decreased heel strike / push off/decreased deandre/increased time in double stance/decreased step length/decreased weight-shifting ability

## 2021-04-16 NOTE — PHYSICAL THERAPY INITIAL EVALUATION ADULT - DISCHARGE DISPOSITION, PT EVAL
Airway patent, Nasal mucosa clear. Mouth with normal mucosa. Throat has no vesicles, no oropharyngeal exudates and uvula is midline. pending progress

## 2021-04-16 NOTE — BH SAFETY PLAN - WARNING SIGN 1
I will keep to myself.  One of my warning sign of my depression would be that I would self isolate myself.

## 2021-04-16 NOTE — BH SAFETY PLAN - WARNING SIGN 2
I go all by myself (isolate) Another warning sign of my depression would be that I will sleep all day.

## 2021-04-16 NOTE — CONSULT NOTE ADULT - ASSESSMENT
# Depression with active suicidal ideation   - management as per psych    # Left pubic abscess  -  CT Abdomen and Pelvis w/ IV Cont (04.07.21 @ 16:05) >Edema/inflammatory changes in the soft tissues of the anterior pelvis involving the left groin and pubic region. Small 2.1 cm fluid pocket along the skin surface. No soft tissue gas identified.  - blood Culture Results: No growth to date. (04.07.21 @ 12:08)  - wound Culture Results: Few Candida albicans "Susceptibilities not performed" (04.10.21 @ 14:00)  - s/p debridement on 4/8, 410, 4/12 debridement with primary closure  - Continue wound care/ dressing changes -dry ABD over incision site, secure with tape  - S/p unasyn po Augmentin 875 mg q12h for 8 more days      # DKA-resolved / DM II  - A1C with Estimated Average Glucose Result: >15.5: % (04.08.21 @ 04:53)  - continue current insulin dosage   - check fs qac and qhs      # CKD stage II -stable  - hold lisinopril    # Hypomagnesemia-resolved    # Hypertension- stable  - holding lisinopril    # Dyslipidemia  - c/w statin    # Obesity  - BMI: 36  - diet and lifestyle modification     Please call with any questions

## 2021-04-16 NOTE — BH SAFETY PLAN - INTERNAL COPING STRATEGY 1
I like to talk to my friends.  One way I would cope with my depression would be that I would watch what ever that is on TV, just to try an distract myself. But I usually like to watch sports, any sports to be honest.

## 2021-04-16 NOTE — PROGRESS NOTE BEHAVIORAL HEALTH - NSBHADDHXMEDFT_PSY_A_CORE
uncontrolled DM2, diabetic retinopathy and neuropathy, HTN, morbid obesity, and CKD 3 transferred from medicine as he was admitted to hospital for DKA management

## 2021-04-16 NOTE — PROGRESS NOTE BEHAVIORAL HEALTH - NSBHCHARTREVIEWINVESTIGATE_PSY_A_CORE FT
< from: 12 Lead ECG (04.08.21 @ 11:59) >    QTC Calculation(Bazett) 499 ms    < end of copied text >    < from: 12 Lead ECG (04.08.21 @ 11:59) >      Diagnosis Line Normal sinus rhythm  Normal ECG    < end of copied text >

## 2021-04-16 NOTE — BH SAFETY PLAN - LOCAL URGENT CARE ADDRESS
[facility] is located at [address] [place] [address] (inside the [facility]).

## 2021-04-16 NOTE — PROGRESS NOTE BEHAVIORAL HEALTH - SUMMARY
60 year old  male, , non-caregiver, 3 adult children, on SSI previously worked in construction, previously renting room in house now currently homeless,, pmh uncontrolled DM2, diabetic retinopathy and neuropathy, HTN, morbid obesity, and CKD 3 admitted to hospital for DKA management, psych consulted for evaluation of depression. Neyda admitted under 9.27 for depression with active   suicidal ideation in the context of social stressors including but not limited to perceived homelessness, isolation, and finances.     Upon evaluation today pt adamantly denies suicidal ideations, intent or plan. He endorses depressed mood but in the context of financial struggles. Patient denies history of suicidality and reports last SI 2 year ago. He identifies reasons for living (children) despite being estranged from children. Collateral obtained from cousin does not express any concerns for suicidality, just requesting placement for physical therapy. Cousin does not feel patient can address his medical needs on his own.     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 Patient admitted under 9.27 for depression with active   suicidal ideation in the context of social stressors including but not limited to perceived homelessness, isolation, and finances.     Upon evaluation today pt adamantly denies suicidal ideations, intent or plan. He endorses depressed mood but in the context of financial struggles. Patient denies history of suicidality and reports last SI 2 year ago. He identifies reasons for living (children) despite being estranged from children. Collateral obtained from cousin does not express any concerns for suicidality, just requesting placement for physical therapy. Cousin does not feel patient can address his medical needs on his own. At this time, pt requires hospitalization for medication management, stabilization, and safe disposition.     #Adjustment disorder  -c/w Zoloft 50mg po daily    #Unsteady Gait  -PT consult     # Left pubic abscess  -  s/p debridement on 4/8, 410, 4/12 debridement with primary closure  -Continue wound care/ dressing changes  - continue w/ Augmentin 875 mg q12h for 7 more days (last dose on 4/23)    # DKA-resolved  # DM type2  - A1C: >15.5  -c/w sliding scale  -c/w Lantus 70 units po qhs   -c/w lispro 30 unit po TID       # CKD stage 2 -stable  -continue to hold lisinopril    # Hypertension- stable  -continue to monitor, continue to hold lisinopril    # Dyslipidemia  - c/w statin    #PRN  -Atarax 25mg po q6 PRN anxiety/insomnia  -For acute agitation not amenable to verbal redirection give haldol 2mg po, benadryl 50mg po q6 with escalation to IM if patient is danger to self/other; if IM formulation used please order repeat EKG to ensure qtc <500ms 60 year old  male, , non-caregiver, 3 adult children, on SSI previously worked in construction, 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 Patient admitted under 9.27 for depression with active   suicidal ideation in the context of social stressors including but not limited to perceived homelessness, isolation, and finances.     Upon evaluation today pt adamantly denies suicidal ideations, intent or plan. He endorses depressed mood but in the context of financial struggles. Patient denies history of suicidality and reports last SI 2 year ago. He identifies reasons for living (children) despite being estranged from children. Collateral obtained from cousin does not express any concerns for suicidality, just requesting placement for physical therapy. Cousin does not feel patient can address his medical needs on his own. At this time, pt requires hospitalization for medication management, stabilization, and safe disposition.     #Adjustment disorder  -c/w Zoloft 50mg po daily    #Unsteady Gait  -PT consult     # Left pubic abscess  -  s/p debridement on 4/8, 410, 4/12 debridement with primary closure  -Continue wound care/ dressing changes  - continue w/ Augmentin 875 mg q12h for 7 more days (last dose on 4/23)    # DKA-resolved  # DM type2  - A1C: >15.5  -c/w sliding scale  -c/w Lantus 70 units po qhs   -c/w lispro 30 unit po TID       # CKD stage 2 -stable  -continue to hold lisinopril    # Hypertension- stable  -continue to monitor, continue to hold lisinopril    # Dyslipidemia  - c/w statin    #DVT ppx  -spoke with PA [id number], given that patient remains immobile, recommendations were made for pt to Heparin subcutaneous 5000 units q8, as given on medicine unit prior to arrival to psychiatric unit     #PRN  -Atarax 25mg po q6 PRN anxiety/insomnia  -For acute agitation not amenable to verbal redirection give haldol 2mg po, benadryl 50mg po q6 with escalation to IM if patient is danger to self/other; if IM formulation used please order repeat EKG to ensure qtc <500ms

## 2021-04-16 NOTE — PROGRESS NOTE BEHAVIORAL HEALTH - NSBHFUPINTERVALHXFT_PSY_A_CORE
Chart reviewed, pt seen and examined at bedside. Upon evaluation, pt calm, linear, goal directed, noted to have impaired articulation due to recent dental work. Pt laying in hospital bed, appears to be in mild pain when adjusting self in bed, is noted to have walker at bedside. Pt reports that he doesn't understand why he's here. States that he did ingest sugar to kill self, but rather "drank sugar water, chocolate" "to make me feel good." Reports that the "social workers" "said I tried to kill myself" and adamantly denies endorsing suicidal thoughts to psychiatry team. He does however report last feeling suicidal 2 years ago. He however denies prior attempts and intent, identifying reasons for living "3 children, 2 grandchildren in [facility]." He however reports that he has not been in contact with them due to "their mother." He does endorse depressed mood, which he attributes to "finances." He endorses anhedonia, low energy. Pt reports that he has been having frequent falls at home and states that his fall led to his hospital admission. States that he does not want to go back to his 'windowless room" and additional states that he was "evicted." He reports having difficult time walking currently and is requesting physical therapy. Pt was started on Zoloft, states that he does not like it due to GI upset. Otherwise, offers no new complaints. Patient has been compliant with medication. Endorsed fair appetite/sleep. Denied A/V hallucinations. Denied paranoia. Denied active suicidal/homicidal ideation, intent or plan.    This writer spoke pt cousin (Chace [phone]) who also expresses confusion as to why  patient is in the impatent unit, states that he has never expressed depressed mood, or suicidal ideations, nor has a prior history of suicide attempt, or suicidal gestures. States that he only gets upset about his financial situation. States that he would go out with patient twice weekly to eat, states that pt is only in touch with sister who lives in [place]. States that patient has multiple health issues and is concerned for him to be alone due his poor health, states that he frequently falls. Expresses interest in sending patient to rehab for physical therapy. Cousin is unsure why patient has been non-compliant with his medications, states that in the past he was dieting and not eating much. Chart reviewed, pt seen and examined at bedside. Upon evaluation, pt calm, linear, goal directed, noted to have impaired articulation due to recent dental work. Pt laying in hospital bed, appears to be in mild pain when adjusting self in bed, is noted to have walker at bedside. Pt reports that he doesn't understand why he's here. States that he did not ingest sugar to kill self, but rather "drank sugar water, chocolate" "to make me feel good." Reports that the "social workers" "said I tried to kill myself" and adamantly denies endorsing suicidal thoughts to psychiatry team. He does however report last feeling suicidal 2 years ago. He denies prior attempts and intent, identifying reasons for living "3 children, 2 grandchildren in [facility]." He however reports that he has not been in contact with them due to "their mother." He does endorse depressed mood, which he attributes to "finances." He endorses anhedonia, low energy. Pt reports that he has been having frequent falls at home and states that his fall led to his hospital admission. States that he does not want to go back to his 'windowless room" and additionally states that he was "evicted." He reports having a difficult time walking currently and is requesting physical therapy. Pt was started on Zoloft, states that he does not like it due to GI upset. Otherwise, offers no new complaints. Patient has been compliant with medication. Endorsed fair appetite/sleep. Denied A/V hallucinations. Denied paranoia. Denied active suicidal/homicidal ideation, intent or plan.    This writer spoke pt cousin (Chace [phone]) who also expresses confusion as to why  patient is in the impatent unit, states that he has never expressed depressed mood, or suicidal ideations, nor has a prior history of suicide attempt, or suicidal gestures. States that he only gets upset about his financial situation. States that he would go out with patient twice weekly to eat, states that pt is only in touch with sister who lives in [place]. States that patient has multiple health issues and is concerned for him to be alone due his poor health, states that he frequently falls. Expresses interest in sending patient to rehab for physical therapy. Cousin is unsure why patient has been non-compliant with his medications, states that in the past he was dieting and not eating much.

## 2021-04-16 NOTE — PROGRESS NOTE BEHAVIORAL HEALTH - NSBHFUPADDHPIFT_PSY_A_CORE
Patient is a 60 year old  male,  from wife and children, on SSI, lives alone in house basement, pmh uncontrolled DM2, diabetic retinopathy and neuropathy, HTN, morbid obesity, and CKD 3 presented after a fall admitted for DKA management, psych consulted for evaluation of depression. On approach, patient resting in bed, appears down, stating "I'm depressed." Patient reported he was BIBEMS after he told a friend he fell at home; however, he explained that he had consumed high amounts of sugar in an attempt to become comatose and die. Reports that he fell and was in his urine for 24 hours. He states that he believes it is better off if he falls asleep and never wakes up and that if he had a gun he would have killed himself 4-5 years ago. Patient reports significant difficulty caring for himself and feeling lonely and depressed. Has had difficulty sleeping, poor interest, feelings of guilt/worthlessness, low energy, psychomotor retardation, and feeling depressed with passive suicidal ideation. Patient currently lives alone in a basement without windows, receives SSI and food stamps to sustain himself, which he reports struggles with. He is estranged from wife and 3 adult children who live in [facility]. Current stressors include poor health and need for help to care for medical conditions, reported poor social support from sister who lives on [place], loss of contact from children who refuse to speak with him, feelings of loneliness/depression. States that if he were to leave the hospital without assistance for depression and medical care, he would kill himself.    Patient denies AVH. Denies HI. Denies feelings of elevated mood and anxiety.    Patient did not provide permission to speak with family, allows collateral from friend, Chace, information above.

## 2021-04-16 NOTE — BH SAFETY PLAN - INTERNAL COPING STRATEGY 2
I like to watch sports television Another way I would cope with my depression would be to listen to some music, especially the music I grew up listening to.

## 2021-04-16 NOTE — PROGRESS NOTE BEHAVIORAL HEALTH - NSBHADDHXPSYSOCFT_PSY_A_CORE
Patient born and raised in [facility] in 1960. Family fled to [place] in 1971 to escape communism. 1974 moved to [place] and 1975 moved to [facility]. 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 Family fled to [place] in 1971 to escape communism. 1974 moved to [place] and 1975 moved to [facility]. 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

## 2021-04-16 NOTE — PHYSICAL THERAPY INITIAL EVALUATION ADULT - PERTINENT HX OF CURRENT PROBLEM, REHAB EVAL
Patient transported from [facility] , then transferred to [facility] ; PT consulted for gait assessment

## 2021-04-16 NOTE — PHYSICAL THERAPY INITIAL EVALUATION ADULT - GENERAL OBSERVATIONS, REHAB EVAL
14:00 - 14:25. Chart reviewed. Patient available to be seen for physical therapy, confirmed with nurse. Patient encountered seated at edge of bed. Denies pain at rest and is agreeable for PT evaluation now.

## 2021-04-17 LAB
GLUCOSE BLDC GLUCOMTR-MCNC: 106 MG/DL — HIGH (ref 70–99)
GLUCOSE BLDC GLUCOMTR-MCNC: 130 MG/DL — HIGH (ref 70–99)
GLUCOSE BLDC GLUCOMTR-MCNC: 174 MG/DL — HIGH (ref 70–99)
GLUCOSE BLDC GLUCOMTR-MCNC: 253 MG/DL — HIGH (ref 70–99)

## 2021-04-17 PROCEDURE — 99231 SBSQ HOSP IP/OBS SF/LOW 25: CPT

## 2021-04-17 RX ADMIN — ATORVASTATIN CALCIUM 40 MILLIGRAM(S): 80 TABLET, FILM COATED ORAL at 20:12

## 2021-04-17 RX ADMIN — HEPARIN SODIUM 5000 UNIT(S): 5000 INJECTION INTRAVENOUS; SUBCUTANEOUS at 13:18

## 2021-04-17 RX ADMIN — Medication 1: at 07:25

## 2021-04-17 RX ADMIN — HEPARIN SODIUM 5000 UNIT(S): 5000 INJECTION INTRAVENOUS; SUBCUTANEOUS at 20:15

## 2021-04-17 RX ADMIN — SERTRALINE 50 MILLIGRAM(S): 25 TABLET, FILM COATED ORAL at 09:03

## 2021-04-17 RX ADMIN — Medication 30 UNIT(S): at 16:01

## 2021-04-17 RX ADMIN — Medication 1 TABLET(S): at 20:12

## 2021-04-17 RX ADMIN — HEPARIN SODIUM 5000 UNIT(S): 5000 INJECTION INTRAVENOUS; SUBCUTANEOUS at 06:31

## 2021-04-17 RX ADMIN — INSULIN GLARGINE 70 UNIT(S): 100 INJECTION, SOLUTION SUBCUTANEOUS at 20:19

## 2021-04-17 RX ADMIN — Medication 3: at 16:00

## 2021-04-17 RX ADMIN — Medication 30 UNIT(S): at 11:17

## 2021-04-17 RX ADMIN — Medication 30 UNIT(S): at 07:25

## 2021-04-17 RX ADMIN — Medication 1 TABLET(S): at 09:03

## 2021-04-17 NOTE — PROGRESS NOTE BEHAVIORAL HEALTH - NSBHFUPINTERVALHXFT_PSY_A_CORE
Chart reviewed, nursing report discussed, pt was interviewed and assessed.    Pt was sitting in the chair, obese, calm but appears depressed. He said he was not suicidal though drinking soft drink. He said he did not sleep well because "the bed is not good".  He takes his meds and denies side effects from meds. Individual supportive therapy was provided.    Continue current treatment.

## 2021-04-18 LAB
CULTURE RESULTS: SIGNIFICANT CHANGE UP
GLUCOSE BLDC GLUCOMTR-MCNC: 138 MG/DL — HIGH (ref 70–99)
GLUCOSE BLDC GLUCOMTR-MCNC: 154 MG/DL — HIGH (ref 70–99)
GLUCOSE BLDC GLUCOMTR-MCNC: 85 MG/DL — SIGNIFICANT CHANGE UP (ref 70–99)
GLUCOSE BLDC GLUCOMTR-MCNC: 94 MG/DL — SIGNIFICANT CHANGE UP (ref 70–99)
SARS-COV-2 RNA SPEC QL NAA+PROBE: SIGNIFICANT CHANGE UP
SPECIMEN SOURCE: SIGNIFICANT CHANGE UP

## 2021-04-18 RX ORDER — INSULIN LISPRO 100/ML
15 VIAL (ML) SUBCUTANEOUS ONCE
Refills: 0 | Status: COMPLETED | OUTPATIENT
Start: 2021-04-18 | End: 2021-04-18

## 2021-04-18 RX ORDER — TRAZODONE HCL 50 MG
50 TABLET ORAL AT BEDTIME
Refills: 0 | Status: DISCONTINUED | OUTPATIENT
Start: 2021-04-18 | End: 2021-04-29

## 2021-04-18 RX ORDER — DIPHENHYDRAMINE HCL 50 MG
50 CAPSULE ORAL AT BEDTIME
Refills: 0 | Status: COMPLETED | OUTPATIENT
Start: 2021-04-18 | End: 2021-04-20

## 2021-04-18 RX ADMIN — HEPARIN SODIUM 5000 UNIT(S): 5000 INJECTION INTRAVENOUS; SUBCUTANEOUS at 06:06

## 2021-04-18 RX ADMIN — Medication 30 UNIT(S): at 11:34

## 2021-04-18 RX ADMIN — Medication 25 MILLIGRAM(S): at 20:43

## 2021-04-18 RX ADMIN — Medication 1: at 16:24

## 2021-04-18 RX ADMIN — Medication 1 TABLET(S): at 09:13

## 2021-04-18 RX ADMIN — Medication 1 TABLET(S): at 20:34

## 2021-04-18 RX ADMIN — HEPARIN SODIUM 5000 UNIT(S): 5000 INJECTION INTRAVENOUS; SUBCUTANEOUS at 22:59

## 2021-04-18 RX ADMIN — Medication 30 UNIT(S): at 16:24

## 2021-04-18 RX ADMIN — Medication 50 MILLIGRAM(S): at 22:59

## 2021-04-18 RX ADMIN — Medication 50 MILLIGRAM(S): at 20:34

## 2021-04-18 RX ADMIN — ATORVASTATIN CALCIUM 40 MILLIGRAM(S): 80 TABLET, FILM COATED ORAL at 20:34

## 2021-04-18 RX ADMIN — SERTRALINE 50 MILLIGRAM(S): 25 TABLET, FILM COATED ORAL at 09:13

## 2021-04-18 RX ADMIN — Medication 15 UNIT(S): at 07:41

## 2021-04-18 RX ADMIN — HEPARIN SODIUM 5000 UNIT(S): 5000 INJECTION INTRAVENOUS; SUBCUTANEOUS at 13:09

## 2021-04-18 NOTE — PROGRESS NOTE BEHAVIORAL HEALTH - NSBHADMITCOUNSEL_PSY_A_CORE
diagnostic results/impressions and/or recommended studies/risks and benefits of treatment options/instructions for management, treatment and follow up
diagnostic results/impressions and/or recommended studies/risks and benefits of treatment options/instructions for management, treatment and follow up

## 2021-04-19 LAB
GLUCOSE BLDC GLUCOMTR-MCNC: 126 MG/DL — HIGH (ref 70–99)
GLUCOSE BLDC GLUCOMTR-MCNC: 171 MG/DL — HIGH (ref 70–99)
GLUCOSE BLDC GLUCOMTR-MCNC: 209 MG/DL — HIGH (ref 70–99)
GLUCOSE BLDC GLUCOMTR-MCNC: 320 MG/DL — HIGH (ref 70–99)
GLUCOSE BLDC GLUCOMTR-MCNC: 369 MG/DL — HIGH (ref 70–99)

## 2021-04-19 PROCEDURE — 99232 SBSQ HOSP IP/OBS MODERATE 35: CPT

## 2021-04-19 RX ORDER — SERTRALINE 25 MG/1
100 TABLET, FILM COATED ORAL DAILY
Refills: 0 | Status: DISCONTINUED | OUTPATIENT
Start: 2021-04-20 | End: 2021-04-29

## 2021-04-19 RX ORDER — LANOLIN ALCOHOL/MO/W.PET/CERES
5 CREAM (GRAM) TOPICAL AT BEDTIME
Refills: 0 | Status: DISCONTINUED | OUTPATIENT
Start: 2021-04-19 | End: 2021-04-29

## 2021-04-19 RX ADMIN — INSULIN GLARGINE 70 UNIT(S): 100 INJECTION, SOLUTION SUBCUTANEOUS at 20:41

## 2021-04-19 RX ADMIN — Medication 2: at 11:19

## 2021-04-19 RX ADMIN — Medication 5 MILLIGRAM(S): at 20:44

## 2021-04-19 RX ADMIN — HEPARIN SODIUM 5000 UNIT(S): 5000 INJECTION INTRAVENOUS; SUBCUTANEOUS at 13:51

## 2021-04-19 RX ADMIN — Medication 50 MILLIGRAM(S): at 20:42

## 2021-04-19 RX ADMIN — HEPARIN SODIUM 5000 UNIT(S): 5000 INJECTION INTRAVENOUS; SUBCUTANEOUS at 20:44

## 2021-04-19 RX ADMIN — Medication 1 TABLET(S): at 08:36

## 2021-04-19 RX ADMIN — HEPARIN SODIUM 5000 UNIT(S): 5000 INJECTION INTRAVENOUS; SUBCUTANEOUS at 06:13

## 2021-04-19 RX ADMIN — Medication 5: at 06:46

## 2021-04-19 RX ADMIN — Medication 30 UNIT(S): at 16:01

## 2021-04-19 RX ADMIN — SERTRALINE 50 MILLIGRAM(S): 25 TABLET, FILM COATED ORAL at 08:36

## 2021-04-19 RX ADMIN — Medication 1: at 16:01

## 2021-04-19 RX ADMIN — Medication 1 TABLET(S): at 20:42

## 2021-04-19 RX ADMIN — Medication 30 UNIT(S): at 07:38

## 2021-04-19 RX ADMIN — ATORVASTATIN CALCIUM 40 MILLIGRAM(S): 80 TABLET, FILM COATED ORAL at 20:43

## 2021-04-19 RX ADMIN — Medication 30 UNIT(S): at 11:18

## 2021-04-19 NOTE — PROGRESS NOTE BEHAVIORAL HEALTH - SUMMARY
60 year old  male, , non-caregiver, 3 adult children, on SSI previously worked in construction, 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 Patient admitted under 9.27 for depression with active   suicidal ideation in the context of social stressors including but not limited to perceived homelessness, isolation, and finances.     Upon evaluation today pt continues to report improvement in mood and continues to deny suicidal ideations, intent or plan. Sleep remains poor, although pt contributes the quality of the bed. Patient remains focused on housing and disposition after discharge.     #Adjustment disorder  -increase Zoloft 50mg to 100mg po daily (increase on 4/20)  -continue w/ Trazodone 50mg po qhs for sleep (started on 4/18)  -start melatonin 5mg po qhs for sleep     #Unsteady Gait  -PT consult appreciated; continue with walker     # Left pubic abscess  -  s/p debridement on 4/8, 410, 4/12 debridement with primary closure  -Continue wound care/ dressing changes  - continue w/ Augmentin 875 mg q12h for 7 more days (last dose on 4/23)    # DKA-resolved  # DM type2  - A1C: >15.5  -c/w sliding scale  -c/w Lantus 70 units po qhs   -c/w lispro 30 unit po TID       # CKD stage 2 -stable  -continue to hold lisinopril    # Hypertension- stable  -continue to monitor, continue to hold lisinopril    # Dyslipidemia  - c/w statin    #DVT ppx  -spoke with PA [id number], given that patient remains immobile, recommendations were made for pt to Heparin subcutaneous 5000 units q8, as given on medicine unit prior to arrival to psychiatric unit     #PRN  -Atarax 25mg po q6 PRN anxiety/insomnia  -For acute agitation not amenable to verbal redirection give haldol 2mg po, benadryl 50mg po q6 with escalation to IM if patient is danger to self/other; if IM formulation used please order repeat EKG to ensure qtc <500ms

## 2021-04-19 NOTE — PROGRESS NOTE BEHAVIORAL HEALTH - MODIFICATIONS
seen/discussed with resident.  Mood dysphoric; no s/h ideation.  Will adjust meds and explore disposition issues/options.

## 2021-04-19 NOTE — PROGRESS NOTE BEHAVIORAL HEALTH - NSBHFUPINTERVALHXFT_PSY_A_CORE
Chart reviewed, pt seen and examined at bedside. No acute overnight events, no PRN.     Upon evaluation, pt remains in bed, he continues to report feeling "better" in "all the ways." Reports that the days here on the unit feel "slow." He continues to make complaints about the bed on the unit and reports poor sleep due to above. He continues to state that eating the soda and chocolate was not a suicide attempt. Stating that he "didn't' know better" and "wanted to feel good."  Affect appears to have improved as pt is noted to intermittently smile with treatment team. Pt is focused on obtaining housing upon discharged. Patient has been compliant with medication, denies negative side effects. Endorsed fair appetite. Denied A/V hallucinations. Denied paranoia. Denied active suicidal/homicidal ideation, intent or plan.

## 2021-04-20 ENCOUNTER — APPOINTMENT (OUTPATIENT)
Dept: BURN CARE | Facility: CLINIC | Age: 61
End: 2021-04-20

## 2021-04-20 LAB
GLUCOSE BLDC GLUCOMTR-MCNC: 133 MG/DL — HIGH (ref 70–99)
GLUCOSE BLDC GLUCOMTR-MCNC: 164 MG/DL — HIGH (ref 70–99)
GLUCOSE BLDC GLUCOMTR-MCNC: 197 MG/DL — HIGH (ref 70–99)
GLUCOSE BLDC GLUCOMTR-MCNC: 273 MG/DL — HIGH (ref 70–99)

## 2021-04-20 PROCEDURE — 99232 SBSQ HOSP IP/OBS MODERATE 35: CPT

## 2021-04-20 RX ORDER — INSULIN LISPRO 100/ML
15 VIAL (ML) SUBCUTANEOUS ONCE
Refills: 0 | Status: COMPLETED | OUTPATIENT
Start: 2021-04-20 | End: 2021-04-20

## 2021-04-20 RX ADMIN — HEPARIN SODIUM 5000 UNIT(S): 5000 INJECTION INTRAVENOUS; SUBCUTANEOUS at 13:20

## 2021-04-20 RX ADMIN — HEPARIN SODIUM 5000 UNIT(S): 5000 INJECTION INTRAVENOUS; SUBCUTANEOUS at 20:01

## 2021-04-20 RX ADMIN — Medication 15 UNIT(S): at 08:01

## 2021-04-20 RX ADMIN — Medication 50 MILLIGRAM(S): at 20:00

## 2021-04-20 RX ADMIN — Medication 30 UNIT(S): at 17:09

## 2021-04-20 RX ADMIN — Medication 1 TABLET(S): at 20:00

## 2021-04-20 RX ADMIN — HEPARIN SODIUM 5000 UNIT(S): 5000 INJECTION INTRAVENOUS; SUBCUTANEOUS at 06:43

## 2021-04-20 RX ADMIN — INSULIN GLARGINE 70 UNIT(S): 100 INJECTION, SOLUTION SUBCUTANEOUS at 20:01

## 2021-04-20 RX ADMIN — SERTRALINE 100 MILLIGRAM(S): 25 TABLET, FILM COATED ORAL at 08:02

## 2021-04-20 RX ADMIN — Medication 15 UNIT(S): at 11:39

## 2021-04-20 RX ADMIN — ATORVASTATIN CALCIUM 40 MILLIGRAM(S): 80 TABLET, FILM COATED ORAL at 20:00

## 2021-04-20 RX ADMIN — Medication 1 TABLET(S): at 08:01

## 2021-04-20 RX ADMIN — Medication 3: at 17:10

## 2021-04-20 RX ADMIN — Medication 5 MILLIGRAM(S): at 20:00

## 2021-04-20 NOTE — PROGRESS NOTE BEHAVIORAL HEALTH - NSBHFUPINTERVALHXFT_PSY_A_CORE
Chart reviewed, pt seen and examined at bedside. No acute overnight events. As per staff, pt more visible on the unit, seen walking down the hallways.      Upon approach, pt noted to be comfortably sleeping on bed, no noisy breathing or apneic breathing noted. On evaluation, he however continues to make complaints of poor sleep, stating that the "bed is too small" and that he was told he has sleep apnea. Patient was psycho-educated on sleep hygiene as he has been noted to be napping during the day. He reports that the interview with adult home went well yesterday. He however states that his "finances" and "being alone" continue to make him feel depressed. Remains contact with family while on the unit and continues to request visitors despite being educated that COVID protocol does not allow in person visits. He was encourage to attend groups and unit milieu. He reported he would go to day room once he got his dressing changed. Otherwise, he offered no new complaints and continues to make his needs met.  Patient has been compliant with medication, denies negative side effects. Endorsed fair appetite. Denied A/V hallucinations. Denied paranoia. Denied active suicidal/homicidal ideation, intent or plan.

## 2021-04-20 NOTE — PROGRESS NOTE BEHAVIORAL HEALTH - MODIFICATIONS
seen/discussed with resident.  No psychosis or s/h ideation. Mood improved. Will continue meds and d/c planning

## 2021-04-20 NOTE — PROGRESS NOTE BEHAVIORAL HEALTH - SUMMARY
60 year old  male, , non-caregiver, 3 adult children, on SSI previously worked in construction, 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 Patient admitted under 9.27 for depression with active   suicidal ideation in the context of social stressors including but not limited to perceived homelessness, isolation, and finances.     Upon evaluation today pt continues to report improvement in mood and continues to deny suicidal ideations, intent or plan. Sleep remains poor, although pt contributes this to the quality of the bed. Patient remains future oriented, continues to make his needs met and is focused on housing/disposition.     #Adjustment disorder  -continue w/ Zoloft 100mg po daily (increase on 4/20)  -continue w/ Trazodone 50mg po qhs for sleep (started on 4/18)  -continue w/ melatonin 5mg po qhs for sleep     #Unsteady Gait  -physical therapy consult appreciated; continue with walker     # Left pubic abscess  -  s/p debridement on 4/8, 410, 4/12 debridement with primary closure  -Continue wound care/ dressing changes  - continue w/ Augmentin 875 mg q12h for 3 more days (last dose on 4/23)    # DKA-resolved  # DM type2  - A1C: >15.5  -c/w sliding scale  -c/w Lantus 70 units po qhs   -c/w lispro 30 unit po TID       # CKD stage 2 -stable  -continue to hold lisinopril    # Hypertension- stable  -continue to monitor, continue to hold lisinopril    # Dyslipidemia  - c/w statin    #DVT ppx  -spoke with PA [id number], given that patient remains immobile, recommendations were made for pt to Heparin subcutaneous 5000 units q8, as given on medicine unit prior to arrival to psychiatric unit     #PRN  -Atarax 25mg po q6 PRN anxiety/insomnia  -For acute agitation not amenable to verbal redirection give haldol 2mg po, benadryl 50mg po q6 with escalation to IM if patient is danger to self/other; if IM formulation used please order repeat EKG to ensure qtc <500ms

## 2021-04-21 LAB
GLUCOSE BLDC GLUCOMTR-MCNC: 139 MG/DL — HIGH (ref 70–99)
GLUCOSE BLDC GLUCOMTR-MCNC: 248 MG/DL — HIGH (ref 70–99)
GLUCOSE BLDC GLUCOMTR-MCNC: 72 MG/DL — SIGNIFICANT CHANGE UP (ref 70–99)
GLUCOSE BLDC GLUCOMTR-MCNC: 74 MG/DL — SIGNIFICANT CHANGE UP (ref 70–99)

## 2021-04-21 PROCEDURE — 99231 SBSQ HOSP IP/OBS SF/LOW 25: CPT

## 2021-04-21 RX ADMIN — Medication 1 TABLET(S): at 20:28

## 2021-04-21 RX ADMIN — INSULIN GLARGINE 70 UNIT(S): 100 INJECTION, SOLUTION SUBCUTANEOUS at 20:28

## 2021-04-21 RX ADMIN — Medication 30 UNIT(S): at 11:30

## 2021-04-21 RX ADMIN — Medication 30 UNIT(S): at 16:10

## 2021-04-21 RX ADMIN — HEPARIN SODIUM 5000 UNIT(S): 5000 INJECTION INTRAVENOUS; SUBCUTANEOUS at 06:43

## 2021-04-21 RX ADMIN — HEPARIN SODIUM 5000 UNIT(S): 5000 INJECTION INTRAVENOUS; SUBCUTANEOUS at 20:29

## 2021-04-21 RX ADMIN — Medication 5 MILLIGRAM(S): at 20:29

## 2021-04-21 RX ADMIN — Medication 25 MILLIGRAM(S): at 01:52

## 2021-04-21 RX ADMIN — SERTRALINE 100 MILLIGRAM(S): 25 TABLET, FILM COATED ORAL at 09:12

## 2021-04-21 RX ADMIN — Medication 50 MILLIGRAM(S): at 20:28

## 2021-04-21 RX ADMIN — Medication 2: at 11:29

## 2021-04-21 RX ADMIN — HEPARIN SODIUM 5000 UNIT(S): 5000 INJECTION INTRAVENOUS; SUBCUTANEOUS at 13:09

## 2021-04-21 RX ADMIN — ATORVASTATIN CALCIUM 40 MILLIGRAM(S): 80 TABLET, FILM COATED ORAL at 20:28

## 2021-04-21 RX ADMIN — Medication 1 TABLET(S): at 09:12

## 2021-04-22 LAB
GLUCOSE BLDC GLUCOMTR-MCNC: 101 MG/DL — HIGH (ref 70–99)
GLUCOSE BLDC GLUCOMTR-MCNC: 105 MG/DL — HIGH (ref 70–99)
GLUCOSE BLDC GLUCOMTR-MCNC: 169 MG/DL — HIGH (ref 70–99)
GLUCOSE BLDC GLUCOMTR-MCNC: 174 MG/DL — HIGH (ref 70–99)
SARS-COV-2 RNA SPEC QL NAA+PROBE: SIGNIFICANT CHANGE UP

## 2021-04-22 PROCEDURE — 99231 SBSQ HOSP IP/OBS SF/LOW 25: CPT

## 2021-04-22 RX ORDER — INSULIN LISPRO 100/ML
15 VIAL (ML) SUBCUTANEOUS
Refills: 0 | Status: DISCONTINUED | OUTPATIENT
Start: 2021-04-22 | End: 2021-04-29

## 2021-04-22 RX ORDER — INSULIN GLARGINE 100 [IU]/ML
55 INJECTION, SOLUTION SUBCUTANEOUS AT BEDTIME
Refills: 0 | Status: DISCONTINUED | OUTPATIENT
Start: 2021-04-22 | End: 2021-04-23

## 2021-04-22 RX ADMIN — SERTRALINE 100 MILLIGRAM(S): 25 TABLET, FILM COATED ORAL at 08:24

## 2021-04-22 RX ADMIN — Medication 50 MILLIGRAM(S): at 20:16

## 2021-04-22 RX ADMIN — Medication 5 MILLIGRAM(S): at 20:15

## 2021-04-22 RX ADMIN — Medication 30 UNIT(S): at 07:24

## 2021-04-22 RX ADMIN — HEPARIN SODIUM 5000 UNIT(S): 5000 INJECTION INTRAVENOUS; SUBCUTANEOUS at 14:46

## 2021-04-22 RX ADMIN — Medication 30 UNIT(S): at 16:33

## 2021-04-22 RX ADMIN — HEPARIN SODIUM 5000 UNIT(S): 5000 INJECTION INTRAVENOUS; SUBCUTANEOUS at 20:33

## 2021-04-22 RX ADMIN — Medication 1 TABLET(S): at 20:16

## 2021-04-22 RX ADMIN — Medication 25 MILLIGRAM(S): at 23:47

## 2021-04-22 RX ADMIN — Medication 30 UNIT(S): at 12:01

## 2021-04-22 RX ADMIN — Medication 1 TABLET(S): at 08:24

## 2021-04-22 RX ADMIN — ATORVASTATIN CALCIUM 40 MILLIGRAM(S): 80 TABLET, FILM COATED ORAL at 20:15

## 2021-04-22 RX ADMIN — INSULIN GLARGINE 55 UNIT(S): 100 INJECTION, SOLUTION SUBCUTANEOUS at 20:31

## 2021-04-22 RX ADMIN — HEPARIN SODIUM 5000 UNIT(S): 5000 INJECTION INTRAVENOUS; SUBCUTANEOUS at 06:27

## 2021-04-22 NOTE — PROGRESS NOTE BEHAVIORAL HEALTH - MODIFICATIONS
seen/discussed with resident.  Continued improvement noted. Anticipate d/c tomorrow seen/discussed with resident.  Continued improvement noted. Anticipate d/c next week to residence

## 2021-04-22 NOTE — PROGRESS NOTE BEHAVIORAL HEALTH - NSBHFUPINTERVALHXFT_PSY_A_CORE
Chart reviewed, pt seen and examined at bedside. No acute overnight events, no PRN.     Upon evaluation, pt continues to report improved mood and is noted to be more visible on the unit, seen in groups and in the day room. He continues to report poor sleep, but he attributes this to the bed. Pt is future oriented, states that he looks forward to "finding my own apartment." He continues to remain in contact with cousin while on the unit. Pt was informed about possible discharge to [facility], which he was agreeable to and happy about. He has been compliant with medication, denies negative side effects. Endorsed fair appetite/sleep. Denied A/V hallucinations. Denied paranoia. Denied active suicidal/homicidal ideation, intent or plan.

## 2021-04-23 LAB
GLUCOSE BLDC GLUCOMTR-MCNC: 119 MG/DL — HIGH (ref 70–99)
GLUCOSE BLDC GLUCOMTR-MCNC: 139 MG/DL — HIGH (ref 70–99)
GLUCOSE BLDC GLUCOMTR-MCNC: 145 MG/DL — HIGH (ref 70–99)
GLUCOSE BLDC GLUCOMTR-MCNC: 149 MG/DL — HIGH (ref 70–99)
GLUCOSE BLDC GLUCOMTR-MCNC: 252 MG/DL — HIGH (ref 70–99)

## 2021-04-23 PROCEDURE — 71046 X-RAY EXAM CHEST 2 VIEWS: CPT | Mod: 26

## 2021-04-23 PROCEDURE — 99231 SBSQ HOSP IP/OBS SF/LOW 25: CPT

## 2021-04-23 RX ORDER — TUBERCULIN PURIFIED PROTEIN DERIVATIVE 5 [IU]/.1ML
5 INJECTION, SOLUTION INTRADERMAL ONCE
Refills: 0 | Status: DISCONTINUED | OUTPATIENT
Start: 2021-04-23 | End: 2021-04-23

## 2021-04-23 RX ORDER — INSULIN GLARGINE 100 [IU]/ML
29 INJECTION, SOLUTION SUBCUTANEOUS AT BEDTIME
Refills: 0 | Status: DISCONTINUED | OUTPATIENT
Start: 2021-04-23 | End: 2021-04-23

## 2021-04-23 RX ORDER — INSULIN GLARGINE 100 [IU]/ML
55 INJECTION, SOLUTION SUBCUTANEOUS AT BEDTIME
Refills: 0 | Status: DISCONTINUED | OUTPATIENT
Start: 2021-04-23 | End: 2021-04-29

## 2021-04-23 RX ORDER — INSULIN DETEMIR 100/ML (3)
29 INSULIN PEN (ML) SUBCUTANEOUS ONCE
Refills: 0 | Status: DISCONTINUED | OUTPATIENT
Start: 2021-04-23 | End: 2021-04-23

## 2021-04-23 RX ADMIN — Medication 25 MILLIGRAM(S): at 21:55

## 2021-04-23 RX ADMIN — HEPARIN SODIUM 5000 UNIT(S): 5000 INJECTION INTRAVENOUS; SUBCUTANEOUS at 20:24

## 2021-04-23 RX ADMIN — Medication 15 UNIT(S): at 17:14

## 2021-04-23 RX ADMIN — Medication 5 MILLIGRAM(S): at 20:24

## 2021-04-23 RX ADMIN — SERTRALINE 100 MILLIGRAM(S): 25 TABLET, FILM COATED ORAL at 09:10

## 2021-04-23 RX ADMIN — Medication 15 UNIT(S): at 11:39

## 2021-04-23 RX ADMIN — Medication 1 TABLET(S): at 09:10

## 2021-04-23 RX ADMIN — HEPARIN SODIUM 5000 UNIT(S): 5000 INJECTION INTRAVENOUS; SUBCUTANEOUS at 13:05

## 2021-04-23 RX ADMIN — INSULIN GLARGINE 55 UNIT(S): 100 INJECTION, SOLUTION SUBCUTANEOUS at 21:54

## 2021-04-23 RX ADMIN — ATORVASTATIN CALCIUM 40 MILLIGRAM(S): 80 TABLET, FILM COATED ORAL at 20:24

## 2021-04-23 RX ADMIN — Medication 50 MILLIGRAM(S): at 20:25

## 2021-04-23 RX ADMIN — Medication 15 UNIT(S): at 09:09

## 2021-04-24 LAB
GLUCOSE BLDC GLUCOMTR-MCNC: 103 MG/DL — HIGH (ref 70–99)
GLUCOSE BLDC GLUCOMTR-MCNC: 128 MG/DL — HIGH (ref 70–99)
GLUCOSE BLDC GLUCOMTR-MCNC: 200 MG/DL — HIGH (ref 70–99)
GLUCOSE BLDC GLUCOMTR-MCNC: 250 MG/DL — HIGH (ref 70–99)
GLUCOSE BLDC GLUCOMTR-MCNC: 98 MG/DL — SIGNIFICANT CHANGE UP (ref 70–99)

## 2021-04-24 RX ADMIN — ATORVASTATIN CALCIUM 40 MILLIGRAM(S): 80 TABLET, FILM COATED ORAL at 20:07

## 2021-04-24 RX ADMIN — HEPARIN SODIUM 5000 UNIT(S): 5000 INJECTION INTRAVENOUS; SUBCUTANEOUS at 15:35

## 2021-04-24 RX ADMIN — HEPARIN SODIUM 5000 UNIT(S): 5000 INJECTION INTRAVENOUS; SUBCUTANEOUS at 20:07

## 2021-04-24 RX ADMIN — Medication 50 MILLIGRAM(S): at 20:07

## 2021-04-24 RX ADMIN — SERTRALINE 100 MILLIGRAM(S): 25 TABLET, FILM COATED ORAL at 08:01

## 2021-04-24 RX ADMIN — HEPARIN SODIUM 5000 UNIT(S): 5000 INJECTION INTRAVENOUS; SUBCUTANEOUS at 06:25

## 2021-04-24 RX ADMIN — Medication 5 MILLIGRAM(S): at 20:07

## 2021-04-24 RX ADMIN — Medication 15 UNIT(S): at 07:34

## 2021-04-24 RX ADMIN — Medication 15 UNIT(S): at 16:47

## 2021-04-24 RX ADMIN — Medication 1: at 16:46

## 2021-04-24 RX ADMIN — Medication 25 MILLIGRAM(S): at 21:55

## 2021-04-24 RX ADMIN — INSULIN GLARGINE 55 UNIT(S): 100 INJECTION, SOLUTION SUBCUTANEOUS at 21:56

## 2021-04-24 RX ADMIN — Medication 15 UNIT(S): at 11:27

## 2021-04-25 LAB
GLUCOSE BLDC GLUCOMTR-MCNC: 104 MG/DL — HIGH (ref 70–99)
GLUCOSE BLDC GLUCOMTR-MCNC: 122 MG/DL — HIGH (ref 70–99)
GLUCOSE BLDC GLUCOMTR-MCNC: 241 MG/DL — HIGH (ref 70–99)
GLUCOSE BLDC GLUCOMTR-MCNC: 245 MG/DL — HIGH (ref 70–99)
GLUCOSE BLDC GLUCOMTR-MCNC: 74 MG/DL — SIGNIFICANT CHANGE UP (ref 70–99)

## 2021-04-25 RX ADMIN — HEPARIN SODIUM 5000 UNIT(S): 5000 INJECTION INTRAVENOUS; SUBCUTANEOUS at 20:11

## 2021-04-25 RX ADMIN — INSULIN GLARGINE 55 UNIT(S): 100 INJECTION, SOLUTION SUBCUTANEOUS at 21:22

## 2021-04-25 RX ADMIN — HEPARIN SODIUM 5000 UNIT(S): 5000 INJECTION INTRAVENOUS; SUBCUTANEOUS at 14:14

## 2021-04-25 RX ADMIN — HEPARIN SODIUM 5000 UNIT(S): 5000 INJECTION INTRAVENOUS; SUBCUTANEOUS at 06:27

## 2021-04-25 RX ADMIN — Medication 25 MILLIGRAM(S): at 21:21

## 2021-04-25 RX ADMIN — Medication 50 MILLIGRAM(S): at 20:11

## 2021-04-25 RX ADMIN — Medication 15 UNIT(S): at 16:30

## 2021-04-25 RX ADMIN — SERTRALINE 100 MILLIGRAM(S): 25 TABLET, FILM COATED ORAL at 08:32

## 2021-04-25 RX ADMIN — Medication 2: at 16:30

## 2021-04-25 RX ADMIN — Medication 15 UNIT(S): at 08:30

## 2021-04-25 RX ADMIN — Medication 5 MILLIGRAM(S): at 20:10

## 2021-04-25 RX ADMIN — ATORVASTATIN CALCIUM 40 MILLIGRAM(S): 80 TABLET, FILM COATED ORAL at 20:10

## 2021-04-26 ENCOUNTER — APPOINTMENT (OUTPATIENT)
Dept: PODIATRY | Facility: CLINIC | Age: 61
End: 2021-04-26

## 2021-04-26 DIAGNOSIS — E78.5 HYPERLIPIDEMIA, UNSPECIFIED: ICD-10-CM

## 2021-04-26 DIAGNOSIS — E11.22 TYPE 2 DIABETES MELLITUS WITH DIABETIC CHRONIC KIDNEY DISEASE: ICD-10-CM

## 2021-04-26 DIAGNOSIS — Z91.81 HISTORY OF FALLING: ICD-10-CM

## 2021-04-26 DIAGNOSIS — I12.9 HYPERTENSIVE CHRONIC KIDNEY DISEASE WITH STAGE 1 THROUGH STAGE 4 CHRONIC KIDNEY DISEASE, OR UNSPECIFIED CHRONIC KIDNEY DISEASE: ICD-10-CM

## 2021-04-26 DIAGNOSIS — Z91.14 PATIENT'S OTHER NONCOMPLIANCE WITH MEDICATION REGIMEN: ICD-10-CM

## 2021-04-26 DIAGNOSIS — E11.42 TYPE 2 DIABETES MELLITUS WITH DIABETIC POLYNEUROPATHY: ICD-10-CM

## 2021-04-26 DIAGNOSIS — Z79.84 LONG TERM (CURRENT) USE OF ORAL HYPOGLYCEMIC DRUGS: ICD-10-CM

## 2021-04-26 DIAGNOSIS — N18.30 CHRONIC KIDNEY DISEASE, STAGE 3 UNSPECIFIED: ICD-10-CM

## 2021-04-26 DIAGNOSIS — L02.214 CUTANEOUS ABSCESS OF GROIN: ICD-10-CM

## 2021-04-26 DIAGNOSIS — F32.2 MAJOR DEPRESSIVE DISORDER, SINGLE EPISODE, SEVERE WITHOUT PSYCHOTIC FEATURES: ICD-10-CM

## 2021-04-26 DIAGNOSIS — N17.9 ACUTE KIDNEY FAILURE, UNSPECIFIED: ICD-10-CM

## 2021-04-26 DIAGNOSIS — E87.5 HYPERKALEMIA: ICD-10-CM

## 2021-04-26 DIAGNOSIS — E11.319 TYPE 2 DIABETES MELLITUS WITH UNSPECIFIED DIABETIC RETINOPATHY WITHOUT MACULAR EDEMA: ICD-10-CM

## 2021-04-26 DIAGNOSIS — E66.01 MORBID (SEVERE) OBESITY DUE TO EXCESS CALORIES: ICD-10-CM

## 2021-04-26 DIAGNOSIS — R45.1 RESTLESSNESS AND AGITATION: ICD-10-CM

## 2021-04-26 DIAGNOSIS — E11.10 TYPE 2 DIABETES MELLITUS WITH KETOACIDOSIS WITHOUT COMA: ICD-10-CM

## 2021-04-26 DIAGNOSIS — R00.0 TACHYCARDIA, UNSPECIFIED: ICD-10-CM

## 2021-04-26 DIAGNOSIS — E87.1 HYPO-OSMOLALITY AND HYPONATREMIA: ICD-10-CM

## 2021-04-26 DIAGNOSIS — R00.1 BRADYCARDIA, UNSPECIFIED: ICD-10-CM

## 2021-04-26 DIAGNOSIS — I96 GANGRENE, NOT ELSEWHERE CLASSIFIED: ICD-10-CM

## 2021-04-26 DIAGNOSIS — E83.42 HYPOMAGNESEMIA: ICD-10-CM

## 2021-04-26 DIAGNOSIS — E86.0 DEHYDRATION: ICD-10-CM

## 2021-04-26 DIAGNOSIS — E87.2 ACIDOSIS: ICD-10-CM

## 2021-04-26 DIAGNOSIS — R45.851 SUICIDAL IDEATIONS: ICD-10-CM

## 2021-04-26 DIAGNOSIS — A41.9 SEPSIS, UNSPECIFIED ORGANISM: ICD-10-CM

## 2021-04-26 DIAGNOSIS — L03.314 CELLULITIS OF GROIN: ICD-10-CM

## 2021-04-26 LAB
GLUCOSE BLDC GLUCOMTR-MCNC: 136 MG/DL — HIGH (ref 70–99)
GLUCOSE BLDC GLUCOMTR-MCNC: 151 MG/DL — HIGH (ref 70–99)
GLUCOSE BLDC GLUCOMTR-MCNC: 226 MG/DL — HIGH (ref 70–99)
GLUCOSE BLDC GLUCOMTR-MCNC: 80 MG/DL — SIGNIFICANT CHANGE UP (ref 70–99)
SARS-COV-2 RNA SPEC QL NAA+PROBE: SIGNIFICANT CHANGE UP

## 2021-04-26 PROCEDURE — 99231 SBSQ HOSP IP/OBS SF/LOW 25: CPT

## 2021-04-26 RX ORDER — ISOPROPYL ALCOHOL, BENZOCAINE .7; .06 ML/ML; ML/ML
1 SWAB TOPICAL
Qty: 100 | Refills: 1
Start: 2021-04-26 | End: 2021-06-14

## 2021-04-26 RX ADMIN — INSULIN GLARGINE 55 UNIT(S): 100 INJECTION, SOLUTION SUBCUTANEOUS at 20:25

## 2021-04-26 RX ADMIN — Medication 50 MILLIGRAM(S): at 20:24

## 2021-04-26 RX ADMIN — Medication 5 MILLIGRAM(S): at 20:24

## 2021-04-26 RX ADMIN — ATORVASTATIN CALCIUM 40 MILLIGRAM(S): 80 TABLET, FILM COATED ORAL at 20:24

## 2021-04-26 RX ADMIN — Medication 25 MILLIGRAM(S): at 21:17

## 2021-04-26 RX ADMIN — Medication 2: at 11:16

## 2021-04-26 RX ADMIN — Medication 15 UNIT(S): at 16:10

## 2021-04-26 RX ADMIN — HEPARIN SODIUM 5000 UNIT(S): 5000 INJECTION INTRAVENOUS; SUBCUTANEOUS at 21:01

## 2021-04-26 RX ADMIN — SERTRALINE 100 MILLIGRAM(S): 25 TABLET, FILM COATED ORAL at 08:09

## 2021-04-26 RX ADMIN — Medication 15 UNIT(S): at 11:17

## 2021-04-26 RX ADMIN — HEPARIN SODIUM 5000 UNIT(S): 5000 INJECTION INTRAVENOUS; SUBCUTANEOUS at 06:18

## 2021-04-26 RX ADMIN — HEPARIN SODIUM 5000 UNIT(S): 5000 INJECTION INTRAVENOUS; SUBCUTANEOUS at 14:12

## 2021-04-26 NOTE — PROGRESS NOTE BEHAVIORAL HEALTH - NSBHFUPINTERVALHXFT_PSY_A_CORE
Chart reviewed, pt seen and examined at bedside. As per staff, no acute overnight events. Upon evaluation, pt continues to report improvement in mood. Pt is future oriented and is looking forward to discharge to Adult home. He has been visible on the unit. He continues to make complaints about size of bed on the unit.  Otherwise, he offers no new complaints. Patient has been compliant with medication, denies negative side effects. Endorsed fair appetite/sleep. Denied A/V hallucinations. Denied paranoia. Denied active suicidal/homicidal ideation, intent or plan. Chart reviewed, pt seen and examined at bedside. As per staff, no acute overnight events. Upon evaluation, pt continues to report improvement in mood. Pt is future oriented and is looking forward to discharge to [facility]. He has been visible on the unit. He continues to make complaints about size of bed on the unit.  Otherwise, he offers no new complaints. Patient has been compliant with medication, denies negative side effects. Endorsed fair appetite/sleep. Denied A/V hallucinations. Denied paranoia. Denied active suicidal/homicidal ideation, intent or plan.

## 2021-04-26 NOTE — PROGRESS NOTE BEHAVIORAL HEALTH - SUMMARY
60 year old  male, , non-caregiver, 3 adult children, on SSI previously worked in construction, 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 Patient admitted under 9.27 for depression with active   suicidal ideation in the context of social stressors including but not limited to perceived homelessness, isolation, and finances.     Upon evaluation today pt continues to report improvement in mood and continues to deny suicidal ideations, intent or plan. Sleep remains poor, although pt contributes this to the quality of the bed. Patient remains future oriented, continues to make his needs met. Patient anticipated      #Adjustment disorder  -continue w/ Zoloft 100mg po daily (increase on 4/20)  -continue w/ Trazodone 50mg po qhs for sleep (started on 4/18)  -continue w/ melatonin 5mg po qhs for sleep     #Unsteady Gait  -physical therapy consult appreciated; continue with walker     # Left pubic abscess  -  s/p debridement on 4/8, 410, 4/12 debridement with primary closure  -Continue wound care/ dressing changes  - continue w/ Augmentin 875 mg q12h for 3 more days (last dose on 4/23)    # DKA-resolved  # DM type2  - A1C: >15.5  -c/w sliding scale  -c/w Lantus 70 units po qhs   -c/w lispro 30 unit po TID       # CKD stage 2 -stable  -continue to hold lisinopril    # Hypertension- stable  -continue to monitor, continue to hold lisinopril    # Dyslipidemia  - c/w statin    #DVT ppx  -spoke with PA [id number], given that patient remains immobile, recommendations were made for pt to Heparin subcutaneous 5000 units q8, as given on medicine unit prior to arrival to psychiatric unit     #PRN  -Atarax 25mg po q6 PRN anxiety/insomnia  -For acute agitation not amenable to verbal redirection give haldol 2mg po, benadryl 50mg po q6 with escalation to IM if patient is danger to self/other; if IM formulation used please order repeat EKG to ensure qtc <500ms 60 year old  male, , non-caregiver, 3 adult children, on SSI previously worked in construction, 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 Patient admitted under 9.27 for depression with active   suicidal ideation in the context of social stressors including but not limited to perceived homelessness, isolation, and finances.     Upon evaluation today pt continues to report improvement in mood and continues to deny suicidal ideations, intent or plan. Sleep remains poor, although pt contributes this to the quality of the bed. Patient remains future oriented, continues to make his needs met. Patient anticipated  for discharge to [facility] on Wednesday.     #Adjustment disorder  -continue w/ Zoloft 100mg po daily (increase on 4/20)  -continue w/ Trazodone 50mg po qhs for sleep (started on 4/18)  -continue w/ melatonin 5mg po qhs for sleep     #Unsteady Gait  -physical therapy consult appreciated; continue with walker     # Left pubic abscess  -  s/p debridement on 4/8, 410, 4/12 debridement with primary closure  -Continue wound care/ dressing changes  - continue w/ Augmentin 875 mg q12h for 3 more days (last dose on 4/23)    # DKA-resolved  # DM type2  - A1C: >15.5  -c/w sliding scale  -c/w Lantus 70 units po qhs   -c/w lispro 30 unit po TID       # CKD stage 2 -stable  -continue to hold lisinopril    # Hypertension- stable  -continue to monitor, continue to hold lisinopril    # Dyslipidemia  - c/w statin    #DVT ppx  -spoke with PA [id number], given that patient remains immobile, recommendations were made for pt to Heparin subcutaneous 5000 units q8, as given on medicine unit prior to arrival to psychiatric unit     #PRN  -Atarax 25mg po q6 PRN anxiety/insomnia  -For acute agitation not amenable to verbal redirection give haldol 2mg po, benadryl 50mg po q6 with escalation to IM if patient is danger to self/other; if IM formulation used please order repeat EKG to ensure qtc <500ms

## 2021-04-27 LAB
GAMMA INTERFERON BACKGROUND BLD IA-ACNC: 0.04 IU/ML — SIGNIFICANT CHANGE UP
GLUCOSE BLDC GLUCOMTR-MCNC: 174 MG/DL — HIGH (ref 70–99)
GLUCOSE BLDC GLUCOMTR-MCNC: 205 MG/DL — HIGH (ref 70–99)
GLUCOSE BLDC GLUCOMTR-MCNC: 264 MG/DL — HIGH (ref 70–99)
M TB IFN-G BLD-IMP: POSITIVE
M TB IFN-G CD4+ BCKGRND COR BLD-ACNC: 2.62 IU/ML — SIGNIFICANT CHANGE UP
M TB IFN-G CD4+CD8+ BCKGRND COR BLD-ACNC: 2.26 IU/ML — SIGNIFICANT CHANGE UP
QUANT TB PLUS MITOGEN MINUS NIL: 3.9 IU/ML — SIGNIFICANT CHANGE UP

## 2021-04-27 PROCEDURE — 99231 SBSQ HOSP IP/OBS SF/LOW 25: CPT

## 2021-04-27 RX ORDER — ATORVASTATIN CALCIUM 80 MG/1
1 TABLET, FILM COATED ORAL
Qty: 30 | Refills: 0
Start: 2021-04-27 | End: 2021-05-26

## 2021-04-27 RX ORDER — ATORVASTATIN CALCIUM 80 MG/1
1 TABLET, FILM COATED ORAL
Qty: 0 | Refills: 0 | DISCHARGE

## 2021-04-27 RX ORDER — LANOLIN ALCOHOL/MO/W.PET/CERES
1 CREAM (GRAM) TOPICAL
Qty: 30 | Refills: 0
Start: 2021-04-27 | End: 2021-05-26

## 2021-04-27 RX ORDER — INSULIN LISPRO 100/ML
15 VIAL (ML) SUBCUTANEOUS
Qty: 15 | Refills: 0
Start: 2021-04-27 | End: 2021-05-26

## 2021-04-27 RX ORDER — SERTRALINE 25 MG/1
1 TABLET, FILM COATED ORAL
Qty: 30 | Refills: 0
Start: 2021-04-27 | End: 2021-05-26

## 2021-04-27 RX ORDER — TRAZODONE HCL 50 MG
1 TABLET ORAL
Qty: 30 | Refills: 0
Start: 2021-04-27 | End: 2021-05-26

## 2021-04-27 RX ORDER — INSULIN GLARGINE 100 [IU]/ML
55 INJECTION, SOLUTION SUBCUTANEOUS
Qty: 17 | Refills: 0
Start: 2021-04-27 | End: 2021-05-26

## 2021-04-27 RX ADMIN — INSULIN GLARGINE 55 UNIT(S): 100 INJECTION, SOLUTION SUBCUTANEOUS at 21:19

## 2021-04-27 RX ADMIN — Medication 5 MILLIGRAM(S): at 21:20

## 2021-04-27 RX ADMIN — Medication 15 UNIT(S): at 07:42

## 2021-04-27 RX ADMIN — HEPARIN SODIUM 5000 UNIT(S): 5000 INJECTION INTRAVENOUS; SUBCUTANEOUS at 05:45

## 2021-04-27 RX ADMIN — ATORVASTATIN CALCIUM 40 MILLIGRAM(S): 80 TABLET, FILM COATED ORAL at 21:20

## 2021-04-27 RX ADMIN — SERTRALINE 100 MILLIGRAM(S): 25 TABLET, FILM COATED ORAL at 07:45

## 2021-04-27 RX ADMIN — Medication 15 UNIT(S): at 16:55

## 2021-04-27 RX ADMIN — Medication 3: at 16:56

## 2021-04-27 RX ADMIN — Medication 50 MILLIGRAM(S): at 21:20

## 2021-04-27 RX ADMIN — Medication 2: at 07:42

## 2021-04-27 RX ADMIN — HEPARIN SODIUM 5000 UNIT(S): 5000 INJECTION INTRAVENOUS; SUBCUTANEOUS at 21:20

## 2021-04-27 NOTE — DISCHARGE NOTE BEHAVIORAL HEALTH - NSBHDCADDFT_PSY_A_CORE
Of note, during patient's hospital admission, he was found to have positive QuantiFeron test. However acid fast smear was negative, and CXR was unremarkable. Pt was informed of resulted and instructed to follow up with medicine at [facility] for further management. Patient was asymptomatic throughout hospital course.

## 2021-04-27 NOTE — PROGRESS NOTE BEHAVIORAL HEALTH - RISK ASSESSMENT
RF: social isolation, multiple medical comorbidities, male sex, financial/housing instability, depressed mood  PF: identifies reasons for living, no prior hx of SA, help seeking behavior, no known history of prior hospitalizations, no access to means (guns)

## 2021-04-27 NOTE — DISCHARGE NOTE BEHAVIORAL HEALTH - HPI (INCLUDE ILLNESS QUALITY, SEVERITY, DURATION, TIMING, CONTEXT, MODIFYING FACTORS, ASSOCIATED SIGNS AND SYMPTOMS)
Patient is a 60 year old  male,  from wife and children, on SSI, lives alone in house basement, pmh uncontrolled DM2, diabetic retinopathy and neuropathy, HTN, morbid obesity, and CKD 3 presented after a fall admitted for DKA management, psych consulted for evaluation of depression. On approach, patient resting in bed, appears down, stating "I'm depressed." Patient reported he was BIBEMS after he told a friend he fell at home; however, he explained that he had consumed high amounts of sugar in an attempt to become comatose and die. Reports that he fell and was in his urine for 24 hours. He states that he believes it is better off if he falls asleep and never wakes up and that if he had a gun he would have killed himself 4-5 years ago. Patient reports significant difficulty caring for himself and feeling lonely and depressed. Has had difficulty sleeping, poor interest, feelings of guilt/worthlessness, low energy, psychomotor retardation, and feeling depressed with passive suicidal ideation. Patient currently lives alone in a basement without windows, receives SSI and food stamps to sustain himself, which he reports struggles with. He is estranged from wife and 3 adult children who live in [place]. Current stressors include poor health and need for help to care for medical conditions, reported poor social support from sister who lives on [place], loss of contact from children who refuse to speak with him, feelings of loneliness/depression. States that if he were to leave the hospital without assistance for depression and medical care, he would kill himself.    Patient denies AVH. Denies HI. Denies feelings of elevated mood and anxiety.    Patient did not provide permission to speak with family, allows collateral from friend, Chace, information above. Patient is a 60 year old  male,  from wife and children, on SSI, lives alone in house basement, pmh uncontrolled DM2, diabetic retinopathy and neuropathy, HTN, morbid obesity, and CKD 3 presented after a fall admitted for DKA management, psych consulted for evaluation of depression. On approach, patient resting in bed, appears down, stating "I'm depressed." Patient reported he was BIBEMS after he told a friend he fell at home; however, he explained that he had consumed high amounts of sugar in an attempt to become comatose and die. Reports that he fell and was in his urine for 24 hours. He states that he believes it is better off if he falls asleep and never wakes up and that if he had a gun he would have killed himself 4-5 years ago. Patient reports significant difficulty caring for himself and feeling lonely and depressed. Has had difficulty sleeping, poor interest, feelings of guilt/worthlessness, low energy, psychomotor retardation, and feeling depressed with passive suicidal ideation. Patient currently lives alone in a basement without windows, receives SSI and food stamps to sustain himself, which he reports struggles with. He is estranged from wife and 3 adult children who live in [facility]. Current stressors include poor health and need for help to care for medical conditions, reported poor social support from sister who lives on [place], loss of contact from children who refuse to speak with him, feelings of loneliness/depression. States that if he were to leave the hospital without assistance for depression and medical care, he would kill himself.    Patient denies AVH. Denies HI. Denies feelings of elevated mood and anxiety.    Patient did not provide permission to speak with family, allows collateral from friend, Chace, information above.      Of note, during patient's hospital admission, he was found to have positive QuantiFeron test. However acid fast smear was negative, and CXR was unremarkable. Pt was informed of resulted and instructed to follow up with medicine at [facility] for further management. Patient was asymptomatic throughout hospital course.

## 2021-04-27 NOTE — DISCHARGE NOTE BEHAVIORAL HEALTH - NSBHDCHOUSINGFT_PSY_A_CORE
[facility] [place]   [address] [address]

## 2021-04-27 NOTE — DISCHARGE NOTE BEHAVIORAL HEALTH - NSBHDCRESPONSEFT_PSY_A_CORE
Patient has made significant progress over the course of hospitalization. With continuous psychotherapy from the treatment team and medications patient reports feeling better, sleeping, and eating well. Thought process and insight improved. Patient was calm and cooperative and was in good behavioral control. Patient denied any suicidal or homicidal ideations. Patient denied any auditory of visual hallucinations. Patient was more visible on the unit, attending groups. Pt was evaluated by treatment team, pt is stable for discharge and shows no imminent risk to self other or property at this item. Pt family reported that patient appeared to be at baseline and did not have any safety concerns.

## 2021-04-27 NOTE — DISCHARGE NOTE BEHAVIORAL HEALTH - MEDICATION SUMMARY - MEDICATIONS TO CHANGE
I will SWITCH the dose or number of times a day I take the medications listed below when I get home from the hospital:    insulin lispro 100 units/mL injectable solution  -- 30 unit(s) subcutaneous 3 times a day (before meals)    insulin glargine  -- 70 unit(s) subcutaneous once a day (at bedtime)    sertraline 50 mg oral tablet  -- 1 tab(s) by mouth once a day

## 2021-04-27 NOTE — PROGRESS NOTE BEHAVIORAL HEALTH - NSBHFUPINTERVALHXFT_PSY_A_CORE
Chart reviewed, pt seen and examined at bedside. As per staff, no acute overnight events.     Upon evaluation, pt continues to report improvement in mood. Reports that he is looking forwards to discharge to adult home. Patient has been attending to his ADLS, continues to make needs met, often requesting for things such assistance with shaving today. He has been visible on the unit. He also continues to make complaints about size of bed on the unit.    Otherwise, he offers no new complaints. Patient has been compliant with medication, denies negative side effects. Endorsed fair appetite/sleep. Denied A/V hallucinations. Denied paranoia. Denied active suicidal/homicidal ideation, intent or plan.

## 2021-04-27 NOTE — PROGRESS NOTE BEHAVIORAL HEALTH - SUMMARY
60 year old  male, , non-caregiver, 3 adult children, on SSI previously worked in construction, 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 Patient admitted under 9.27 for depression with active   suicidal ideation in the context of social stressors including but not limited to perceived homelessness, isolation, and finances.     Upon evaluation today pt continues to report improvement in mood and continues to deny suicidal ideations, intent or plan. Sleep remains poor, although pt contributes this to the quality of the bed. Patient remains future oriented, continues to make his needs met. Patient anticipated  for discharge to [facility] on Wednesday.     #Adjustment disorder  -continue w/ Zoloft 100mg po daily (increased on 4/20)  -continue w/ Trazodone 50mg po qhs for sleep (started on 4/18)  -continue w/ melatonin 5mg po qhs for sleep     #Unsteady Gait  -physical therapy consult appreciated; continue with walker     # Left pubic abscess  -  s/p debridement on 4/8, 410, 4/12 debridement with primary closure  -Continue wound care/ dressing changes  - continue w/ Augmentin 875 mg q12h for 3 more days (last dose on 4/23)    # DKA-resolved  # DM type2  - A1C: >15.5  -c/w sliding scale  -c/w Lantus 70 units po qhs   -c/w lispro 30 unit po TID       # CKD stage 2 -stable  -continue to hold lisinopril    # Hypertension- stable  -continue to monitor, continue to hold lisinopril    # Dyslipidemia  - c/w statin    #DVT ppx  -spoke with PA [id number], given that patient remains immobile, recommendations were made for pt to Heparin subcutaneous 5000 units q8, as given on medicine unit prior to arrival to psychiatric unit     #PRN  -Atarax 25mg po q6 PRN anxiety/insomnia  -For acute agitation not amenable to verbal redirection give haldol 2mg po, benadryl 50mg po q6 with escalation to IM if patient is danger to self/other; if IM formulation used please order repeat EKG to ensure qtc <500ms

## 2021-04-27 NOTE — PROGRESS NOTE BEHAVIORAL HEALTH - NSBHCHARTREVIEWVS_PSY_A_CORE FT
ICU Vital Signs Last 24 Hrs  T(C): 36.4 (27 Apr 2021 08:18), Max: 36.9 (26 Apr 2021 18:13)  T(F): 97.5 (27 Apr 2021 08:18), Max: 98.5 (26 Apr 2021 18:13)  HR: 52 (27 Apr 2021 08:18) (52 - 70)  BP: 86/50 (27 Apr 2021 08:18) (86/50 - 92/59)  BP(mean): --  ABP: --  ABP(mean): --  RR: 16 (27 Apr 2021 08:18) (16 - 18)  SpO2: --
ICU Vital Signs Last 24 Hrs  T(C): 36.8 (19 Apr 2021 08:30), Max: 37.2 (18 Apr 2021 18:49)  T(F): 98.3 (19 Apr 2021 08:30), Max: 98.9 (18 Apr 2021 18:49)  HR: 87 (19 Apr 2021 08:30) (84 - 112)  BP: 114/66 (19 Apr 2021 08:30) (114/66 - 120/55)  BP(mean): --  ABP: --  ABP(mean): --  RR: 18 (19 Apr 2021 08:30) (18 - 18)  SpO2: --
Vital Signs Last 24 Hrs  T(C): 36 (18 Apr 2021 10:44), Max: 36.3 (18 Apr 2021 06:23)  T(F): 96.8 (18 Apr 2021 10:44), Max: 97.3 (18 Apr 2021 06:23)  HR: 85 (18 Apr 2021 10:44) (85 - 86)  BP: 114/65 (18 Apr 2021 10:44) (114/65 - 120/64)  BP(mean): --  RR: 16 (18 Apr 2021 10:44) (16 - 20)  SpO2: --
ICU Vital Signs Last 24 Hrs  T(C): 36.6 (16 Apr 2021 09:43), Max: 36.9 (15 Apr 2021 20:37)  T(F): 97.9 (16 Apr 2021 09:43), Max: 98.4 (15 Apr 2021 20:37)  HR: 91 (16 Apr 2021 09:43) (71 - 91)  BP: 103/655 (16 Apr 2021 09:43) (103/655 - 132/85)  BP(mean): --  ABP: --  ABP(mean): --  RR: 18 (16 Apr 2021 09:43) (16 - 18)  SpO2: --
ICU Vital Signs Last 24 Hrs  T(C): 36.7 (22 Apr 2021 10:12), Max: 37.5 (21 Apr 2021 15:46)  T(F): 98.1 (22 Apr 2021 10:12), Max: 99.5 (21 Apr 2021 15:46)  HR: 70 (22 Apr 2021 10:12) (70 - 86)  BP: 88/52 (22 Apr 2021 10:12) (88/52 - 101/58)  BP(mean): --  ABP: --  ABP(mean): --  RR: 16 (22 Apr 2021 10:12) (16 - 20)  SpO2: --
ICU Vital Signs Last 24 Hrs  T(C): 36.4 (20 Apr 2021 06:09), Max: 36.4 (20 Apr 2021 06:09)  T(F): 97.6 (20 Apr 2021 06:09), Max: 97.6 (20 Apr 2021 06:09)  HR: 70 (20 Apr 2021 06:09) (70 - 70)  BP: 102/61 (20 Apr 2021 06:09) (102/61 - 102/61)  BP(mean): --  ABP: --  ABP(mean): --  RR: 18 (20 Apr 2021 06:09) (18 - 18)  SpO2: --
ICU Vital Signs Last 24 Hrs  T(C): 36.4 (26 Apr 2021 08:18), Max: 37.3 (25 Apr 2021 17:41)  T(F): 97.5 (26 Apr 2021 08:18), Max: 99.2 (25 Apr 2021 17:41)  HR: 77 (26 Apr 2021 08:18) (76 - 80)  BP: 86/62 (26 Apr 2021 08:18) (86/62 - 152/71)  BP(mean): --  ABP: --  ABP(mean): --  RR: 16 (26 Apr 2021 08:18) (16 - 18)  SpO2: --

## 2021-04-27 NOTE — DISCHARGE NOTE BEHAVIORAL HEALTH - NSBHDCTHERAPYFT_PSY_A_CORE
Patient was engaged in group, milieu, and supportive therapies while on the unit.  Treatment team was conducted weekly with the participating of nurse, , and providers.  Family was encouraged to speak on the phone to the patient to help with safety planning.

## 2021-04-27 NOTE — DISCHARGE NOTE BEHAVIORAL HEALTH - MEDICATION SUMMARY - MEDICATIONS TO TAKE
I will START or STAY ON the medications listed below when I get home from the hospital:    alcohol swabs   -- Apply on skin to affected area 4 times a day   -- Indication: For Diabetes mellitus    glucometer (per patient's insurance)  -- Test blood sugars four times a day. Dispense #1 glucometer.  -- Indication: For Diabetes mellitus    lancets  -- 1 application subcutaneously 4 times a day   -- Indication: For Diabetes mellitus    test strips (per patient's insurance)  -- 1 application subcutaneously 4 times a day. ** Compatible with patient's glucometer **  -- Indication: For Diabetes mellitus    traZODone 50 mg oral tablet  -- 1 tab(s) by mouth once a day (at bedtime) x 30 days   -- Indication: For Insomnia    sertraline 100 mg oral tablet  -- 1 tab(s) by mouth once a day x 30 days   -- Indication: For Adjustment disorder with depressed mood    insulin glargine 100 units/mL subcutaneous solution  -- 55 unit(s) subcutaneous once a day (at bedtime) x 30 days   -- Do not drink alcoholic beverages when taking this medication.  It is very important that you take or use this exactly as directed.  Do not skip doses or discontinue unless directed by your doctor.  Keep in refrigerator.  Do not freeze.    -- Indication: For Diabetes mellitus    insulin lispro 100 units/mL injectable solution  -- 15 unit(s) subcutaneous 3 times a day (before meals) x 30 days   -- Indication: For Diabetes mellitus    atorvastatin 40 mg oral tablet  -- 1 tab(s) by mouth once a day  -- Indication: For Dyslipedmia     melatonin 5 mg oral capsule  -- 1 cap(s) by mouth once a day (at bedtime) x 30 days   -- Do not drink alcoholic beverages when taking this medication.  Do not take this drug if you are pregnant.  May cause drowsiness.  Alcohol may intensify this effect.  Use care when operating dangerous machinery.  Obtain medical advice before taking any non-prescription drugs as some may affect the action of this medication.  This drug may impair the ability to drive or operate machinery.  Use care until you become familiar with its effects.    -- Indication: For insomnia    I will START or STAY ON the medications listed below when I get home from the hospital:    traZODone 50 mg oral tablet  -- 1 tab(s) by mouth once a day (at bedtime) x 30 days   -- Indication: For Insomnia    sertraline 100 mg oral tablet  -- 1 tab(s) by mouth once a day x 30 days   -- Indication: For Adjustment disorder with depressed mood    insulin glargine 100 units/mL subcutaneous solution  -- 55 unit(s) subcutaneous once a day (at bedtime) x 30 days   -- Do not drink alcoholic beverages when taking this medication.  It is very important that you take or use this exactly as directed.  Do not skip doses or discontinue unless directed by your doctor.  Keep in refrigerator.  Do not freeze.    -- Indication: For Diabetes mellitus    insulin lispro 100 units/mL injectable solution  -- 15 unit(s) subcutaneous 3 times a day (before meals) x 30 days   -- Indication: For Diabetes mellitus    melatonin 5 mg oral capsule  -- 1 cap(s) by mouth once a day (at bedtime) x 30 days   -- Do not drink alcoholic beverages when taking this medication.  Do not take this drug if you are pregnant.  May cause drowsiness.  Alcohol may intensify this effect.  Use care when operating dangerous machinery.  Obtain medical advice before taking any non-prescription drugs as some may affect the action of this medication.  This drug may impair the ability to drive or operate machinery.  Use care until you become familiar with its effects.    -- Indication: For insomnia

## 2021-04-27 NOTE — DISCHARGE NOTE BEHAVIORAL HEALTH - FAMILY HISTORY OF PSYCHIATRIC ILLNESS
Patient born and raised in [place] in 1960. Family fled to [place] in 1971 to escape communism. 1974 moved to [place] and 1975 moved to [place]. 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

## 2021-04-27 NOTE — DISCHARGE NOTE BEHAVIORAL HEALTH - NSBHDCMEDSFT_PSY_A_CORE
Patient was started on Zoloft titrated up to 100mg (last increased on 4/20). He was also started on Trazodone 50mg, melatonin 5mg for sleep. Patient demonstrated improvement in mood, motivation, suicidal ideations and anhedonia with above regimen. He continues to complain of sleep difficulties, however this appeared to be in the context of poor sleep hygiene.

## 2021-04-27 NOTE — DISCHARGE NOTE BEHAVIORAL HEALTH - MEDICATION SUMMARY - MEDICATIONS TO STOP TAKING
I will STOP taking the medications listed below when I get home from the hospital:    amoxicillin-clavulanate 875 mg-125 mg oral tablet  -- 1 tab(s) by mouth every 12 hours for 8 days

## 2021-04-27 NOTE — DISCHARGE NOTE BEHAVIORAL HEALTH - NSBHDCADDR2FT_A_CORE
[address] [facility] [address] [address], [place] [address]

## 2021-04-27 NOTE — DISCHARGE NOTE BEHAVIORAL HEALTH - NSBHDCMEDICALFT_PSY_A_CORE
Left pubic abscess: s/p debridement on 4/8, 410, 4/12 debridement with primary closure  also completed 8 day course of Augmentin 875 mg q12h   HLD: Atorvastatin 40mg po qhs   DM 2: Lantus 55 units qhs, lispro 15 units TID, sliding scale

## 2021-04-28 LAB
GLUCOSE BLDC GLUCOMTR-MCNC: 122 MG/DL — HIGH (ref 70–99)
GLUCOSE BLDC GLUCOMTR-MCNC: 219 MG/DL — HIGH (ref 70–99)
GLUCOSE BLDC GLUCOMTR-MCNC: 69 MG/DL — LOW (ref 70–99)

## 2021-04-28 PROCEDURE — 99231 SBSQ HOSP IP/OBS SF/LOW 25: CPT

## 2021-04-28 RX ADMIN — ATORVASTATIN CALCIUM 40 MILLIGRAM(S): 80 TABLET, FILM COATED ORAL at 20:31

## 2021-04-28 RX ADMIN — Medication 1: at 16:55

## 2021-04-28 RX ADMIN — Medication 50 MILLIGRAM(S): at 20:31

## 2021-04-28 RX ADMIN — HEPARIN SODIUM 5000 UNIT(S): 5000 INJECTION INTRAVENOUS; SUBCUTANEOUS at 22:43

## 2021-04-28 RX ADMIN — HEPARIN SODIUM 5000 UNIT(S): 5000 INJECTION INTRAVENOUS; SUBCUTANEOUS at 05:57

## 2021-04-28 RX ADMIN — INSULIN GLARGINE 55 UNIT(S): 100 INJECTION, SOLUTION SUBCUTANEOUS at 20:31

## 2021-04-28 RX ADMIN — HEPARIN SODIUM 5000 UNIT(S): 5000 INJECTION INTRAVENOUS; SUBCUTANEOUS at 15:19

## 2021-04-28 RX ADMIN — Medication 15 UNIT(S): at 11:10

## 2021-04-28 RX ADMIN — Medication 5 MILLIGRAM(S): at 20:31

## 2021-04-28 RX ADMIN — Medication 2: at 11:11

## 2021-04-28 RX ADMIN — Medication 15 UNIT(S): at 16:54

## 2021-04-28 RX ADMIN — SERTRALINE 100 MILLIGRAM(S): 25 TABLET, FILM COATED ORAL at 08:18

## 2021-04-28 NOTE — PROGRESS NOTE BEHAVIORAL HEALTH - NSBHATTESTSEENBY_PSY_A_CORE
attending Psychiatrist without NP/Trainee
Attending Psychiatrist supervising NP/Trainee, meeting pt...
attending Psychiatrist without NP/Trainee
attending Psychiatrist without NP/Trainee
Attending Psychiatrist supervising NP/Trainee, meeting pt...

## 2021-04-28 NOTE — PROGRESS NOTE BEHAVIORAL HEALTH - NSBHADMITIPOBSFT_PSY_A_CORE
as indicated
as clinically indicated
as indicated
as indicated
safety
as clinically indicated
as clinically indicated
as indicated

## 2021-04-28 NOTE — PROGRESS NOTE BEHAVIORAL HEALTH - PRIMARY DX
Adjustment disorder with depressed mood
Depression
Adjustment disorder with depressed mood

## 2021-04-28 NOTE — PROGRESS NOTE BEHAVIORAL HEALTH - NS ED BHA MSE GENERAL APPEARANCE
No deformities present

## 2021-04-28 NOTE — PROGRESS NOTE BEHAVIORAL HEALTH - AXIS III
DM, HLD, CKD 3, morbid obesity, recent DKA

## 2021-04-28 NOTE — PROGRESS NOTE BEHAVIORAL HEALTH - AFFECT CONGRUENCE
Congruent
Not congruent
Congruent
Not congruent
Congruent
Not congruent
Not congruent

## 2021-04-28 NOTE — PROGRESS NOTE BEHAVIORAL HEALTH - NSBHLEGALSTATUS_PSY_A_CORE
9.27 (2PC)
9.39 (Emergency)
9.27 (2PC)

## 2021-04-28 NOTE — PROGRESS NOTE BEHAVIORAL HEALTH - NSBHPTASSESSDT_PSY_A_CORE
17-Apr-2021 14:53
27-Apr-2021 10:32
26-Apr-2021 13:03
28-Apr-2021 14:46
21-Apr-2021 15:07
20-Apr-2021 14:48
19-Apr-2021 11:51
23-Apr-2021 12:42
18-Apr-2021 18:09
16-Apr-2021 11:12
22-Apr-2021 15:34

## 2021-04-28 NOTE — PROGRESS NOTE BEHAVIORAL HEALTH - NSBHFUPINTERVALCCFT_PSY_A_CORE
"I was not suicidal".
"I'm doing better."
"I'm good."
Pt's d/c today cancelled and rescheduled because of logistical issues with mediation and walker being obtained.  No s/h ideation or psychosis
"this bed is too small for me."
"i'm better"
"I am feeling depress ".
Pt seen in treatment team meeting. No s/h ideation or overt psychosis. Mood has significantly improved. Pt is tentatively scheduled for d/c next week to [facility]. Continue wound care; will determine if pt needs continued nursing care as outpatient
"I don't know why I'm here; I don't want to be with all these crazy people."
"the bed is too small"
Pt's mood is improving. He is out of his room more, and is less somatic.  No c/o sleep issues.  Using walker without difficulty.  [facility] is interested in possibly accepting him; will continue placement efforts. No psychosis or s/h ideation

## 2021-04-29 VITALS
HEART RATE: 61 BPM | TEMPERATURE: 98 F | SYSTOLIC BLOOD PRESSURE: 100 MMHG | DIASTOLIC BLOOD PRESSURE: 52 MMHG | RESPIRATION RATE: 16 BRPM

## 2021-04-29 LAB
GLUCOSE BLDC GLUCOMTR-MCNC: 126 MG/DL — HIGH (ref 70–99)
GLUCOSE BLDC GLUCOMTR-MCNC: 169 MG/DL — HIGH (ref 70–99)
GLUCOSE BLDC GLUCOMTR-MCNC: 206 MG/DL — HIGH (ref 70–99)

## 2021-04-29 PROCEDURE — 99238 HOSP IP/OBS DSCHRG MGMT 30/<: CPT

## 2021-04-29 RX ORDER — ATORVASTATIN CALCIUM 80 MG/1
1 TABLET, FILM COATED ORAL
Qty: 30 | Refills: 0
Start: 2021-04-29 | End: 2021-05-28

## 2021-04-29 RX ORDER — ISOPROPYL ALCOHOL, BENZOCAINE .7; .06 ML/ML; ML/ML
1 SWAB TOPICAL
Qty: 100 | Refills: 1
Start: 2021-04-29 | End: 2021-06-17

## 2021-04-29 RX ADMIN — SERTRALINE 100 MILLIGRAM(S): 25 TABLET, FILM COATED ORAL at 08:59

## 2021-04-29 RX ADMIN — Medication 15 UNIT(S): at 11:40

## 2021-04-29 RX ADMIN — HEPARIN SODIUM 5000 UNIT(S): 5000 INJECTION INTRAVENOUS; SUBCUTANEOUS at 06:42

## 2021-04-29 RX ADMIN — Medication 15 UNIT(S): at 07:39

## 2021-04-29 RX ADMIN — Medication 2: at 07:39

## 2021-04-29 NOTE — CHART NOTE - NSCHARTNOTESELECT_GEN_ALL_CORE
Discharge/Event Note
Event Note
Attending Note/Event Note
Event Note
Surgery PA

## 2021-04-29 NOTE — CHART NOTE - NSCHARTNOTEFT_GEN_A_CORE
Called to evaluate pt wound. There is a large wound extending from suprapubic to b/l inguinal area. There is no wound drainage, swelling, or erythema. A large portion of the wound is still open with staples in place. The b/l wound edges appear to be closing. Wound covered with dsd. Recommend to continue the current local wound care. Reassess for staple removal in 4-7 days.
Improvement in mood symptoms noted; pursuing placement at Adult Home. Continue meds as prescribed
Pt due for 30units of lispro with fingerstick of 197. Instructed RN to hold 30 units. Will place one time order for 15 units of lispro to be given this AM.
Pt is a 59 yo M PMH DM POD 15 s/p debridement with primary closure of pubic wound.   Was called by RN to come evaluate patient for possible staple removal.     Last note from Burn on 4/15: f/u in clinic 1 week of discharge. Dry dressing over incision site secured with tape    Afebrile  VSS.  Wound with dry dressing overlying  Wound with overlying staples. Some areas of poor wound healing. Outter edges healing better than inner edges.   No erythema, edema, discharge. No ttp. No LAD or warmth     A/P:   No evidence of infection, however patient has poor wound healing  As per Dr. Pascual, patient is to be discharged tomorrow     Spoke with Burn PA [phone] who instructed for patient to f/u outpatient in clinic.   Please have patient f[facility]u at [facility] at [address] on Thursday, 4/29. Clinic is held every tuesday and thursday. To make an appointment please call [phone].
Social Work Discharge Note:    Patient is for discharge today.   He is alert and oriented x3.  Mood is improved.  Anxiety has decreased.  Insight and judgment have improved.  Suicidal / homicidal ideation denied.      [facility]  Patient will meet with Dr Costa and ASHLEY Sheehna today when he arrives at [facility] for evaluation and intake. Patient is aware and agreeable to same.      Discharge huddle was held prior to discharge to discuss discharge plan and referral for continued mental health treatment in outpatient setting.  No safety concerns were verbalized at that time.      Patient is aware and agreeable to discharge today.
Social Work Note     Patient remains on unit for continued treatment safety and observation. Patient is in compliance with treatment and unit protocol. Patient reports he is feeling good and is hopeful for the future. Patient denies SI HI and AVH. Patient contracts for safety on and off unit. Patient is AOx3, ADLs WNL. This worker met with patient to discuss discharge plan. Patient has been accepted into [facility]. Patient will be serviced at the adult home by psychiatrist Dr Costa and NP Marko Butler. Patient was anticipated for discharge today, discharged postponed until April 29, 2021 for walker and diabetes testing material delivery. Patient is slated for discharge tomorrow  providing no regressive behaviors and or medical compilations.  Patient is aware and agreeable to same.        Mental Status Exam:    Mood – Neutral  Sleep - Good  Appetite - Good  ADLs - WNL  Thought Process – Linear    Observation – [id number]    No barriers to discharge identified at this time.
Social Work Note     Patient remains on unit for continued treatment safety and observation. Patient is in compliance with treatment and unit protocol. Patient reports he is feeling good and is hopeful for the future. Patient denies SI HI and AVH. Patient contracts for safety on and off unit. Patient is AOx3, ADLs WNL. This worker met with patient to discuss discharge plan. Patient has been accepted into [facility]. Patient will be serviced at the adult home by psychiatrist Dr Costa and NP Marko Butler. Patient has an anticipated discharge date of April 28, 2021 providing no regressive behaviors and or medical compilations.  Patient is aware and agreeable to same.        Mental Status Exam:    Mood – Neutral  Sleep - Good  Appetite - Good  ADLs - WNL  Thought Process – Linear    Observation – [id number]    No barriers to discharge identified at this time.
Social Work Note     Patient remains on unit for continued treatment safety and observation. Patient is visible on unit and compliant with treatment as well as unit protocol. Writer meets with patient daily on rounds and or team meeting. Writer provides support and education to the patient daily. Patient to remain on unit until cleared by attending for discharge. Patient denies suicidal ideation, homicidal ideation and presents with no evidence of audio visual hallucinations. Patient is in compliance with treatment and unit protocol. Patient reports he is feeling good and is hopeful for the future.     This worker met with patient to discuss discharge plan. Patient has been accepted into [facility]. Patient will be serviced at the adult home by psychiatrist Dr Costa and NP Marko Butler. Patient has an anticipated discharge date of April 28, 2021 providing no regressive behaviors and or medical compilations.  Patient is aware and agreeable to same.      Mental Status Exam:    Mood – Neutral  Sleep - Good  Appetite - Good  ADLs - WNL  Thought Process – Linear    Observation – [id number]    No barriers to discharge identified at this time.
Social Work Note:    Treatment team met with patient to discuss treatment plan, medications and discharge plan.  Patient remains with feelings of hopelessness and helplessness regarding his housing situation, medical complications and his desire to remain independent however patient remains adamant that he is not suicidal. Patient continues to report he was misunderstood while on medicine and has no desire whatsoever to end his life. Writer contacted Allan from [facility] to see if pt is a viable candidate for Adult Home. Allan met with Pt and will investigate. Patient will consider Adult Home. Discharge planning ensues.        Mental Status Exam:    Mood – Low  Sleep - WNL  Appetite - WNL  ADLs - WNL  Thought Process – Linear   Observation – Q10
-pt FS 75 this am , will decrease lantus to 55 units from 70 units  -pt has been getting 10-15 units of lispro before meals and sugar well controlled will decrease lispro to 15 from 30 and monitor FS tomorrow
Called by WILFRID Jessica for clarification on wound care.  According to Burn PA note and Medicine Hospitalist consult note - wound care should be abd pad with tape daily.  Staples placed by burn team post debridement refer to note 4/12. Patient should have staples tentatively removed by 4/22  for re-evaluation  RN TO MONITOR WOUND AND CONTINUE WOUND CARE as per orders.
Pt is not psychotic; no s/h ideation.  No medical c/o.    Pt is quantiferon positive; negative chest xray . No sx or hx of TB.  He should have medical f/u to assess if any pulmonary f/u or tx is warranted.
Social Work Admit Note:    Patient is 60 years of age male who was admitted from the medical floor due to making suicidal comments. Chart review reveals while on medicine for ketoacidosis stated in stated in sum and substance that he ingested all that sugar to end his life. Patient refuses to speak with writer stating that this is all a big mistake and he is not suicidal. Chart review reveals patient has a number of social stressors that may be contributing to his mood. In the community patient is homeless and struggling financially. Patient also as cooccurring medical condition that requires constant follow up which leads to feelings of hopelessness and helplessness. Writer spoke with cousin and conforms patient does not have any history of psychiatric disorder and that his statements were just out of frustration.  Patient admitted to unit emergency status for treatment safety and observation.        Sexual History – RAISSA. 	  Family relationships and history – Pt cousin is actively involved.    Leisure Activity Assessment – RAISSA.       Community Supports – No known attendance in any self- help groups or other organizations.   Employment – Patient is not employed at this time.   Substance Use Assessment – RAISSA.     History of suicidality or self- injurious behaviors – RAISSA.      Significant Loses – RAISSA.    Life Goals – RAISSA.       will continue to meet with patient 1:1 and with treatment team daily.      Discharge plan is for continued mental health treatment in outpatient setting.      Please refer to Social Work Psychosocial for additional information.

## 2021-05-04 ENCOUNTER — APPOINTMENT (OUTPATIENT)
Dept: BURN CARE | Facility: CLINIC | Age: 61
End: 2021-05-04
Payer: MEDICAID

## 2021-05-04 ENCOUNTER — OUTPATIENT (OUTPATIENT)
Dept: OUTPATIENT SERVICES | Facility: HOSPITAL | Age: 61
LOS: 1 days | Discharge: HOME | End: 2021-05-04

## 2021-05-04 DIAGNOSIS — E11.22 TYPE 2 DIABETES MELLITUS WITH DIABETIC CHRONIC KIDNEY DISEASE: ICD-10-CM

## 2021-05-04 DIAGNOSIS — N18.30 CHRONIC KIDNEY DISEASE, STAGE 3 UNSPECIFIED: ICD-10-CM

## 2021-05-04 DIAGNOSIS — Z79.4 LONG TERM (CURRENT) USE OF INSULIN: ICD-10-CM

## 2021-05-04 DIAGNOSIS — E66.01 MORBID (SEVERE) OBESITY DUE TO EXCESS CALORIES: ICD-10-CM

## 2021-05-04 DIAGNOSIS — L02.215 CUTANEOUS ABSCESS OF PERINEUM: ICD-10-CM

## 2021-05-04 DIAGNOSIS — E11.319 TYPE 2 DIABETES MELLITUS WITH UNSPECIFIED DIABETIC RETINOPATHY WITHOUT MACULAR EDEMA: ICD-10-CM

## 2021-05-04 DIAGNOSIS — E83.42 HYPOMAGNESEMIA: ICD-10-CM

## 2021-05-04 DIAGNOSIS — F32.9 MAJOR DEPRESSIVE DISORDER, SINGLE EPISODE, UNSPECIFIED: ICD-10-CM

## 2021-05-04 DIAGNOSIS — R26.81 UNSTEADINESS ON FEET: ICD-10-CM

## 2021-05-04 DIAGNOSIS — E78.5 HYPERLIPIDEMIA, UNSPECIFIED: ICD-10-CM

## 2021-05-04 DIAGNOSIS — E11.40 TYPE 2 DIABETES MELLITUS WITH DIABETIC NEUROPATHY, UNSPECIFIED: ICD-10-CM

## 2021-05-04 DIAGNOSIS — F43.21 ADJUSTMENT DISORDER WITH DEPRESSED MOOD: ICD-10-CM

## 2021-05-04 DIAGNOSIS — I12.9 HYPERTENSIVE CHRONIC KIDNEY DISEASE WITH STAGE 1 THROUGH STAGE 4 CHRONIC KIDNEY DISEASE, OR UNSPECIFIED CHRONIC KIDNEY DISEASE: ICD-10-CM

## 2021-05-04 PROCEDURE — 99213 OFFICE O/P EST LOW 20 MIN: CPT | Mod: 25

## 2021-05-04 PROCEDURE — 97597 DBRDMT OPN WND 1ST 20 CM/<: CPT

## 2021-05-05 LAB
CULTURE RESULTS: SIGNIFICANT CHANGE UP
SPECIMEN SOURCE: SIGNIFICANT CHANGE UP

## 2021-05-10 ENCOUNTER — RX RENEWAL (OUTPATIENT)
Age: 61
End: 2021-05-10

## 2021-05-11 ENCOUNTER — APPOINTMENT (OUTPATIENT)
Dept: OPHTHALMOLOGY | Facility: CLINIC | Age: 61
End: 2021-05-11

## 2021-05-19 LAB
CULTURE RESULTS: SIGNIFICANT CHANGE UP
SPECIMEN SOURCE: SIGNIFICANT CHANGE UP

## 2021-05-25 ENCOUNTER — APPOINTMENT (OUTPATIENT)
Dept: OPHTHALMOLOGY | Facility: CLINIC | Age: 61
End: 2021-05-25

## 2021-05-25 ENCOUNTER — OUTPATIENT (OUTPATIENT)
Dept: OUTPATIENT SERVICES | Facility: HOSPITAL | Age: 61
LOS: 1 days | Discharge: HOME | End: 2021-05-25
Payer: MEDICAID

## 2021-05-25 PROCEDURE — 92134 CPTRZ OPH DX IMG PST SGM RTA: CPT | Mod: 26

## 2021-05-25 PROCEDURE — 92014 COMPRE OPH EXAM EST PT 1/>: CPT

## 2021-05-27 DIAGNOSIS — H43.391 OTHER VITREOUS OPACITIES, RIGHT EYE: ICD-10-CM

## 2021-05-27 DIAGNOSIS — E11.3293 TYPE 2 DIABETES MELLITUS WITH MILD NONPROLIFERATIVE DIABETIC RETINOPATHY WITHOUT MACULAR EDEMA, BILATERAL: ICD-10-CM

## 2021-05-27 DIAGNOSIS — H35.039 HYPERTENSIVE RETINOPATHY, UNSPECIFIED EYE: ICD-10-CM

## 2021-05-27 DIAGNOSIS — H25.813 COMBINED FORMS OF AGE-RELATED CATARACT, BILATERAL: ICD-10-CM

## 2021-05-27 DIAGNOSIS — D23.10 OTHER BENIGN NEOPLASM OF SKIN OF UNSPECIFIED EYELID, INCLUDING CANTHUS: ICD-10-CM

## 2021-06-02 LAB
CULTURE RESULTS: SIGNIFICANT CHANGE UP
SPECIMEN SOURCE: SIGNIFICANT CHANGE UP

## 2021-06-11 ENCOUNTER — OUTPATIENT (OUTPATIENT)
Dept: OUTPATIENT SERVICES | Facility: HOSPITAL | Age: 61
LOS: 1 days | Discharge: HOME | End: 2021-06-11
Payer: MEDICAID

## 2021-06-11 ENCOUNTER — APPOINTMENT (OUTPATIENT)
Dept: OPHTHALMOLOGY | Facility: CLINIC | Age: 61
End: 2021-06-11

## 2021-06-11 PROCEDURE — 92012 INTRM OPH EXAM EST PATIENT: CPT

## 2021-10-27 NOTE — PROGRESS NOTE BEHAVIORAL HEALTH - BEHAVIOR
[address] [facility] CARE  [address] [address]  [address] [address]  Dept: [phone]  Dept Fax: [phone]  Loc: [phone]    Vincent Pablo is a 50 y.o. female who presents today for her medical conditions/complaints as noted below. Vincent Pablo is c/o of Other (huge knot under arm sore)        HPI:     HPI   Chief Complaint   Patient presents with    Other     huge knot under arm sore     Patient presents today for evaluation of \"knot\" under left arm. She states it has drainage at times. She states this has been present for years and continues to reoccur. She is able to apply warm compress which does help facilitate drainage. It is often painful. She denies fever.      Past Medical History:   Diagnosis Date    PVC (premature ventricular contraction)     Restless leg syndrome 7/16/2018    Uterine mass 6/17/14    9x9 cm on CT scan      Past Surgical History:   Procedure Laterality Date    BREAST RECONSTRUCTION  04/2019    BREAST SURGERY Bilateral 9617    silicone Memory Gel    CHOLECYSTECTOMY  6/24/14    HC INJECT OTHER PERPHRL NERV Right 8/3/2016    HIP FLUOROSCOPIC GUIDED CORTICOSTEROID INJECTION  performed by Merlin Ko, MD at [address] [place] NERV Right 4/21/2017    FLURO GUIDED HIP INJECTION performed by Tez Rivero MD at [address] [place] [address] 10/27/2021 10/26/2021 8/17/2021 8/3/2021 7/20/2021 67/22/6901   SYSTOLIC 072 743 839 217 173 013   DIASTOLIC 68 80 64 78 68 74   Site - - - - - -   Position - - - - - -   Pulse 54 50 53 56 62 56   Temp 96.6 - 97.2 97.4 98.8 -   Resp - - - - - -   SpO2 98 98 98 100 98 -   Weight 131 lb 132 lb 144 lb 12.8 oz 134 lb 134 lb 9.6 oz 129 lb   Height 5' 9\" 5' 9\" 5' 9\" 5' 9\" 5' 9\" 5' 9\"   Body mass index 19.34 kg/m2 19.49 kg/m2 21.38 kg/m2 19.79 kg/m2 19.87 kg/m2 19.05 kg/m2   Some recent data might be hidden       Family History   Problem Relation Age of Onset    Cancer Brother         synovial cell sarcoma    Cancer Maternal Grandfather         lung    Other Daughter         gallbladder disease    Hypertension Mother        Social History     Tobacco Use    Smoking status: Current Every Day Smoker     Packs/day: 0.50     Years: 20.00     Pack years: 10.00     Types: Cigarettes    Smokeless tobacco: Never Used   Substance Use Topics    Alcohol use: Yes     Comment: occ      Current Outpatient Medications on File Prior to Visit   Medication Sig Dispense Refill    rOPINIRole (REQUIP) 0.5 MG tablet TAKE 1-3 TABLETS BY MOUTH EVERY NIGHT 90 tablet 5    sertraline (ZOLOFT) 50 MG tablet Take 1 tablet by mouth daily Take 1.5 tablet by mouth daily 45 tablet 5    vitamin D (ERGOCALCIFEROL) 1.25 MG (81687 UT) CAPS capsule Take 1 capsule by mouth once a week 12 capsule 1    omeprazole (PRILOSEC) 20 MG delayed release capsule Take 1 capsule by mouth 2 times daily (before meals) 60 capsule 5    atenolol (TENORMIN) 25 MG tablet TAKE 1 TABLET BY MOUTH TWICE DAILY (Patient taking differently: daily TAKE 1 TABLET BY MOUTH TWICE DAILY) 180 tablet 3    Multiple Vitamin (MULTI-VITAMIN DAILY PO) Take by mouth      hydrochlorothiazide (HYDRODIURIL) 25 MG tablet Take 1 tablet by mouth daily (Patient taking differently: Take 25 mg by mouth as needed ) 30 tablet 5    valACYclovir (VALTREX) 1 g tablet TAKE 1 TABLET BY MOUTH EVERY DAY AS NEEDED FOR FEVER BLISTER (Patient not taking: Reported on 10/27/2021) 30 tablet 0    doxepin (SINEQUAN) 10 MG capsule Take 1 capsule by mouth nightly (Patient not taking: Reported on 10/27/2021) 30 capsule 0     No current facility-administered medications on file prior to visit.      Allergies   Allergen Reactions    Percocet [Oxycodone-Acetaminophen] Itching    Codeine     Toradol [Ketorolac Tromethamine] Itching       Health Maintenance   Topic Date Due    Hepatitis C screen  Never done    COVID-19 Vaccine (1) Never done    DTaP/Tdap/Td vaccine (1 - Tdap) Never done    Colon cancer screen colonoscopy  Never done    Breast cancer screen  02/19/2020    Flu vaccine (1) Never done    Pneumococcal 0-64 years Vaccine (1 of 2 - PPSV23) 07/15/2024 (Originally 8/1/1979)    Potassium monitoring  08/03/2022    Creatinine monitoring  08/03/2022    Lipid screen  08/04/2026    Hepatitis A vaccine  Aged Out    Hepatitis B vaccine  Aged Out    Hib vaccine  Aged Out    Meningococcal (ACWY) vaccine  Aged Out    HIV screen  Discontinued       Subjective:      Review of Systems    Objective:     Physical Exam  Vitals and nursing note reviewed. Constitutional:       Appearance: She is well-developed. HENT:      Head: Atraumatic. Right Ear: External ear normal.      Left Ear: External ear normal.      Nose: Nose normal.   Eyes:      Conjunctiva/sclera: Conjunctivae normal.      Pupils: Pupils are equal, round, and reactive to light. Cardiovascular:      Rate and Rhythm: Normal rate and regular rhythm. Heart sounds: Normal heart sounds, S1 normal and S2 normal.   Pulmonary:      Effort: Pulmonary effort is normal.      Breath sounds: Normal breath sounds. Abdominal:      General: Bowel sounds are normal.      Palpations: Abdomen is soft. Musculoskeletal:         General: Normal range of motion. Cervical back: Normal range of motion and neck supple. Skin:     General: Skin is warm and dry. Comments: approx 1.2 cm left axillary mass; solid and raised; no drainage. Mild erythema. Neurological:      Mental Status: She is alert and oriented to person, place, and time. Psychiatric:         Behavior: Behavior normal.       /68   Pulse 54   Temp 96.6 °F (35.9 °C) (Temporal)   Ht 5' 9\" (1.753 m)   Wt 131 lb (59.4 kg)   LMP 06/08/2014   SpO2 98%   BMI 19.35 kg/m²     Assessment:       Diagnosis Orders   1.  Abscess of left axilla  Lou Byrd PA-C, General Surgery, [place]         Plan:     Warm compresses; keflex as directed. Referral to general surgery. PDMP Monitoring:    Last PDMP [place] as Reviewed [facility]):  Review User Review Instant Review Result            Urine Drug Screenings (1 yr)    No resulted procedures found. Medication Contract and Consent for Opioid Use Documents Filed     Patient Documents       Type of Document Status Date Received Received By Description     Medication Contract Received 6/21/2017 11:09 AM Oval Josefa                  Patient given educational materials -see patient instructions. Discussed use, benefit, and side effects of prescribed medications. All patient questions answered. Pt voiced understanding. Reviewed health maintenance. Instructed to continue currentmedications, diet and exercise. Patient agreed with treatment plan. Follow up as directed. MEDICATIONS:  Orders Placed This Encounter   Medications    cephALEXin (KEFLEX) 500 MG capsule     Sig: Take 1 capsule by mouth 4 times daily     Dispense:  40 capsule     Refill:  0         ORDERS:  Orders Placed This Encounter   Procedures   [address] [facility], DAYANA Jasmine, General Surgery, [place]       Follow-up:  Return if symptoms worsen or fail to improve. PATIENT INSTRUCTIONS:  There are no Patient Instructions on file for this visit. Electronically signed by WHITNEY Barr on 10/27/2021 at 12:16 PM    EMR Dragon/transcription disclaimer:  Much of thisencounter note is electronic transcription/translation of spoken language to printed texts. The electronic translation of spoken language may be erroneous, or at times, nonsensical words or phrases may be inadvertentlytranscribed.   Although I have reviewed the note for such errors, some may still exist. Cooperative

## 2021-11-04 ENCOUNTER — APPOINTMENT (OUTPATIENT)
Dept: ENDOCRINOLOGY | Facility: CLINIC | Age: 61
End: 2021-11-04

## 2021-11-04 ENCOUNTER — OUTPATIENT (OUTPATIENT)
Dept: OUTPATIENT SERVICES | Facility: HOSPITAL | Age: 61
LOS: 1 days | Discharge: HOME | End: 2021-11-04

## 2021-11-04 DIAGNOSIS — E11.22 TYPE 2 DIABETES MELLITUS WITH DIABETIC CHRONIC KIDNEY DISEASE: ICD-10-CM

## 2021-11-04 DIAGNOSIS — E11.40 TYPE 2 DIABETES MELLITUS WITH DIABETIC NEUROPATHY, UNSPECIFIED: ICD-10-CM

## 2021-11-04 DIAGNOSIS — E66.01 MORBID (SEVERE) OBESITY DUE TO EXCESS CALORIES: ICD-10-CM

## 2021-11-04 NOTE — HISTORY OF PRESENT ILLNESS
[FreeTextEntry1] : 61 year old male with past medical history of HTN and DM presents for follow up. Patient reports that he is living in the assisted living and is getting only 40 units of long acting insulin and 15 with meals his sugars are ranging from 200-400. He denies any fevers, chills, rigors, chest pain, abdominal pain or any other complaints.

## 2021-11-04 NOTE — REVIEW OF SYSTEMS
[Negative] : Integumentary [Fatigue] : no fatigue [Decreased Appetite] : appetite not decreased [Dysphagia] : no dysphagia [Chest Pain] : no chest pain [Palpitations] : no palpitations [Shortness Of Breath] : no shortness of breath [Cough] : no cough [Headaches] : no headaches [Tremors] : no tremors [Polydipsia] : no polydipsia

## 2021-11-04 NOTE — ASSESSMENT
[FreeTextEntry1] : 61 year old male with past medical history of HTN and DM presents for follow up.\par \par # DM II\par - Will send prescription for Steglatro 15\par - Will continue with Insulin but reduce it to 20 units at bedtime\par - Will continue with Ozempic\par - Will continue with Pioglitazone\par \par # Diabetic Neuropathy\par - Will prescribe Gabapentin 600 three times a day\par \par # Morbid Obesity\par - Weight loss advised\par \par Follow up in 3 months and PRN [Diabetes Foot Care] : diabetes foot care [Long Term Vascular Complications] : long term vascular complications of diabetes [Carbohydrate Consistent Diet] : carbohydrate consistent diet [Importance of Diet and Exercise] : importance of diet and exercise to improve glycemic control, achieve weight loss and improve cardiovascular health [Hypoglycemia Management] : hypoglycemia management [Retinopathy Screening] : Patient was referred to ophthalmology for retinopathy screening

## 2021-11-18 NOTE — ED ADULT NURSE NOTE - CAS DISCH TRANSFER METHOD
Physical Therapy  Cancellation/No-show Note  Patient Name:  Merrick Ross  :  1981   Date:  2021  Cancelled visits to date: 0  No-shows to date: 1    For today's appointment patient:  []  Cancelled  []  Rescheduled appointment  [x]  No-show     Reason given by patient:  []  Patient ill  []  Conflicting appointment  []  No transportation    []  Conflict with work  [x]  No reason given  []  Other:     Comments:   Patient did not show for today's session.     Electronically signed by:  Mary Davison PT,
Private car

## 2021-11-23 RX ORDER — ERTUGLIFLOZIN 15 MG/1
15 TABLET, FILM COATED ORAL
Qty: 30 | Refills: 5 | Status: DISCONTINUED | COMMUNITY
Start: 2020-02-08 | End: 2021-11-23

## 2021-11-25 RX ORDER — SEMAGLUTIDE 1.34 MG/ML
4 INJECTION, SOLUTION SUBCUTANEOUS
Qty: 1 | Refills: 5 | Status: ACTIVE | COMMUNITY
Start: 2018-12-05 | End: 1900-01-01

## 2021-11-25 RX ORDER — DAPAGLIFLOZIN 10 MG/1
10 TABLET, FILM COATED ORAL
Qty: 30 | Refills: 5 | Status: ACTIVE | COMMUNITY
Start: 2021-11-23 | End: 1900-01-01

## 2022-01-04 ENCOUNTER — OUTPATIENT (OUTPATIENT)
Dept: OUTPATIENT SERVICES | Facility: HOSPITAL | Age: 62
LOS: 1 days | Discharge: HOME | End: 2022-01-04
Payer: MEDICAID

## 2022-01-04 ENCOUNTER — APPOINTMENT (OUTPATIENT)
Dept: OPHTHALMOLOGY | Facility: CLINIC | Age: 62
End: 2022-01-04

## 2022-01-04 PROCEDURE — 92012 INTRM OPH EXAM EST PATIENT: CPT

## 2022-01-14 DIAGNOSIS — H25.13 AGE-RELATED NUCLEAR CATARACT, BILATERAL: ICD-10-CM

## 2022-01-14 DIAGNOSIS — H43.391 OTHER VITREOUS OPACITIES, RIGHT EYE: ICD-10-CM

## 2022-01-14 DIAGNOSIS — H00.15 CHALAZION LEFT LOWER EYELID: ICD-10-CM

## 2022-01-19 RX ORDER — INSULIN DEGLUDEC INJECTION 200 U/ML
200 INJECTION, SOLUTION SUBCUTANEOUS DAILY
Qty: 1 | Refills: 5 | Status: ACTIVE | COMMUNITY
Start: 2018-01-02 | End: 1900-01-01

## 2022-02-03 ENCOUNTER — APPOINTMENT (OUTPATIENT)
Dept: ENDOCRINOLOGY | Facility: CLINIC | Age: 62
End: 2022-02-03

## 2022-03-17 NOTE — ED ADULT NURSE NOTE - DRUG PRE-SCREENING (DAST -1)
Discharge Planning Update:    FEES completed, reg diet with thin liquids recommended. MBS today. ENT eval pending. PT/OT. Plans return home with wife.   Electronically signed by Vinicio Orona RN on 3/17/2022 at 9:07 AM Statement Selected

## 2022-03-22 ENCOUNTER — OUTPATIENT (OUTPATIENT)
Dept: OUTPATIENT SERVICES | Facility: HOSPITAL | Age: 62
LOS: 1 days | Discharge: HOME | End: 2022-03-22
Payer: MEDICAID

## 2022-03-22 ENCOUNTER — APPOINTMENT (OUTPATIENT)
Dept: OPHTHALMOLOGY | Facility: CLINIC | Age: 62
End: 2022-03-22

## 2022-03-22 PROCEDURE — 92012 INTRM OPH EXAM EST PATIENT: CPT

## 2022-03-31 DIAGNOSIS — H43.391 OTHER VITREOUS OPACITIES, RIGHT EYE: ICD-10-CM

## 2022-03-31 DIAGNOSIS — H25.13 AGE-RELATED NUCLEAR CATARACT, BILATERAL: ICD-10-CM

## 2022-03-31 DIAGNOSIS — H00.15 CHALAZION LEFT LOWER EYELID: ICD-10-CM

## 2022-04-10 NOTE — ED PROVIDER NOTE - CHIEF COMPLAINT
Hospital Medicine History & Physical      PCP: No primary care provider on file. Date of Admission: 4/9/2022    Date of Service: Pt seen/examined on 4/9/22 and Admitted to Inpatient with expected LOS greater than two midnights due to medical therapy     Chief Complaint: found down      History Of Present Illness:       48 y. o.female brought into the ER after being found down in a driveway. Patient is unable to recall where/how/why she fell down. She recalls attending a \"celebration of life\" with a recently  friend and believes she may have drank an excessive amount of alcohol. She denies any pain/injury, sob, chest pain, abdominal pain, n/v. She denies any illness/ symptoms prior to today's festivities. She denies suicidal ideation. Past Medical History:          Diagnosis Date    Arthritis     Bipolar disorder ([facility]     Fibromyalgia        Past Surgical History:          Procedure Laterality Date    CHOLECYSTECTOMY      COSMETIC SURGERY      left ear    HYSTERECTOMY      SEPTOPLASTY      TUBAL LIGATION         Medications Prior to Admission:      Prior to Admission medications    Medication Sig Start Date End Date Taking? Authorizing Provider   docusate sodium (COLACE) 100 MG capsule Take 100 mg by mouth 2 times daily   Yes Historical Provider, MD   losartan (COZAAR) 50 MG tablet Take 75 mg by mouth daily   Yes Historical Provider, MD   oxybutynin (DITROPAN-XL) 10 MG extended release tablet Take 10 mg by mouth daily   Yes Historical Provider, MD   QUEtiapine (SEROQUEL) 50 MG tablet Take 50 mg by mouth daily   Yes Historical Provider, MD   famotidine (PEPCID) 20 MG tablet Take 20 mg by mouth 2 times daily   Yes Historical Provider, MD   clonazePAM (KLONOPIN) 0.5 MG tablet Take 0.5 mg by mouth 2 times daily as needed.     Historical Provider, MD   sertraline (ZOLOFT) 100 MG tablet Take 100 mg by mouth daily     Historical Provider, MD   acetaminophen (APAP EXTRA STRENGTH) 500 MG tablet Take The patient is a 60y Male complaining of dental pain/injury. 04/09/2022    RBCUA 0-2 04/09/2022    BLOODU Negative 04/09/2022    SPECGRAV 1.020 04/09/2022    GLUCOSEU Negative 04/09/2022       Radiology:          XR CHEST PORTABLE   Final Result   Increased markings throughout the lungs, likely related to decreased lung   volumes. The possibility of mild edema or infiltrate is raised. CT CERVICAL SPINE WO CONTRAST   Final Result   1. No acute findings in the cervical spine. 2. Minimal to mild cervical spine degenerative changes. CT HEAD WO CONTRAST   Final Result   No acute intracranial abnormality. ASSESSMENT:    Active Hospital Problems    Diagnosis Date Noted    [facility] [G70.634] 04/09/2022    Primary hypertension [I10] 04/09/2022    Marijuana abuse [F12.10] 04/09/2022    Acute metabolic encephalopathy [C37.88] 04/09/2022    Acute alcohol intoxication with alcoholism, uncomplicated ([facility] [address] [W67.209] 04/09/2022         PLAN:    1) Hypoxemia  - cxr suggestive of pna, will treat for cap  - repeat cxr, check procalcitonin    2) tobacco abuse  - decline nicotine patch    3) EtOH  - unclear history, denies chronic abuse  - ciwa    4) Polysubstance abuse  - patient initially denies use of narcotics, no prescription for oxy, (+) on tox screen    DVT Prophylaxis: lovenox  Diet: ADULT DIET; Regular  Code Status: Full Verl MD David    Thank you No primary care provider on file. for the opportunity to be involved in this patient's care. If you have any questions or concerns please feel free to contact me at [phone].

## 2022-04-27 NOTE — PROGRESS NOTE BEHAVIORAL HEALTH - NSBHFUPTYPE_PSY_A_CORE
Patient up to bathroom for bowel movement. Patient steady on feet, denies dizziness and reports having a small bowel movement.   
Inpatient
Inpatient-On Service Note
Inpatient
Inpatient

## 2022-05-19 NOTE — ED PROVIDER NOTE - CARE PLAN
Pre Op diagnosis: right Lumbar Spondylosis. Low back pain    Post op: the same    Procedure: Percutaneous radiofrequency thermocoagulation of medial nerve branches of L2/3 and L3/4 facet joints under fluoroscopic guidance.     After informed consent was obtained and site was marked. Patient was positioned prone on the procedure table.   Low back area was prepped and draped in an strict sterile fashion.     Using oblique fluoroscopic view with 20 degree caudal tilt and 20 degree external rotation to the right areas of the medial branch nerves were identified at the junction between superior anterior process and transverse process of vertebral bodies of L2, L3, L4. After local anesthesia 1% Lidocaine, 5cc at each level 22g insulated needle with 10mm curved active tip was placed under real time fluoroscopy until it contacted the bone and the curvature of the needle \"hugged\" the curvature of the above mentioned junction. Placement of needles was confirmed on both AP and lateral fluoroscopic view clearly showing that the tip of each needle was close to the facet joint but away from neuro foramina. Placement was also confirmed by injecting small amount of Isovue dye under real time fluoroscopy clearly showing absence intravascular or epidural spread of the contrast. Total of NUMBERS [id number]:[id number]}  Needle(s) were used on the Right side. Lidocaine 2% Preservative Free 1cc was injected through each needle. At that time simultaneous thermocoagulation was performed using [employer] radiofrequency machine with 80 degree celsius temperature of the active tip and 90 second duration.   There was no paraesthesia observed or other complication observed.   Fluoroscopy time:  34 sec  Pt tolerated procedure well and was discharged home after appropriate observation.    Con Ramirez MD  5/19/2022  
Principal Discharge DX:	Diabetic ketoacidosis  Secondary Diagnosis:	Leukocytosis  Secondary Diagnosis:	Groin abscess

## 2022-11-02 NOTE — ED ADULT NURSE NOTE - MUSCULOSKELETAL ASSESSMENT
PATIENT: Vibha Pearl  MRN: [id number]  DATE: 2022      Diagnosis:   1. Chronic deep vein thrombosis (DVT) of femoral vein of left lower extremity    2. Metastasis from cervical cancer    3. Hypertension, unspecified type    4. PVD (peripheral vascular disease)        Chief Complaint: Follow-up (Chronic DVT)      Subjective:    Initial History: Ms. Pearl is a 47 y.o. female with Anemia, arthritis, CHF, fibroids, HTN, cervical cancer under the care of Dr Mensah, pulmonary emboli who presents for DVT.  The patient states she had a a pulmonary embolism around 26 years ago after she had a .  She was started on Lovenox after subsequent pregnancies.  The patient recently was seen  in the emergency room on 10/05/2022 for big toe pain.  Venous ultrasound of the lower extremities showed residual occlusive thrombus in the left common femoral vein.  Ultrasound of the lower extremity arteries showed findings concerning for proximal aortoiliac stenosis.  The patient underwent CTA abdomen 10/05/22 showing occlusion of the external iliac artery on the left in the internal iliac artery on the right.  The patient was sent to see a vascular surgeon Dr Koobehi at [facility] and had a stent placed in the RLE.  The patient was not taking Eliquis 5mg BID as she was supposed to be doing on 10/05/22 but instead taking it once daily.     Currently the patient denies bleeding.  The patient denies CP, cough, SOB, abdominal pain, N/V, constipation, diarrhea.  The patient denies fever, chills, night sweats, weight loss, new lumps or bumps, easy bruising or bleeding.    Past Medical History:   Past Medical History:   Diagnosis Date    Anemia     Arthritis     Cancer     Cervical CA, metastatic, s/p chemo, radiation, immunotherapy    CHF (congestive heart failure)     EF 25-30%  on TTE 21 -> EF 51% on stress test 21    Chronic pain     Constipation     COVID-19 2020    DVT  (deep venous thrombosis)     Fibroids     Hypertension     Ileus     PE (pulmonary thromboembolism)     SBO (small bowel obstruction)     Takotsubo cardiomyopathy        Past Surgical HIstory:   Past Surgical History:   Procedure Laterality Date     SECTION      COLONOSCOPY Left 2021    Procedure: COLONOSCOPY- did not reach cecum;  Surgeon: Anthony Minor MD;  Location: [facility] ENDO;  Service: Endoscopy;  Laterality: Left;    COLOSTOMY N/A 2021    Procedure: CREATION, COLOSTOMY, diverting loop;  Surgeon: Domingo Leyva MD;  Location: [facility] OR;  Service: General;  Laterality: N/A;    HYSTERECTOMY      INSERTION OF TUNNELED CENTRAL VENOUS CATHETER (CVC) WITH SUBCUTANEOUS PORT N/A 3/25/2019    Procedure: [id number];  Surgeon: Ni Helm MD;  Location: [facility];  Service: General;  Laterality: N/A;    LAPAROSCOPIC APPENDECTOMY N/A 2019    Procedure: APPENDECTOMY, LAPAROSCOPIC;  Surgeon: Ni Helm MD;  Location: [facility] OR;  Service: General;  Laterality: N/A;    OOPHORECTOMY      THROMBECTOMY         Family History:   Family History   Problem Relation Age of Onset    Hypertension Mother     Hyperlipidemia Mother     Arthritis Mother     Breast cancer Neg Hx     Ovarian cancer Neg Hx        Social History:  reports that she has been smoking cigarettes. She has been smoking an average of .5 packs per day. She has never used smokeless tobacco. She reports that she does not drink alcohol and does not use drugs.    Allergies:  Review of patient's allergies indicates:  No Known Allergies    Medications:  Current Outpatient Medications   Medication Sig Dispense Refill    albuterol (PROVENTIL/VENTOLIN HFA) 90 mcg/actuation inhaler Inhale 2 puffs into the lungs every 4 (four) hours as needed.      cetirizine (ZYRTEC) 10 MG tablet Take 10 mg by mouth once daily.      cholecalciferol, vitamin D3, 1,250 mcg (50,000 unit) capsule Take 50,000 Units by mouth  every 7 days.      clopidogreL (PLAVIX) 75 mg tablet Take 75 mg by mouth once daily.      cyanocobalamin 1,000 mcg/mL injection Inject 1,000 mcg into the skin every 30 days.      ipratropium-albuteroL (COMBIVENT)  mcg/actuation inhaler Inhale 1 puff into the lungs every 4 (four) hours as needed for Wheezing or Shortness of Breath. Rescue 4 g 0    Lactobacillus acidophilus (PROBIOTIC ORAL) Take 1 tablet by mouth once daily.       magnesium citrate solution Take 296 mLs by mouth as needed (constipation).       mirtazapine (REMERON) 7.5 MG Tab Take 1 tablet (7.5 mg total) by mouth every evening. 30 tablet 1    ondansetron (ZOFRAN-ODT) 4 MG TbDL Take 1 tablet (4 mg total) by mouth every 8 (eight) hours as needed. 15 tablet 0    ondansetron (ZOFRAN-ODT) 8 MG TbDL Take 8 mg by mouth every 8 (eight) hours as needed (nausea).       VITAMIN D2 1,250 mcg (50,000 unit) capsule Take 50,000 Units by mouth every 7 days.      ALPRAZolam (XANAX) 0.25 MG tablet Take 1 tablet (0.25 mg total) by mouth 4 (four) times daily as needed for Anxiety. 28 tablet 1    apixaban (ELIQUIS) 5 mg Tab Take 1 tablet (5 mg total) by mouth 2 (two) times daily. 60 tablet 6    carvediloL (COREG) 3.125 MG tablet Take 1 tablet (3.125 mg total) by mouth 2 (two) times daily. (Patient taking differently: Take 3.125 mg by mouth once daily. ) 60 tablet 0    morphine (MS CONTIN) 15 MG 12 hr tablet Take 1 tablet (15 mg total) by mouth 2 (two) times daily. for 7 days 14 tablet 0    oxyCODONE (ROXICODONE) 15 MG Tab Take 1 tablet (15 mg total) by mouth every 6 (six) hours as needed for Pain. 12 tablet 0    spironolactone (ALDACTONE) 25 MG tablet Take 1 tablet (25 mg total) by mouth once daily. (Patient not taking: Reported on 9/13/2021) 30 tablet 0     No current facility-administered medications for this visit.       Review of Systems   Constitutional:  Negative for chills, fatigue, fever and unexpected weight change.   Respiratory:  Negative  "for cough and shortness of breath.    Cardiovascular:  Negative for chest pain and palpitations.   Gastrointestinal:  Negative for abdominal pain, constipation, diarrhea, nausea and vomiting.   Skin:  Negative for rash.   Neurological:  Negative for headaches.   Hematological:  Negative for adenopathy. Does not bruise/bleed easily.     ECOG Performance Status: 1   Objective:      Vitals:   Vitals:    11/02/22 1044   BP: 110/88   BP Location: Right arm   Patient Position: Sitting   BP Method: Medium (Manual)   Pulse: 89   Temp: 98.3 °F (36.8 °C)   TempSrc: Temporal   SpO2: 96%   Weight: 62.2 kg (137 lb 2 oz)   Height: 5' 7" (1.702 m)       Physical Exam  Constitutional:       General: She is not in acute distress.     Appearance: She is well-developed. She is not diaphoretic.   HENT:      Head: Normocephalic and atraumatic.   Cardiovascular:      Rate and Rhythm: Normal rate and regular rhythm.      Heart sounds: Normal heart sounds. No murmur heard.    No friction rub. No gallop.   Pulmonary:      Effort: Pulmonary effort is normal. No respiratory distress.      Breath sounds: Normal breath sounds. No wheezing or rales.   Chest:      Chest wall: No tenderness.   Abdominal:      General: Bowel sounds are normal. There is no distension.      Palpations: Abdomen is soft. There is no mass.      Tenderness: There is no abdominal tenderness. There is no guarding or rebound.   Lymphadenopathy:      Cervical: No cervical adenopathy.      Upper Body:      Right upper body: No supraclavicular or axillary adenopathy.      Left upper body: No supraclavicular or axillary adenopathy.   Skin:     Findings: No erythema or rash.   Neurological:      Mental Status: She is alert and oriented to person, place, and time.   Psychiatric:         Behavior: Behavior normal.     Laboratory Data:  No visits with results within 1 Week(s) from this visit.   Latest known visit with results is:   Admission on 10/05/2022, Discharged on 10/05/2022 "   Component Date Value Ref Range Status    WBC 10/05/2022 7.65  3.90 - 12.70 K/uL Final    RBC 10/05/2022 4.39  4.00 - 5.40 M/uL Final    Hemoglobin 10/05/2022 14.1  12.0 - 16.0 g/dL Final    Hematocrit 10/05/2022 41.5  37.0 - 48.5 % Final    MCV 10/05/2022 95  82 - 98 fL Final    MCH 10/05/2022 32.1 (H)  27.0 - 31.0 pg Final    MCHC 10/05/2022 34.0  32.0 - 36.0 g/dL Final    RDW 10/05/2022 13.6  11.5 - 14.5 % Final    Platelets 10/05/2022 173  150 - 450 K/uL Final    MPV 10/05/2022 9.9  9.2 - 12.9 fL Final    Immature Granulocytes 10/05/2022 0.5  0.0 - 0.5 % Final    Gran # (ANC) 10/05/2022 5.6  1.8 - 7.7 K/uL Final    Immature Grans (Abs) 10/05/2022 0.04  0.00 - 0.04 K/uL Final    Comment: Mild elevation in immature granulocytes is non specific and   can be seen in a variety of conditions including stress response,   acute inflammation, trauma and pregnancy. Correlation with other   laboratory and clinical findings is essential.      Lymph # 10/05/2022 1.4  1.0 - 4.8 K/uL Final    Mono # 10/05/2022 0.4  0.3 - 1.0 K/uL Final    Eos # 10/05/2022 0.2  0.0 - 0.5 K/uL Final    Baso # 10/05/2022 0.05  0.00 - 0.20 K/uL Final    nRBC 10/05/2022 0  0 /100 WBC Final    Gran % 10/05/2022 73.8 (H)  38.0 - 73.0 % Final    Lymph % 10/05/2022 17.8 (L)  18.0 - 48.0 % Final    Mono % 10/05/2022 5.1  4.0 - 15.0 % Final    Eosinophil % 10/05/2022 2.1  0.0 - 8.0 % Final    Basophil % 10/05/2022 0.7  0.0 - 1.9 % Final    Differential Method 10/05/2022 Automated   Final    Sodium 10/05/2022 140  136 - 145 mmol/L Final    Potassium 10/05/2022 4.2  3.5 - 5.1 mmol/L Final    Chloride 10/05/2022 102  95 - 110 mmol/L Final    CO2 10/05/2022 28  22 - 31 mmol/L Final    Glucose 10/05/2022 108  70 - 110 mg/dL Final    Comment: The ADA recommends the following guidelines for fasting glucose:    Normal:       less than 100 mg/dL    Prediabetes:  100 mg/dL to 125 mg/dL    Diabetes:     126 mg/dL or higher      BUN  10/05/2022 11  7 - 18 mg/dL Final    Creatinine 10/05/2022 0.51  0.50 - 1.40 mg/dL Final    Calcium 10/05/2022 9.2  8.4 - 10.2 mg/dL Final    Total Protein 10/05/2022 7.3  6.0 - 8.4 g/dL Final    Albumin 10/05/2022 4.3  3.5 - 5.2 g/dL Final    Total Bilirubin 10/05/2022 0.3  0.2 - 1.3 mg/dL Final    Alkaline Phosphatase 10/05/2022 51  38 - 145 U/L Final    AST 10/05/2022 24  14 - 36 U/L Final    ALT 10/05/2022 14  0 - 35 U/L Final    Anion Gap 10/05/2022 10  8 - 16 mmol/L Final    eGFR 10/05/2022 >60  >60 mL/min/1.73 m^2 Final    Sed Rate 10/05/2022 21 (H)  0 - 19 mm/Hr Final    CRP 10/05/2022 1.10 (H)  0.00 - 0.90 mg/dL Final    PT 10/05/2022 14.2  11.8 - 14.7 sec Final    PT normal range is not established for pediatrics.    INR 10/05/2022 1.1   Final    aPTT 10/05/2022 29.0  24.6 - 36.7 sec Final    PTT normal range is not established for pediatrics.         Imaging: Venous US LE 10/05/22    Right thigh veins: The common femoral, femoral, popliteal, upper greater saphenous, and deep femoral veins are patent and free of thrombus. The veins are normally compressible and have normal phasic flow and augmentation response.     Right calf veins: The visualized calf veins are patent.     Left thigh veins: There is residual occlusive thrombus in the left common femoral vein.     Left calf veins: The visualized calf veins are patent.     Miscellaneous: None    CTA Abdomen 10/05/22    Liver appears normal. Spleen appears normal.  Pancreas appears normal. The adrenals are not enlarged. There is a right renal cyst. Aorta shows no evidence of an aneurysm. There is a left-sided ostomy with a periosteal hernia.     There is good flow present in the celiac artery and SMA.  The MIRA is not identified.  There is noncalcified plaque in the infrarenal abdominal aorta.  There is occlusion of the internal iliac artery on the right in the external iliac artery on the left.  There is reconstitution of the common femoral  artery on the left.       Assessment:       1. Chronic deep vein thrombosis (DVT) of femoral vein of left lower extremity    2. Metastasis from cervical cancer    3. Hypertension, unspecified type    4. PVD (peripheral vascular disease)           Plan:     Chronic DVT - Pt with chronic DVT in the left common femoral vein  -Given active malignancy, the patient needs lifelong anticoagulation  -Will monitor CBC and CMP every 6 months    Cervical Cancer - Pt to be scheduled back to see Dr Mensah    HTN - PT on coreg and spironolactone  -BP stable  -Will monitor    PVD - Pt being followed by Dr Koobehi at [facility]  -Pt with recent stent placed in RLE  -PT on Eliquis and palvix  -Management per vascular surgery    Route Chart for Scheduling    Med Onc Chart Routing      Follow up with physician 6 months. The patient needs an urgetn appt with Dr Mensah for follow up for her cervical cancer.  She needs a CBC and CMP in 6 months with a return appt at that time.   Follow up with ADDY    Infusion scheduling note    Injection scheduling note    Labs    Imaging    Pharmacy appointment    Other referrals          Treatment Plan Information   OP ADVANCED/RECURRENT CERVICAL CARCINOMA PEMBROLIZUMAB 200 MG Q3W   Opal Mensah MD   Upcoming Treatment Dates - OP ADVANCED/RECURRENT CERVICAL CARCINOMA PEMBROLIZUMAB 200 MG Q3W    10/14/2020       Chemotherapy       pembrolizumab (KEYTRUDA) 200 mg in sodium chloride 0.9% 100 mL chemo infusion    Therapy Plan Information  PORT FLUSH  Flushes  heparin, porcine (PF) 100 unit/mL injection flush 500 Units  500 Units, Intravenous, Every visit  sodium chloride 0.9% flush 10 mL  10 mL, Intravenous, Every visit    INF FLUIDS  Flushes  sodium chloride 0.9% flush 10 mL  10 mL, Intravenous, PRN  heparin, porcine (PF) 100 unit/mL injection flush 500 Units  500 Units, Intravenous, PRN  PRN Medications  ondansetron injection 8 mg  8 mg, Intravenous, Every visit      Danny  MD Shanna  [facility]  Hematology and Oncology  [facility]   [address] [address]   O: [phone]  F: [phone]         WDL

## 2022-11-26 NOTE — PROGRESS NOTE BEHAVIORAL HEALTH - NSBHCHARTREVIEWVS_PSY_A_CORE FT
Scheduling transferring call for critical lab. Gave them the answering service number for the [facility] to have the on call provider paged       Rebecca Joaquin RN [facility] November 26, 2022 5:45 P  
Vital Signs Last 24 Hrs  T(C): 36.2 (13 Apr 2021 08:00), Max: 36.8 (12 Apr 2021 16:19)  T(F): 97.2 (13 Apr 2021 08:00), Max: 98.2 (12 Apr 2021 16:19)  HR: 83 (13 Apr 2021 08:00) (67 - 84)  BP: 106/57 (13 Apr 2021 08:00) (90/54 - 121/70)  BP(mean): 72 (12 Apr 2021 18:33) (70 - 75)  RR: 18 (13 Apr 2021 08:00) (14 - 18)  SpO2: 98% (12 Apr 2021 19:00) (95% - 98%)
Vital Signs Last 24 Hrs  T(C): 36.2 (15 Apr 2021 09:00), Max: 36.2 (15 Apr 2021 09:00)  T(F): 97.2 (15 Apr 2021 09:00), Max: 97.2 (15 Apr 2021 09:00)  HR: 71 (15 Apr 2021 09:00) (71 - 72)  BP: 116/68 (15 Apr 2021 09:00) (105/55 - 116/68)  BP(mean): --  RR: 18 (15 Apr 2021 09:00) (18 - 24)  SpO2: 95% (14 Apr 2021 16:39) (95% - 95%)
Vital Signs Last 24 Hrs  T(C): 36.3 (12 Apr 2021 07:53), Max: 36.6 (12 Apr 2021 00:00)  T(F): 97.4 (12 Apr 2021 07:53), Max: 97.8 (12 Apr 2021 00:00)  HR: 65 (12 Apr 2021 07:53) (65 - 79)  BP: 123/58 (12 Apr 2021 07:53) (104/58 - 123/58)  BP(mean): --  RR: 18 (12 Apr 2021 07:53) (18 - 19)  SpO2: --
Vital Signs Last 24 Hrs  T(C): 36.5 (11 Apr 2021 08:00), Max: 36.9 (10 Apr 2021 12:00)  T(F): 97.7 (11 Apr 2021 08:00), Max: 98.4 (10 Apr 2021 12:00)  HR: 73 (11 Apr 2021 08:00) (72 - 90)  BP: 114/56 (11 Apr 2021 08:00) (114/56 - 134/79)  BP(mean): 103 (10 Apr 2021 12:00) (79 - 103)  RR: 18 (11 Apr 2021 08:00) (11 - 34)  SpO2: 98% (10 Apr 2021 12:00) (98% - 98%)

## 2022-12-26 NOTE — PRE-OP CHECKLIST - 2.
no
Patient has loose bottom teeth. MD Mckeon, anesthesia MD Garcia and OR RN Shilpa Matos at bedside and aware.

## 2023-08-14 NOTE — ED ADULT NURSE NOTE - NS PRO PASSIVE SMOKE EXP
Last OV 07/12/2023      Last fill amphetamine 10 mg, amphetamine 15       Next OV none      Last Labs none       No

## 2024-04-19 ENCOUNTER — EMERGENCY (EMERGENCY)
Facility: HOSPITAL | Age: 64
LOS: 0 days | Discharge: HOME | End: 2024-04-19
Attending: EMERGENCY MEDICINE
Payer: MEDICAID

## 2024-04-19 VITALS
WEIGHT: 315 LBS | TEMPERATURE: 99 F | HEIGHT: 70 IN | HEART RATE: 92 BPM | OXYGEN SATURATION: 98 % | SYSTOLIC BLOOD PRESSURE: 149 MMHG | RESPIRATION RATE: 18 BRPM | DIASTOLIC BLOOD PRESSURE: 69 MMHG

## 2024-04-19 DIAGNOSIS — E78.5 HYPERLIPIDEMIA, UNSPECIFIED: ICD-10-CM

## 2024-04-19 DIAGNOSIS — R07.81 PLEURODYNIA: ICD-10-CM

## 2024-04-19 DIAGNOSIS — Z79.4 LONG TERM (CURRENT) USE OF INSULIN: ICD-10-CM

## 2024-04-19 DIAGNOSIS — R05.1 ACUTE COUGH: ICD-10-CM

## 2024-04-19 DIAGNOSIS — I10 ESSENTIAL (PRIMARY) HYPERTENSION: ICD-10-CM

## 2024-04-19 DIAGNOSIS — E11.9 TYPE 2 DIABETES MELLITUS WITHOUT COMPLICATIONS: ICD-10-CM

## 2024-04-19 PROCEDURE — 71046 X-RAY EXAM CHEST 2 VIEWS: CPT

## 2024-04-19 PROCEDURE — 99283 EMERGENCY DEPT VISIT LOW MDM: CPT | Mod: 25

## 2024-04-19 PROCEDURE — 71046 X-RAY EXAM CHEST 2 VIEWS: CPT | Mod: 26

## 2024-04-19 PROCEDURE — 99284 EMERGENCY DEPT VISIT MOD MDM: CPT

## 2024-04-19 PROCEDURE — 93010 ELECTROCARDIOGRAM REPORT: CPT

## 2024-04-19 PROCEDURE — 93005 ELECTROCARDIOGRAM TRACING: CPT

## 2024-04-19 RX ORDER — AZITHROMYCIN 500 MG/1
1 TABLET, FILM COATED ORAL
Qty: 1 | Refills: 0
Start: 2024-04-19 | End: 2024-04-23

## 2024-04-19 NOTE — ED PROVIDER NOTE - CLINICAL SUMMARY MEDICAL DECISION MAKING FREE TEXT BOX
63-year-old male presents to the ED for cough.  In the emergency department had imaging, given his social situation and comorbid diabetes, will treat with antibiotics for bronchitis, chest x-ray reviewed by me with questionable opacities but read officially negative.  No fever or chills no productive cough.  Will discharge with return instructions.

## 2024-04-19 NOTE — ED PROVIDER NOTE - PATIENT PORTAL LINK FT
You can access the FollowMyHealth Patient Portal offered by [facility] by registering at the following website: [email or web address]. By joining [employer]’s FollowMyHealth portal, you will also be able to view your health information using other applications (apps) compatible with our system.

## 2024-04-19 NOTE — ED PROVIDER NOTE - ATTENDING CONTRIBUTION TO CARE
I personally evaluated the patient. I reviewed the Resident´s or Physician Assistant´s note (as assigned above), and agree with the findings and plan except as documented in my note.     63-year-old male presents to the ED for evaluation of cough.  Lives in [facility].  Admits to pain on his right side of his chest with coughing.  No fever or chills.  Cough is nonproductive.  No other symptoms.  Denies chest pain, shortness of breath, fever, chills no known sick contacts.    Sent to the ED by his PMD for chest x-ray.    Comorbid disease burden includes diabetes    GENERAL: male in no distress.   CHEST: normal work of breathing noted no accessory muscle use  CV: pulses intact   EXTR: FROM   NEURO: AAO 3 gait memory coordination grossly intact  SKIN: normal no pallor     Impression: Cough    Plan: Imaging, reevaluation

## 2024-04-19 NOTE — ED PROVIDER NOTE - PHYSICAL EXAMINATION
VITAL SIGNS: I have reviewed nursing notes and confirm.  CONSTITUTIONAL: well-appearing, non-toxic, NAD  SKIN: Warm dry, normal skin turgor, no acute rash, no bruising  HEAD: NCAT  EYES: EOMI, PERRLA, no scleral icterus, normal conjunctiva  ENT: Moist mucous membranes, OR clear. Normal pharynx with no erythema, exudates, or tonsillar hypertrophy.   NECK: Supple; non tender. Full ROM. No cervical LAD  CARD: RRR, no murmurs, rubs or gallops  RESP: clear to ausculation b/l.  No rales, rhonchi, or wheezing. Nonproductive cough. Reproducible right lower rib tenderness. No increased WOB.   ABD: soft, + BS, non-tender, non-distended, no rebound or guarding. No CVA tenderness  EXT: Full ROM, no bony tenderness, no pedal edema, no calf tenderness  NEURO: normal motor. normal sensory. CN II-XII intact. Cerebellar testing normal. Normal gait.  PSYCH: Cooperative, appropriate.

## 2024-04-19 NOTE — ED ADULT NURSE NOTE - HOW OFTEN DO YOU HAVE A DRINK CONTAINING ALCOHOL?
Please read and follow discharge instructions and return precautions.  Rest, avoid any strenuous activity, over exertion or overheating.  Keep well hydrated.  Return immediately if you develop new or worsening symptoms or if you have new problems or concerns.  You will be sore--alternate Tylenol or ibuprofen over-the-counter as directed if needed for pain.  Important to follow up closely outpatient with your primary care physician in the next 3 days.    
Never

## 2024-04-19 NOTE — ED ADULT NURSE NOTE - NSFALLUNIVINTERV_ED_ALL_ED
Bed/Stretcher in lowest position, wheels locked, appropriate side rails in place/Call bell, personal items and telephone in reach/Instruct patient to call for assistance before getting out of bed/chair/stretcher/Non-slip footwear applied when patient is off stretcher/[place] to call system/Physically safe environment - no spills, clutter or unnecessary equipment/Purposeful proactive rounding/Room/bathroom lighting operational, light cord in reach

## 2024-04-19 NOTE — ED PROVIDER NOTE - CARE PROVIDER_API CALL
Rivera Alvarado .  Internal Medicine  [address] [place] [address]  Phone: [phone]  Fax: [phone]  Follow Up Time: Routine

## 2024-04-19 NOTE — ED PROVIDER NOTE - OBJECTIVE STATEMENT
63-year-old male with past medical history of diabetes, hypertension, dyslipidemia who presents for cough and right-sided rib pain.  Reports cough and sneezing for the past 2 days, now associated right lower rib pain.  Seen by PMD who recommended he go to the ER for chest x-ray.  Denies fevers, chills, chest pain, shortness of breath, sore throat, ear pain, headache, vision changes, nausea, vomiting, abdominal pain, weakness, numbness, recent travel, leg swelling.  Denies tobacco use, substance use.

## 2024-11-15 NOTE — ED ADULT TRIAGE NOTE - SPO2 (%)
I discussed the case with the resident. The chart was reviewed. I agree with the assessment and plan. Care provided was reasonable and necessary.    98

## 2025-06-25 NOTE — ED ADULT NURSE NOTE - NEURO WDL
Detail Level: Generalized Detail Level: Zone Detail Level: Detailed Alert and oriented to person, place and time, memory intact, behavior appropriate to situation, PERRL.